# Patient Record
Sex: MALE | Race: WHITE | NOT HISPANIC OR LATINO | Employment: OTHER | URBAN - METROPOLITAN AREA
[De-identification: names, ages, dates, MRNs, and addresses within clinical notes are randomized per-mention and may not be internally consistent; named-entity substitution may affect disease eponyms.]

---

## 2019-11-16 ENCOUNTER — HOSPITAL ENCOUNTER (EMERGENCY)
Facility: HOSPITAL | Age: 59
Discharge: HOME | End: 2019-11-16
Attending: EMERGENCY MEDICINE

## 2019-11-16 VITALS
DIASTOLIC BLOOD PRESSURE: 96 MMHG | TEMPERATURE: 98.2 F | BODY MASS INDEX: 36.29 KG/M2 | HEIGHT: 69 IN | WEIGHT: 245 LBS | OXYGEN SATURATION: 99 % | HEART RATE: 81 BPM | SYSTOLIC BLOOD PRESSURE: 161 MMHG | RESPIRATION RATE: 19 BRPM

## 2019-11-16 DIAGNOSIS — J22 BACTERIAL LOWER RESPIRATORY INFECTION: Primary | ICD-10-CM

## 2019-11-16 DIAGNOSIS — B96.89 BACTERIAL LOWER RESPIRATORY INFECTION: Primary | ICD-10-CM

## 2019-11-16 PROCEDURE — 99283 EMERGENCY DEPT VISIT LOW MDM: CPT

## 2019-11-16 PROCEDURE — 99283 EMERGENCY DEPT VISIT LOW MDM: CPT | Performed by: PHYSICIAN ASSISTANT

## 2019-11-16 RX ORDER — AMOXICILLIN AND CLAVULANATE POTASSIUM 875; 125 MG/1; MG/1
1 TABLET, FILM COATED ORAL EVERY 12 HOURS
Qty: 14 TABLET | Refills: 0 | Status: SHIPPED | OUTPATIENT
Start: 2019-11-16 | End: 2019-11-23

## 2019-11-16 NOTE — ED PROVIDER NOTES
History  Chief Complaint   Patient presents with    Cough     Pt c/o cough for the past 2 wks  Romel Grove is a 60 y o  male w PMH none significant who presents for evaluation of cough  Pt w cough for 2 weeks  Multiple sick family members  No f/c  + productive phlegm, no hemoptysis, cp or tightness  He took a few amoxicillin his dog had been prescribed and felt better but only had a few pills and now feels worse now that he has stopped them  None       History reviewed  No pertinent past medical history  History reviewed  No pertinent surgical history  History reviewed  No pertinent family history  I have reviewed and agree with the history as documented  Social History     Tobacco Use    Smoking status: Never Smoker    Smokeless tobacco: Never Used   Substance Use Topics    Alcohol use: Never     Frequency: Never    Drug use: Never        Review of Systems   Constitutional: Negative for activity change, chills, diaphoresis, fatigue and fever  HENT: Negative for congestion and rhinorrhea  Eyes: Negative for pain  Respiratory: Positive for cough  Negative for chest tightness, shortness of breath and wheezing  Cardiovascular: Negative for chest pain and palpitations  Gastrointestinal: Negative for abdominal distention, constipation, diarrhea, nausea and vomiting  Genitourinary: Negative for difficulty urinating and dysuria  Musculoskeletal: Negative for arthralgias and myalgias  Neurological: Negative for dizziness, weakness, light-headedness and headaches  Psychiatric/Behavioral: The patient is not nervous/anxious  Physical Exam  Physical Exam   Constitutional: He is oriented to person, place, and time  He appears well-developed and well-nourished  No distress  HENT:   Head: Normocephalic and atraumatic  Eyes: Pupils are equal, round, and reactive to light  Neck: Neck supple  No tracheal deviation present     Cardiovascular: Normal rate, regular rhythm and intact distal pulses  Exam reveals no gallop and no friction rub  No murmur heard  Pulmonary/Chest: Effort normal and breath sounds normal  No respiratory distress  He has no wheezes  He has no rales  Abdominal: Soft  Bowel sounds are normal  He exhibits no distension and no mass  There is no tenderness  There is no guarding  Musculoskeletal: He exhibits no edema or deformity  Neurological: He is alert and oriented to person, place, and time  Skin: Skin is warm and dry  He is not diaphoretic  Psychiatric: He has a normal mood and affect  His behavior is normal    Nursing note and vitals reviewed  Vital Signs  ED Triage Vitals   Temperature Pulse Respirations Blood Pressure SpO2   11/16/19 1004 11/16/19 1004 11/16/19 1004 11/16/19 1004 11/16/19 1004   98 2 °F (36 8 °C) 81 19 161/96 99 %      Temp Source Heart Rate Source Patient Position - Orthostatic VS BP Location FiO2 (%)   11/16/19 1004 11/16/19 1004 11/16/19 1004 11/16/19 1004 --   Oral Monitor Sitting Right arm       Pain Score       11/16/19 1002       No Pain           Vitals:    11/16/19 1004   BP: 161/96   Pulse: 81   Patient Position - Orthostatic VS: Sitting         Visual Acuity      ED Medications  Medications - No data to display    Diagnostic Studies  Results Reviewed     None                 No orders to display              Procedures  Procedures       ED Course                               MDM  Number of Diagnoses or Management Options  Bacterial lower respiratory infection:   Diagnosis management comments: DDX includes but not ltd to:   Concern for bronchitis v pna  Considering he did improve on amoxicillin and worse after it I am concerned for a bacterial infection  Offered CXR however patient is requesting an abx and did feel improve on it  He does not want a CXR and I do not feel it would    Patient will bc dc on augmentin, can follow up w his PCP     Return parameters discussed   Pt requires f/u as an outpt  Pt expresses understanding w above treatment plan  All questions answered prior to d/c  Portions of the record may have been created with voice recognition software   Occasional wrong word or "sound a like" substitutions may have occurred due to the inherent limitations of voice recognition software   Read the chart carefully and recognize, using context, where substitutions have occurred  Disposition  Final diagnoses:   Bacterial lower respiratory infection     Time reflects when diagnosis was documented in both MDM as applicable and the Disposition within this note     Time User Action Codes Description Comment    11/16/2019 10:43 AM Marquis Brooks [J98 8] Bacterial lower respiratory infection       ED Disposition     ED Disposition Condition Date/Time Comment    Discharge Stable Sat Nov 16, 2019 10:43 AM Nanda Lunch discharge to home/self care  Follow-up Information     Follow up With Specialties Details Why Contact Info Additional Information    [facility] Emergency Department Emergency Medicine  If symptoms worsen [address] [place] [id number] [phone] [place] ED, [address] [place] [address], [facility]          Patient's Medications   Discharge Prescriptions    AMOXICILLIN-CLAVULANATE (AUGMENTIN) 875-125 MG PER TABLET    Take 1 tablet by mouth every 12 (twelve) hours for 7 days       Start Date: 11/16/2019End Date: 11/23/2019       Order Dose: 1 tablet       Quantity: 14 tablet    Refills: 0     No discharge procedures on file      ED Provider  Electronically Signed by           Stephy Steele PA-C  11/16/19 3441

## 2021-04-26 ENCOUNTER — APPOINTMENT (EMERGENCY)
Dept: RADIOLOGY | Facility: HOSPITAL | Age: 61
DRG: 137 | End: 2021-04-26
Payer: COMMERCIAL

## 2021-04-26 ENCOUNTER — HOSPITAL ENCOUNTER (INPATIENT)
Facility: HOSPITAL | Age: 61
LOS: 4 days | Discharge: HOME | DRG: 137 | End: 2021-05-01
Attending: EMERGENCY MEDICINE | Admitting: INTERNAL MEDICINE
Payer: COMMERCIAL

## 2021-04-26 DIAGNOSIS — E11.29 TYPE 2 DIABETES MELLITUS WITH OTHER DIABETIC KIDNEY COMPLICATION, WITH LONG-TERM CURRENT USE OF INSULIN (HCC): ICD-10-CM

## 2021-04-26 DIAGNOSIS — U07.1 PNEUMONIA DUE TO COVID-19 VIRUS: ICD-10-CM

## 2021-04-26 DIAGNOSIS — R79.89 ELEVATED SERUM CREATININE: ICD-10-CM

## 2021-04-26 DIAGNOSIS — J12.82 PNEUMONIA DUE TO COVID-19 VIRUS: ICD-10-CM

## 2021-04-26 DIAGNOSIS — R07.9 CHEST PAIN: Primary | ICD-10-CM

## 2021-04-26 DIAGNOSIS — I10 ESSENTIAL HYPERTENSION: ICD-10-CM

## 2021-04-26 DIAGNOSIS — Z79.4 TYPE 2 DIABETES MELLITUS WITH OTHER DIABETIC KIDNEY COMPLICATION, WITH LONG-TERM CURRENT USE OF INSULIN (HCC): ICD-10-CM

## 2021-04-26 DIAGNOSIS — U07.1 COVID-19 VIRUS INFECTION: ICD-10-CM

## 2021-04-26 LAB
ANION GAP SERPL CALCULATED.3IONS-SCNC: 11 MMOL/L (ref 4–13)
ATRIAL RATE: 79 BPM
ATRIAL RATE: 83 BPM
BASOPHILS # BLD AUTO: 0.01 THOUSANDS/ΜL (ref 0–0.1)
BASOPHILS NFR BLD AUTO: 0 % (ref 0–1)
BUN SERPL-MCNC: 19 MG/DL (ref 5–25)
CALCIUM SERPL-MCNC: 7.7 MG/DL (ref 8.3–10.1)
CHLORIDE SERPL-SCNC: 108 MMOL/L (ref 100–108)
CO2 SERPL-SCNC: 21 MMOL/L (ref 21–32)
CREAT SERPL-MCNC: 1.77 MG/DL (ref 0.6–1.3)
EOSINOPHIL # BLD AUTO: 0.01 THOUSAND/ΜL (ref 0–0.61)
EOSINOPHIL NFR BLD AUTO: 0 % (ref 0–6)
ERYTHROCYTE [DISTWIDTH] IN BLOOD BY AUTOMATED COUNT: 12.7 % (ref 11.6–15.1)
GFR SERPL CREATININE-BSD FRML MDRD: 41 ML/MIN/1.73SQ M
GLUCOSE SERPL-MCNC: 129 MG/DL (ref 65–140)
GLUCOSE SERPL-MCNC: 137 MG/DL (ref 65–140)
GLUCOSE SERPL-MCNC: 99 MG/DL (ref 65–140)
HCT VFR BLD AUTO: 41.5 % (ref 36.5–49.3)
HGB BLD-MCNC: 14.2 G/DL (ref 12–17)
IMM GRANULOCYTES # BLD AUTO: 0.02 THOUSAND/UL (ref 0–0.2)
IMM GRANULOCYTES NFR BLD AUTO: 0 % (ref 0–2)
LYMPHOCYTES # BLD AUTO: 0.93 THOUSANDS/ΜL (ref 0.6–4.47)
LYMPHOCYTES NFR BLD AUTO: 15 % (ref 14–44)
MCH RBC QN AUTO: 28.6 PG (ref 26.8–34.3)
MCHC RBC AUTO-ENTMCNC: 34.2 G/DL (ref 31.4–37.4)
MCV RBC AUTO: 84 FL (ref 82–98)
MONOCYTES # BLD AUTO: 0.44 THOUSAND/ΜL (ref 0.17–1.22)
MONOCYTES NFR BLD AUTO: 7 % (ref 4–12)
NEUTROPHILS # BLD AUTO: 4.67 THOUSANDS/ΜL (ref 1.85–7.62)
NEUTS SEG NFR BLD AUTO: 78 % (ref 43–75)
NRBC BLD AUTO-RTO: 0 /100 WBCS
NT-PROBNP SERPL-MCNC: 521 PG/ML
P AXIS: 63 DEGREES
P AXIS: 80 DEGREES
PLATELET # BLD AUTO: 162 THOUSANDS/UL (ref 149–390)
PLATELET # BLD AUTO: 172 THOUSANDS/UL (ref 149–390)
PMV BLD AUTO: 10 FL (ref 8.9–12.7)
PMV BLD AUTO: 9.9 FL (ref 8.9–12.7)
POTASSIUM SERPL-SCNC: 3.8 MMOL/L (ref 3.5–5.3)
PR INTERVAL: 152 MS
PR INTERVAL: 154 MS
QRS AXIS: 49 DEGREES
QRS AXIS: 77 DEGREES
QRSD INTERVAL: 90 MS
QRSD INTERVAL: 96 MS
QT INTERVAL: 372 MS
QT INTERVAL: 380 MS
QTC INTERVAL: 435 MS
QTC INTERVAL: 437 MS
RBC # BLD AUTO: 4.96 MILLION/UL (ref 3.88–5.62)
SARS-COV-2 RNA RESP QL NAA+PROBE: POSITIVE
SODIUM SERPL-SCNC: 140 MMOL/L (ref 136–145)
T WAVE AXIS: -19 DEGREES
T WAVE AXIS: -30 DEGREES
TROPONIN I SERPL-MCNC: 0.04 NG/ML
TROPONIN I SERPL-MCNC: 0.05 NG/ML
TROPONIN I SERPL-MCNC: 0.06 NG/ML
TROPONIN I SERPL-MCNC: 0.07 NG/ML
VENTRICULAR RATE: 79 BPM
VENTRICULAR RATE: 83 BPM
WBC # BLD AUTO: 6.08 THOUSAND/UL (ref 4.31–10.16)

## 2021-04-26 PROCEDURE — 36415 COLL VENOUS BLD VENIPUNCTURE: CPT | Performed by: NURSE PRACTITIONER

## 2021-04-26 PROCEDURE — 85025 COMPLETE CBC W/AUTO DIFF WBC: CPT | Performed by: NURSE PRACTITIONER

## 2021-04-26 PROCEDURE — 84484 ASSAY OF TROPONIN QUANT: CPT | Performed by: INTERNAL MEDICINE

## 2021-04-26 PROCEDURE — 80048 BASIC METABOLIC PNL TOTAL CA: CPT | Performed by: NURSE PRACTITIONER

## 2021-04-26 PROCEDURE — 93005 ELECTROCARDIOGRAM TRACING: CPT

## 2021-04-26 PROCEDURE — 85049 AUTOMATED PLATELET COUNT: CPT | Performed by: INTERNAL MEDICINE

## 2021-04-26 PROCEDURE — 82948 REAGENT STRIP/BLOOD GLUCOSE: CPT

## 2021-04-26 PROCEDURE — 99285 EMERGENCY DEPT VISIT HI MDM: CPT | Performed by: NURSE PRACTITIONER

## 2021-04-26 PROCEDURE — U0005 INFEC AGEN DETEC AMPLI PROBE: HCPCS | Performed by: NURSE PRACTITIONER

## 2021-04-26 PROCEDURE — 99220 PR INITIAL OBSERVATION CARE/DAY 70 MINUTES: CPT | Performed by: INTERNAL MEDICINE

## 2021-04-26 PROCEDURE — 84484 ASSAY OF TROPONIN QUANT: CPT | Performed by: NURSE PRACTITIONER

## 2021-04-26 PROCEDURE — 99285 EMERGENCY DEPT VISIT HI MDM: CPT

## 2021-04-26 PROCEDURE — 83880 ASSAY OF NATRIURETIC PEPTIDE: CPT | Performed by: NURSE PRACTITIONER

## 2021-04-26 PROCEDURE — 93010 ELECTROCARDIOGRAM REPORT: CPT | Performed by: INTERNAL MEDICINE

## 2021-04-26 PROCEDURE — U0003 INFECTIOUS AGENT DETECTION BY NUCLEIC ACID (DNA OR RNA); SEVERE ACUTE RESPIRATORY SYNDROME CORONAVIRUS 2 (SARS-COV-2) (CORONAVIRUS DISEASE [COVID-19]), AMPLIFIED PROBE TECHNIQUE, MAKING USE OF HIGH THROUGHPUT TECHNOLOGIES AS DESCRIBED BY CMS-2020-01-R: HCPCS | Performed by: NURSE PRACTITIONER

## 2021-04-26 PROCEDURE — 71045 X-RAY EXAM CHEST 1 VIEW: CPT

## 2021-04-26 RX ORDER — SODIUM CHLORIDE 9 MG/ML
50 INJECTION, SOLUTION INTRAVENOUS CONTINUOUS
Status: DISCONTINUED | OUTPATIENT
Start: 2021-04-26 | End: 2021-04-29

## 2021-04-26 RX ORDER — HYDRALAZINE HYDROCHLORIDE 20 MG/ML
5 INJECTION INTRAMUSCULAR; INTRAVENOUS EVERY 6 HOURS PRN
Status: DISCONTINUED | OUTPATIENT
Start: 2021-04-26 | End: 2021-05-01 | Stop reason: HOSPADM

## 2021-04-26 RX ORDER — SODIUM CHLORIDE 9 MG/ML
3 INJECTION INTRAVENOUS
Status: DISCONTINUED | OUTPATIENT
Start: 2021-04-26 | End: 2021-05-01 | Stop reason: HOSPADM

## 2021-04-26 RX ORDER — AMLODIPINE BESYLATE 5 MG/1
5 TABLET ORAL DAILY
Status: DISCONTINUED | OUTPATIENT
Start: 2021-04-26 | End: 2021-05-01 | Stop reason: HOSPADM

## 2021-04-26 RX ORDER — LISINOPRIL 20 MG/1
20 TABLET ORAL DAILY
COMMUNITY
End: 2021-05-01 | Stop reason: HOSPADM

## 2021-04-26 RX ORDER — ACETAMINOPHEN 325 MG/1
650 TABLET ORAL EVERY 6 HOURS PRN
Status: DISCONTINUED | OUTPATIENT
Start: 2021-04-26 | End: 2021-05-01 | Stop reason: HOSPADM

## 2021-04-26 RX ORDER — HEPARIN SODIUM 5000 [USP'U]/ML
5000 INJECTION, SOLUTION INTRAVENOUS; SUBCUTANEOUS EVERY 8 HOURS SCHEDULED
Status: DISCONTINUED | OUTPATIENT
Start: 2021-04-26 | End: 2021-05-01 | Stop reason: HOSPADM

## 2021-04-26 RX ORDER — ASPIRIN 81 MG/1
324 TABLET, CHEWABLE ORAL ONCE
Status: COMPLETED | OUTPATIENT
Start: 2021-04-26 | End: 2021-04-26

## 2021-04-26 RX ORDER — NITROGLYCERIN 0.4 MG/1
0.4 TABLET SUBLINGUAL
Status: DISCONTINUED | OUTPATIENT
Start: 2021-04-26 | End: 2021-05-01 | Stop reason: HOSPADM

## 2021-04-26 RX ADMIN — AMLODIPINE BESYLATE 5 MG: 5 TABLET ORAL at 16:40

## 2021-04-26 RX ADMIN — ACETAMINOPHEN 650 MG: 325 TABLET, FILM COATED ORAL at 22:39

## 2021-04-26 RX ADMIN — ASPIRIN 324 MG: 81 TABLET, CHEWABLE ORAL at 13:16

## 2021-04-26 RX ADMIN — SODIUM CHLORIDE 50 ML/HR: 0.9 INJECTION, SOLUTION INTRAVENOUS at 16:39

## 2021-04-26 RX ADMIN — HEPARIN SODIUM 5000 UNITS: 5000 INJECTION INTRAVENOUS; SUBCUTANEOUS at 22:39

## 2021-04-26 RX ADMIN — HEPARIN SODIUM 5000 UNITS: 5000 INJECTION INTRAVENOUS; SUBCUTANEOUS at 16:40

## 2021-04-26 NOTE — ED PROVIDER NOTES
History  Chief Complaint   Patient presents with    Chest Pain     pt reports L sided chest pain x 3 days with severe SOB, pt reports SOB is worse with walking      69-year-old male patient presents here with reports of chest pressure and heaviness  He reports for the last few days he has been having increased chest pressure with exertion  Now he is beginning to have this at rest   He has a history of hypertension diabetes and reports with his family  Chest Pain  Pain location:  Substernal area  Pain quality: tightness    Pain radiates to:  R arm  Pain severity:  Mild  Onset quality:  Gradual  Timing:  Intermittent  Progression:  Waxing and waning  Chronicity:  New  Context: movement    Relieved by:  None tried  Worsened by:  Exertion  Ineffective treatments:  None tried  Associated symptoms: diaphoresis, dizziness and fever    Associated symptoms: no abdominal pain, no back pain, no cough, no fatigue, no headache, no palpitations, no shortness of breath and not vomiting        Prior to Admission Medications   Prescriptions Last Dose Informant Patient Reported? Taking? Insulin Degludec (TRESIBA FLEXTOUCH SC)   Yes Yes   Sig: Inject 120 mg under the skin daily   lisinopril (ZESTRIL) 20 mg tablet   Yes No   Sig: Take 20 mg by mouth daily      Facility-Administered Medications: None       Past Medical History:   Diagnosis Date    Diabetes mellitus ([facility] [address] )     Hypertension        History reviewed  No pertinent surgical history  History reviewed  No pertinent family history  I have reviewed and agree with the history as documented  E-Cigarette/Vaping     E-Cigarette/Vaping Substances     Social History     Tobacco Use    Smoking status: Never Smoker    Smokeless tobacco: Never Used   Substance Use Topics    Alcohol use: Yes     Frequency: Monthly or less     Drinks per session: 1 or 2    Drug use: Never       Review of Systems   Constitutional: Positive for diaphoresis and fever   Negative for fatigue  HENT: Negative for congestion, ear pain, nosebleeds and sore throat  Eyes: Negative for photophobia, pain, discharge and visual disturbance  Respiratory: Negative for cough, choking, chest tightness, shortness of breath and wheezing  Cardiovascular: Positive for chest pain  Negative for palpitations  Gastrointestinal: Negative for abdominal distention, abdominal pain, diarrhea and vomiting  Genitourinary: Negative for dysuria, flank pain and frequency  Musculoskeletal: Negative for back pain, gait problem and joint swelling  Skin: Negative for color change and rash  Neurological: Positive for dizziness  Negative for syncope and headaches  Psychiatric/Behavioral: Negative for behavioral problems and confusion  The patient is not nervous/anxious  All other systems reviewed and are negative  Physical Exam  Physical Exam  Vitals signs and nursing note reviewed  Constitutional:       General: He is not in acute distress  Appearance: He is well-developed  HENT:      Head: Normocephalic and atraumatic  Eyes:      General:         Right eye: No discharge  Left eye: No discharge  Conjunctiva/sclera: Conjunctivae normal    Neck:      Musculoskeletal: Normal range of motion and neck supple  Cardiovascular:      Rate and Rhythm: Normal rate  Pulmonary:      Effort: Pulmonary effort is normal  No respiratory distress  Abdominal:      General: There is no distension  Tenderness: There is no guarding  Musculoskeletal:         General: No deformity  Skin:     General: Skin is warm and dry  Neurological:      Mental Status: He is alert and oriented to person, place, and time        Coordination: Coordination normal          Vital Signs  ED Triage Vitals [04/26/21 1127]   Temperature Pulse Respirations Blood Pressure SpO2   98 °F (36 7 °C) 89 (!) 24 165/82 99 %      Temp Source Heart Rate Source Patient Position - Orthostatic VS BP Location FiO2 (%)   Oral Monitor Sitting Left arm --      Pain Score       --           Vitals:    04/26/21 1127 04/26/21 1215 04/26/21 1245   BP: 165/82 (!) 162/109 (!) 179/92   Pulse: 89 76 74   Patient Position - Orthostatic VS: Sitting           Visual Acuity      ED Medications  Medications   sodium chloride (PF) 0 9 % injection 3 mL (has no administration in time range)   sodium chloride 0 9 % infusion (has no administration in time range)   heparin (porcine) subcutaneous injection 5,000 Units (has no administration in time range)   nitroglycerin (NITROSTAT) SL tablet 0 4 mg (has no administration in time range)   acetaminophen (TYLENOL) tablet 650 mg (has no administration in time range)   amLODIPine (NORVASC) tablet 5 mg (has no administration in time range)   hydrALAZINE (APRESOLINE) injection 5 mg (has no administration in time range)   insulin lispro (HumaLOG) 100 units/mL subcutaneous injection 1-6 Units (has no administration in time range)   insulin lispro (HumaLOG) 100 units/mL subcutaneous injection 1-5 Units (has no administration in time range)   aspirin chewable tablet 324 mg (324 mg Oral Given 4/26/21 1316)       Diagnostic Studies  Results Reviewed     Procedure Component Value Units Date/Time    Platelet count [[id number]]     Lab Status: No result Specimen: Blood     Novel Coronavirus (Covid-19),PCR [facility] [[id number]]  (Abnormal) Collected: 04/26/21 1238    Lab Status: Final result Specimen: Nares from Nose Updated: 04/26/21 1347     SARS-CoV-2 Positive    Narrative: The specimen collection materials, transport medium, and/or testing methodology utilized in the production of these test results have been proven to be reliable in a limited validation with an abbreviated program under the Emergency Utilization Authorization provided by the FDA  Testing reported as "Presumptive positive" will be confirmed with secondary testing to ensure result accuracy    Clinical caution and judgement should be used with the interpretation of these results with consideration of the clinical impression and other laboratory testing  Testing reported as "Positive" or "Negative" has been proven to be accurate according to standard laboratory validation requirements  All testing is performed with control materials showing appropriate reactivity at standard intervals        Basic metabolic panel [[id number]]  (Abnormal) Collected: 04/26/21 1238    Lab Status: Final result Specimen: Blood from Arm, Right Updated: 04/26/21 1317     Sodium 140 mmol/L      Potassium 3 8 mmol/L      Chloride 108 mmol/L      CO2 21 mmol/L      ANION GAP 11 mmol/L      BUN 19 mg/dL      Creatinine 1 77 mg/dL      Glucose 137 mg/dL      Calcium 7 7 mg/dL      eGFR 41 ml/min/1 73sq m     Narrative:      [place] guidelines for Chronic Kidney Disease (CKD):     Stage 1 with normal or high GFR (GFR > 90 mL/min/1 73 square meters)    Stage 2 Mild CKD (GFR = 60-89 mL/min/1 73 square meters)    Stage 3A Moderate CKD (GFR = 45-59 mL/min/1 73 square meters)    Stage 3B Moderate CKD (GFR = 30-44 mL/min/1 73 square meters)    Stage 4 Severe CKD (GFR = 15-29 mL/min/1 73 square meters)    Stage 5 End Stage CKD (GFR <15 mL/min/1 73 square meters)  Note: GFR calculation is accurate only with a steady state creatinine    NT-BNP PRO [[id number]]  (Abnormal) Collected: 04/26/21 1238    Lab Status: Final result Specimen: Blood from Arm, Right Updated: 04/26/21 1317     NT-proBNP 521 pg/mL     Troponin I [[id number]]  (Normal) Collected: 04/26/21 1238    Lab Status: Final result Specimen: Blood from Arm, Right Updated: 04/26/21 1310     Troponin I 0 04 ng/mL     CBC and differential [[id number]]  (Abnormal) Collected: 04/26/21 1238    Lab Status: Final result Specimen: Blood from Arm, Right Updated: 04/26/21 1244     WBC 6 08 Thousand/uL      RBC 4 96 Million/uL      Hemoglobin 14 2 g/dL      Hematocrit 41 5 %      MCV 84 fL      MCH 28 6 pg      MCHC 34 2 g/dL      RDW 12 7 %      MPV 9 9 fL      Platelets 530 Thousands/uL      nRBC 0 /100 WBCs      Neutrophils Relative 78 %      Immat GRANS % 0 %      Lymphocytes Relative 15 %      Monocytes Relative 7 %      Eosinophils Relative 0 %      Basophils Relative 0 %      Neutrophils Absolute 4 67 Thousands/µL      Immature Grans Absolute 0 02 Thousand/uL      Lymphocytes Absolute 0 93 Thousands/µL      Monocytes Absolute 0 44 Thousand/µL      Eosinophils Absolute 0 01 Thousand/µL      Basophils Absolute 0 01 Thousands/µL                  X-ray chest 1 view portable   Final Result by Lj Osman MD (04/26 1312)      Patchy peripheral pulmonary infiltrates which may indicate Covid pneumonia                    Workstation performed: [id number]                    Procedures  ECG 12 Lead Documentation Only    Date/Time: 4/26/2021 1:57 PM  Performed by: ALINA Hoffman  Authorized by: ALINA Hoffman     Indications / Diagnosis:  Cp  ECG reviewed by me, the ED Provider: yes    Patient location:  ED  Previous ECG:     Previous ECG:  Unavailable  Interpretation:     Interpretation: normal    Rate:     ECG rate:  87    ECG rate assessment: normal    Rhythm:     Rhythm: sinus rhythm    Ectopy:     Ectopy: none    QRS:     QRS axis:  Normal  Conduction:     Conduction: normal    ST segments:     ST segments:  Normal  T waves:     T waves: inverted    Q waves:     Q waves:  II, III, aVF and V6             ED Course             HEART Risk Score      Most Recent Value   Heart Score Risk Calculator   History  2 Filed at: 04/26/2021 1333   ECG  1 Filed at: 04/26/2021 1333   Age  1 Filed at: 04/26/2021 1333   Risk Factors  1 Filed at: 04/26/2021 1333   Troponin  0 Filed at: 04/26/2021 1333   HEART Score  5 Filed at: 04/26/2021 1333                        EDY Risk Score      Most Recent Value   Age >= 65  0 Filed at: 04/26/2021 1356   Known CAD (stenosis >= 50%)  0 Filed at: 04/26/2021 1356   Recent (<=24 hrs) Service Angina  1 Filed at: 04/26/2021 1356   ST Deviation >= 0 5 mm  1 Filed at: 04/26/2021 1356   3+ CAD Risk Factors (FHx, HTN, HLP, DM, Smoker)  0 Filed at: 04/26/2021 1356   Aspirin Use Past 7 Days  1 Filed at: 04/26/2021 1356   Elevated Cardiac Markers  0 Filed at: 04/26/2021 1356   EDY Risk Score (Calculated)  3 Filed at: 04/26/2021 1356                  MDM  Number of Diagnoses or Management Options  Chest pain: new and requires workup  Pneumonia due to COVID-19 virus: new and requires workup     Amount and/or Complexity of Data Reviewed  Clinical lab tests: reviewed and ordered  Tests in the radiology section of CPT®: reviewed and ordered  Tests in the medicine section of CPT®: reviewed and ordered  Independent visualization of images, tracings, or specimens: yes    Patient Progress  Patient progress: stable      Disposition  Final diagnoses:   Chest pain   Pneumonia due to COVID-19 virus     Time reflects when diagnosis was documented in both MDM as applicable and the Disposition within this note     Time User Action Codes Description Comment    4/26/2021  1:56 PM Isaac Luna Add [R07 9] Chest pain     4/26/2021  1:56 PM Isaac Luna Add [U07 1,  J12 82] Pneumonia due to COVID-19 virus       ED Disposition     ED Disposition Condition Date/Time Comment    Admit Stable Mon Apr 26, 2021  1:56 PM Case was discussed with Frances and the patient's admission status was agreed to be Admission Status: observation status to the service of Dr Vivian Maradiaga          Follow-up Information    None         Patient's Medications   Discharge Prescriptions    No medications on file     No discharge procedures on file      PDMP Review     None          ED Provider  Electronically Signed by           ALINA Dunn  04/26/21 9489

## 2021-04-26 NOTE — ASSESSMENT & PLAN NOTE
Previous creatinine 2 years ago approximately 1 2  Presented creatinine 1 7 unclear if BELKIS versus secondary to uncontrolled diabetes and hypertension  Will provide gentle IVF and reassess tomorrow

## 2021-04-26 NOTE — H&P
[facility]  H&P- Katt Benedict 1960, 63 y o  male MRN: [id number]  Unit/Bed#: ED 09 Encounter: [id number]  Primary Care Provider: No primary care provider on file  Date and time admitted to hospital: 4/26/2021 12:07 PM    * Chest pain  Assessment & Plan  Presented with substernal chest pain  Does have risk factors including hypertension and type 2 diabetes  Initial EKG showed no signs of acute ischemia  Initial troponin negative  Will trend troponins  Check echo  Continuous tele  P r n  Nitro    Elevated serum creatinine  Assessment & Plan  Previous creatinine 2 years ago approximately 1 2  Presented creatinine 1 7 unclear if BELKIS versus secondary to uncontrolled diabetes and hypertension  Will provide gentle IVF and reassess tomorrow    Essential hypertension  Assessment & Plan  Hold lisinopril in setting of BELKIS  Will add amlodipine given elevated blood pressure  Labetalol p r n  Type 2 diabetes mellitus with kidney complication, with long-term current use of insulin (HCC)  Assessment & Plan    Lab Results   Component Value Date    HGBA1C 7 6 (H) 07/13/2019   Sliding scale insulin      VTE Prophylaxis: Heparin  / sequential compression device   Code Status: full code  POLST: There is no POLST form on file for this patient (pre-hospital)  Discussion with family: pt    Anticipated Length of Stay:  Patient will be admitted on an Emergency basis with an anticipated length of stay of  < 2 midnights  Justification for Hospital Stay:  Chest pain    Total Time for Visit, including Counseling / Coordination of Care: 60 minutes  Greater than 50% of this total time spent on direct patient counseling and coordination of care  Chief Complaint:   Chest pain    History of Present Illness:    Katt Benedict is a 63 y o  male with past medical history significant of type 2 diabetes, hypertension initially presented with substernal chest pain  Patient reports worse with exertion    Otherwise no alleviating or aggravating factors  Denies any shortness of breath, fevers, chills, abdominal pain, nausea, vomiting, diarrhea, constipation, dysuria or any other symptoms  Review of Systems:    Review of Systems   Constitutional: Negative for appetite change, chills, diaphoresis, fatigue, fever and unexpected weight change  HENT: Negative for congestion, rhinorrhea and sore throat  Eyes: Negative for photophobia and visual disturbance  Respiratory: Negative for cough, shortness of breath and wheezing  Cardiovascular: Positive for chest pain  Negative for palpitations and leg swelling  Gastrointestinal: Negative for abdominal pain, anal bleeding, blood in stool, constipation, diarrhea, nausea and vomiting  Genitourinary: Negative for decreased urine volume, difficulty urinating, dysuria, flank pain, frequency, hematuria and urgency  Musculoskeletal: Negative for arthralgias, back pain, joint swelling and myalgias  Skin: Negative for color change and rash  Neurological: Negative for dizziness, seizures, facial asymmetry, speech difficulty, numbness and headaches  Psychiatric/Behavioral: Negative for agitation, confusion and decreased concentration  The patient is not nervous/anxious  Past Medical and Surgical History:     Past Medical History:   Diagnosis Date    Diabetes mellitus ([facility] [address] )     Hypertension        History reviewed  No pertinent surgical history  Meds/Allergies:    Prior to Admission medications    Medication Sig Start Date End Date Taking? Authorizing Provider   Insulin Degludec (TRESIBA FLEXTOUCH SC) Inject 120 mg under the skin daily   Yes Historical Provider, MD   lisinopril (ZESTRIL) 20 mg tablet Take 20 mg by mouth daily    Historical Provider, MD     I have reviewed home medications with patient personally      Allergies: No Known Allergies    Social History:     Marital Status:      Patient Pre-hospital Living Situation: home  Patient Pre-hospital Level of Mobility: independent  Patient Pre-hospital Diet Restrictions: none  Substance Use History:   Social History     Substance and Sexual Activity   Alcohol Use Yes    Frequency: Monthly or less    Drinks per session: 1 or 2     Social History     Tobacco Use   Smoking Status Never Smoker   Smokeless Tobacco Never Used     Social History     Substance and Sexual Activity   Drug Use Never       Family History:    History reviewed  No pertinent family history  Physical Exam:     Vitals:   Blood Pressure: (!) 179/92 (04/26/21 1245)  Pulse: 74 (04/26/21 1245)  Temperature: 98 °F (36 7 °C) (04/26/21 1127)  Temp Source: Oral (04/26/21 1127)  Respirations: 21 (04/26/21 1245)  SpO2: 98 % (04/26/21 1245)    Physical Exam  Constitutional:       General: He is not in acute distress  Appearance: He is well-developed  He is not diaphoretic  HENT:      Head: Normocephalic and atraumatic  Nose: Nose normal       Mouth/Throat:      Pharynx: No oropharyngeal exudate  Eyes:      General: No scleral icterus  Conjunctiva/sclera: Conjunctivae normal    Neck:      Musculoskeletal: Normal range of motion and neck supple  Cardiovascular:      Rate and Rhythm: Normal rate and regular rhythm  Heart sounds: Normal heart sounds  No murmur  No friction rub  No gallop  Pulmonary:      Effort: Pulmonary effort is normal  No respiratory distress  Breath sounds: Normal breath sounds  No wheezing or rales  Chest:      Chest wall: No tenderness  Abdominal:      General: Bowel sounds are normal  There is no distension  Palpations: Abdomen is soft  Tenderness: There is no abdominal tenderness  There is no guarding  Musculoskeletal: Normal range of motion  General: No tenderness or deformity  Skin:     General: Skin is warm and dry  Findings: No erythema  Neurological:      Mental Status: He is alert  Mental status is at baseline  Additional Data:     Lab Results:  I have personally reviewed pertinent reports  Results from last 7 days   Lab Units 04/26/21  1238   WBC Thousand/uL 6 08   HEMOGLOBIN g/dL 14 2   HEMATOCRIT % 41 5   PLATELETS Thousands/uL 172   NEUTROS PCT % 78*   LYMPHS PCT % 15   MONOS PCT % 7   EOS PCT % 0     Results from last 7 days   Lab Units 04/26/21  1238   SODIUM mmol/L 140   POTASSIUM mmol/L 3 8   CHLORIDE mmol/L 108   CO2 mmol/L 21   BUN mg/dL 19   CREATININE mg/dL 1 77*   ANION GAP mmol/L 11   CALCIUM mg/dL 7 7*   GLUCOSE RANDOM mg/dL 137                       Imaging: I have personally reviewed pertinent reports  X-ray chest 1 view portable   Final Result by Iraj Burnett MD (04/26 1312)      Patchy peripheral pulmonary infiltrates which may indicate Covid pneumonia  Workstation performed: [id number]             EKG, Pathology, and Other Studies Reviewed on Admission:   · EKG: reviewed    Allscripts / Epic Records Reviewed: Yes     ** Please Note: This note has been constructed using a voice recognition system   **

## 2021-04-26 NOTE — ASSESSMENT & PLAN NOTE
Hold lisinopril in setting of BEKLIS  Will add amlodipine given elevated blood pressure  Labetalol p r n

## 2021-04-26 NOTE — ASSESSMENT & PLAN NOTE
Presented with substernal chest pain  Does have risk factors including hypertension and type 2 diabetes  Initial EKG showed T-wave inversions in inferior leads  Initial troponin negative  Will trend troponins  Check echo  Continuous tele  P r n   Nitro  Would likely benefit from stress test troponins remain negative

## 2021-04-27 PROBLEM — E66.09 CLASS 2 OBESITY DUE TO EXCESS CALORIES WITHOUT SERIOUS COMORBIDITY WITH BODY MASS INDEX (BMI) OF 36.0 TO 36.9 IN ADULT: Status: ACTIVE | Noted: 2021-04-27

## 2021-04-27 PROBLEM — U07.1 COVID-19 VIRUS INFECTION: Status: ACTIVE | Noted: 2021-04-27

## 2021-04-27 LAB
ANION GAP SERPL CALCULATED.3IONS-SCNC: 12 MMOL/L (ref 4–13)
BUN SERPL-MCNC: 21 MG/DL (ref 5–25)
CALCIUM SERPL-MCNC: 7.8 MG/DL (ref 8.3–10.1)
CHLORIDE SERPL-SCNC: 109 MMOL/L (ref 100–108)
CO2 SERPL-SCNC: 18 MMOL/L (ref 21–32)
CREAT SERPL-MCNC: 1.7 MG/DL (ref 0.6–1.3)
GFR SERPL CREATININE-BSD FRML MDRD: 43 ML/MIN/1.73SQ M
GLUCOSE SERPL-MCNC: 108 MG/DL (ref 65–140)
GLUCOSE SERPL-MCNC: 184 MG/DL (ref 65–140)
GLUCOSE SERPL-MCNC: 212 MG/DL (ref 65–140)
GLUCOSE SERPL-MCNC: 283 MG/DL (ref 65–140)
GLUCOSE SERPL-MCNC: 61 MG/DL (ref 65–140)
POTASSIUM SERPL-SCNC: 3.5 MMOL/L (ref 3.5–5.3)
SODIUM SERPL-SCNC: 139 MMOL/L (ref 136–145)
TROPONIN I SERPL-MCNC: 0.08 NG/ML
TROPONIN I SERPL-MCNC: 0.08 NG/ML
TROPONIN I SERPL-MCNC: 0.09 NG/ML

## 2021-04-27 PROCEDURE — 82948 REAGENT STRIP/BLOOD GLUCOSE: CPT

## 2021-04-27 PROCEDURE — XW033E5 INTRODUCTION OF REMDESIVIR ANTI-INFECTIVE INTO PERIPHERAL VEIN, PERCUTANEOUS APPROACH, NEW TECHNOLOGY GROUP 5: ICD-10-PCS | Performed by: INTERNAL MEDICINE

## 2021-04-27 PROCEDURE — 80048 BASIC METABOLIC PNL TOTAL CA: CPT | Performed by: INTERNAL MEDICINE

## 2021-04-27 PROCEDURE — 84484 ASSAY OF TROPONIN QUANT: CPT | Performed by: INTERNAL MEDICINE

## 2021-04-27 PROCEDURE — 99226 PR SBSQ OBSERVATION CARE/DAY 35 MINUTES: CPT | Performed by: PHYSICIAN ASSISTANT

## 2021-04-27 RX ORDER — ZINC SULFATE 50(220)MG
220 CAPSULE ORAL DAILY
Status: DISCONTINUED | OUTPATIENT
Start: 2021-04-27 | End: 2021-05-01 | Stop reason: HOSPADM

## 2021-04-27 RX ORDER — ASCORBIC ACID 500 MG
1000 TABLET ORAL EVERY 12 HOURS SCHEDULED
Status: DISCONTINUED | OUTPATIENT
Start: 2021-04-27 | End: 2021-05-01 | Stop reason: HOSPADM

## 2021-04-27 RX ORDER — MULTIVITAMIN/IRON/FOLIC ACID 18MG-0.4MG
1 TABLET ORAL DAILY
Status: DISCONTINUED | OUTPATIENT
Start: 2021-05-04 | End: 2021-05-01 | Stop reason: HOSPADM

## 2021-04-27 RX ORDER — MELATONIN
2000 DAILY
Status: DISCONTINUED | OUTPATIENT
Start: 2021-04-27 | End: 2021-05-01 | Stop reason: HOSPADM

## 2021-04-27 RX ADMIN — INSULIN LISPRO 4 UNITS: 100 INJECTION, SOLUTION INTRAVENOUS; SUBCUTANEOUS at 11:07

## 2021-04-27 RX ADMIN — ZINC SULFATE 220 MG (50 MG) CAPSULE 220 MG: CAPSULE at 09:50

## 2021-04-27 RX ADMIN — Medication 2000 UNITS: at 09:49

## 2021-04-27 RX ADMIN — OXYCODONE HYDROCHLORIDE AND ACETAMINOPHEN 1000 MG: 500 TABLET ORAL at 09:49

## 2021-04-27 RX ADMIN — HEPARIN SODIUM 5000 UNITS: 5000 INJECTION INTRAVENOUS; SUBCUTANEOUS at 05:23

## 2021-04-27 RX ADMIN — ACETAMINOPHEN 650 MG: 325 TABLET, FILM COATED ORAL at 21:10

## 2021-04-27 RX ADMIN — OXYCODONE HYDROCHLORIDE AND ACETAMINOPHEN 1000 MG: 500 TABLET ORAL at 21:11

## 2021-04-27 RX ADMIN — HEPARIN SODIUM 5000 UNITS: 5000 INJECTION INTRAVENOUS; SUBCUTANEOUS at 21:11

## 2021-04-27 RX ADMIN — INSULIN LISPRO 2 UNITS: 100 INJECTION, SOLUTION INTRAVENOUS; SUBCUTANEOUS at 21:11

## 2021-04-27 RX ADMIN — AMLODIPINE BESYLATE 5 MG: 5 TABLET ORAL at 09:50

## 2021-04-27 RX ADMIN — HEPARIN SODIUM 5000 UNITS: 5000 INJECTION INTRAVENOUS; SUBCUTANEOUS at 14:56

## 2021-04-27 RX ADMIN — REMDESIVIR 200 MG: 100 INJECTION, POWDER, LYOPHILIZED, FOR SOLUTION INTRAVENOUS at 10:59

## 2021-04-27 RX ADMIN — INSULIN LISPRO 1 UNITS: 100 INJECTION, SOLUTION INTRAVENOUS; SUBCUTANEOUS at 17:45

## 2021-04-27 RX ADMIN — SODIUM CHLORIDE 50 ML/HR: 0.9 INJECTION, SOLUTION INTRAVENOUS at 21:16

## 2021-04-27 NOTE — ASSESSMENT & PLAN NOTE
· Presented with substernal chest pain, does have risk factors including hypertension and type 2 diabetes  · Initial EKG showed T-wave inversions in inferior leads  · Troponin flatly elevated, suspect non-MI elevated secondary to COVID PNA   · Check limited echo  · Continuous tele without acute changes, repeat EKG today   · P r n   Nitro  · Would likely benefit from stress test outpatient once cleared from COVID infection  · Consider chest pain related to COVID infection

## 2021-04-27 NOTE — ASSESSMENT & PLAN NOTE
· Incidental finding on admission, CXR showing ground-glass opacities  · Mild COVID-19 treatment pathway  · COVID-19 vitamins  · Heparin t i d   · Check inflammatory markers, trend  · Hold on Remdesivir as it is unclear when patient contracted virus  · Patient is not hypoxic, no indication for steroids or O2 supplement  · Patient afebrile, hold on antibiotics and check procalcitonin  · Monitor oxygen saturation, supplement needed to maintain > 90%  · Encourage incentive spirometer, ambulation within the room, self prone  Results from last 7 days   Lab Units 04/26/21  1238   WBC Thousand/uL 6 08

## 2021-04-27 NOTE — PLAN OF CARE
Problem: PAIN - ADULT  Goal: Verbalizes/displays adequate comfort level or baseline comfort level  Description: Interventions:  - Encourage patient to monitor pain and request assistance  - Assess pain using appropriate pain scale  - Administer analgesics based on type and severity of pain and evaluate response  - Implement non-pharmacological measures as appropriate and evaluate response  - Consider cultural and social influences on pain and pain management  - Notify physician/advanced practitioner if interventions unsuccessful or patient reports new pain  Outcome: Progressing     Problem: INFECTION - ADULT  Goal: Absence or prevention of progression during hospitalization  Description: INTERVENTIONS:  - Assess and monitor for signs and symptoms of infection  - Monitor lab/diagnostic results  - Monitor all insertion sites, i e  indwelling lines, tubes, and drains  - Monitor endotracheal if appropriate and nasal secretions for changes in amount and color  - [place] appropriate cooling/warming therapies per order  - Administer medications as ordered  - Instruct and encourage patient and family to use good hand hygiene technique  - Identify and instruct in appropriate isolation precautions for identified infection/condition  Outcome: Progressing  Goal: Absence of fever/infection during neutropenic period  Description: INTERVENTIONS:  - Monitor WBC    Outcome: Progressing     Problem: SAFETY ADULT  Goal: Patient will remain free of falls  Description: INTERVENTIONS:  - Assess patient frequently for physical needs  -  Identify cognitive and physical deficits and behaviors that affect risk of falls    -  [place] fall precautions as indicated by assessment   - Educate patient/family on patient safety including physical limitations  - Instruct patient to call for assistance with activity based on assessment  - Modify environment to reduce risk of injury  - Consider OT/PT consult to assist with strengthening/mobility  Outcome: Progressing  Goal: Maintain or return to baseline ADL function  Description: INTERVENTIONS:  -  Assess patient's ability to carry out ADLs; assess patient's baseline for ADL function and identify physical deficits which impact ability to perform ADLs (bathing, care of mouth/teeth, toileting, grooming, dressing, etc )  - Assess/evaluate cause of self-care deficits   - Assess range of motion  - Assess patient's mobility; develop plan if impaired  - Assess patient's need for assistive devices and provide as appropriate  - Encourage maximum independence but intervene and supervise when necessary  - Involve family in performance of ADLs  - Assess for home care needs following discharge   - Consider OT consult to assist with ADL evaluation and planning for discharge  - Provide patient education as appropriate  Outcome: Progressing  Goal: Maintain or return mobility status to optimal level  Description: INTERVENTIONS:  - Assess patient's baseline mobility status (ambulation, transfers, stairs, etc )    - Identify cognitive and physical deficits and behaviors that affect mobility  - Identify mobility aids required to assist with transfers and/or ambulation (gait belt, sit-to-stand, lift, walker, cane, etc )  - [place] fall precautions as indicated by assessment  - Record patient progress and toleration of activity level on Mobility SBAR; progress patient to next Phase/Stage  - Instruct patient to call for assistance with activity based on assessment  - Consider rehabilitation consult to assist with strengthening/weightbearing, etc   Outcome: Progressing     Problem: DISCHARGE PLANNING  Goal: Discharge to home or other facility with appropriate resources  Description: INTERVENTIONS:  - Identify barriers to discharge w/patient and caregiver  - Arrange for needed discharge resources and transportation as appropriate  - Identify discharge learning needs (meds, wound care, etc )  - Arrange for interpretive services to assist at discharge as needed  - Refer to Case Management Department for coordinating discharge planning if the patient needs post-hospital services based on physician/advanced practitioner order or complex needs related to functional status, cognitive ability, or social support system  Outcome: Progressing     Problem: Knowledge Deficit  Goal: Patient/family/caregiver demonstrates understanding of disease process, treatment plan, medications, and discharge instructions  Description: Complete learning assessment and assess knowledge base  Interventions:  - Provide teaching at level of understanding  - Provide teaching via preferred learning methods  Outcome: Progressing     Problem: Potential for Falls  Goal: Patient will remain free of falls  Description: INTERVENTIONS:  - Assess patient frequently for physical needs  -  Identify cognitive and physical deficits and behaviors that affect risk of falls    -  [place] fall precautions as indicated by assessment   - Educate patient/family on patient safety including physical limitations  - Instruct patient to call for assistance with activity based on assessment  - Modify environment to reduce risk of injury  - Consider OT/PT consult to assist with strengthening/mobility  Outcome: Progressing     Problem: CARDIOVASCULAR - ADULT  Goal: Maintains optimal cardiac output and hemodynamic stability  Description: INTERVENTIONS:  - Monitor I/O, vital signs and rhythm  - Monitor for S/S and trends of decreased cardiac output  - Administer and titrate ordered vasoactive medications to optimize hemodynamic stability  - Assess quality of pulses, skin color and temperature  - Assess for signs of decreased coronary artery perfusion  - Instruct patient to report change in severity of symptoms  Outcome: Progressing  Goal: Absence of cardiac dysrhythmias or at baseline rhythm  Description: INTERVENTIONS:  - Continuous cardiac monitoring, vital signs, obtain 12 lead EKG if ordered  - Administer antiarrhythmic and heart rate control medications as ordered  - Monitor electrolytes and administer replacement therapy as ordered  Outcome: Progressing     Problem: RESPIRATORY - ADULT  Goal: Achieves optimal ventilation and oxygenation  Description: INTERVENTIONS:  - Assess for changes in respiratory status  - Assess for changes in mentation and behavior  - Position to facilitate oxygenation and minimize respiratory effort  - Oxygen administered by appropriate delivery if ordered  - Initiate smoking cessation education as indicated  - Encourage broncho-pulmonary hygiene including cough, deep breathe, Incentive Spirometry  - Assess the need for suctioning and aspirate as needed  - Assess and instruct to report SOB or any respiratory difficulty  - Respiratory Therapy support as indicated  Outcome: Progressing     Problem: METABOLIC, FLUID AND ELECTROLYTES - ADULT  Goal: Electrolytes maintained within normal limits  Description: INTERVENTIONS:  - Monitor labs and assess patient for signs and symptoms of electrolyte imbalances  - Administer electrolyte replacement as ordered  - Monitor response to electrolyte replacements, including repeat lab results as appropriate  - Instruct patient on fluid and nutrition as appropriate  Outcome: Progressing  Goal: Fluid balance maintained  Description: INTERVENTIONS:  - Monitor labs   - Monitor I/O and WT  - Instruct patient on fluid and nutrition as appropriate  - Assess for signs & symptoms of volume excess or deficit  Outcome: Progressing  Goal: Glucose maintained within target range  Description: INTERVENTIONS:  - Monitor Blood Glucose as ordered  - Assess for signs and symptoms of hyperglycemia and hypoglycemia  - Administer ordered medications to maintain glucose within target range  - Assess nutritional intake and initiate nutrition service referral as needed  Outcome: Progressing     Problem: SKIN/TISSUE INTEGRITY - ADULT  Goal: Skin integrity remains intact  Description: INTERVENTIONS  - Identify patients at risk for skin breakdown  - Assess and monitor skin integrity  - Assess and monitor nutrition and hydration status  - Monitor labs (i e  albumin)  - Assess for incontinence   - Turn and reposition patient  - Assist with mobility/ambulation  - Relieve pressure over bony prominences  - Avoid friction and shearing  - Provide appropriate hygiene as needed including keeping skin clean and dry  - Evaluate need for skin moisturizer/barrier cream  - Collaborate with interdisciplinary team (i e  Nutrition, Rehabilitation, etc )   - Patient/family teaching  Outcome: Progressing  Goal: Incision(s), wounds(s) or drain site(s) healing without S/S of infection  Description: INTERVENTIONS  - Assess and document risk factors for skin impairment   - Assess and document dressing, incision, wound bed, drain sites and surrounding tissue  - Consider nutrition services referral as needed  - Oral mucous membranes remain intact  - Provide patient/ family education  Outcome: Progressing  Goal: Oral mucous membranes remain intact  Description: INTERVENTIONS  - Assess oral mucosa and hygiene practices  - Implement preventative oral hygiene regimen  - Implement oral medicated treatments as ordered  - Initiate Nutrition services referral as needed  Outcome: Progressing     Problem: HEMATOLOGIC - ADULT  Goal: Maintains hematologic stability  Description: INTERVENTIONS  - Assess for signs and symptoms of bleeding or hemorrhage  - Monitor labs  - Administer supportive blood products/factors as ordered and appropriate  Outcome: Progressing     Problem: MUSCULOSKELETAL - ADULT  Goal: Maintain or return mobility to safest level of function  Description: INTERVENTIONS:  - Assess patient's ability to carry out ADLs; assess patient's baseline for ADL function and identify physical deficits which impact ability to perform ADLs (bathing, care of mouth/teeth, toileting, grooming, dressing, etc )  - Assess/evaluate cause of self-care deficits   - Assess range of motion  - Assess patient's mobility  - Assess patient's need for assistive devices and provide as appropriate  - Encourage maximum independence but intervene and supervise when necessary  - Involve family in performance of ADLs  - Assess for home care needs following discharge   - Consider OT consult to assist with ADL evaluation and planning for discharge  - Provide patient education as appropriate  Outcome: Progressing  Goal: Maintain proper alignment of affected body part  Description: INTERVENTIONS:  - Support, maintain and protect limb and body alignment  - Provide patient/ family with appropriate education  Outcome: Progressing

## 2021-04-27 NOTE — ASSESSMENT & PLAN NOTE
· Hold lisinopril in setting of BELKIS  · Will continue amlodipine as initiated on admission with holding parameters  · Labetalol p r n   /57   Pulse 60   Temp 98 5 °F (36 9 °C)   Resp 18   Ht 5' 9" (1 753 m)   Wt 112 kg (245 lb 13 oz)   SpO2 94%   BMI 36 30 kg/m²

## 2021-04-27 NOTE — PROGRESS NOTES
[address] [facility]  SLIM Progress Note Eloise Cervantes 1960, 63 y o  male MRN: [id number]  Unit/Bed#: MS Kesha-Kaden Encounter: [id number]  Primary Care Provider: No primary care provider on file  Date and time admitted to hospital: 4/26/2021 12:07 PM    * Chest pain  Assessment & Plan  · Presented with substernal chest pain, does have risk factors including hypertension and type 2 diabetes  · Initial EKG showed T-wave inversions in inferior leads  · Troponin flatly elevated, suspect non-MI elevated secondary to COVID PNA   · Check limited echo  · Continuous tele without acute changes, repeat EKG today   · P r n   Nitro  · Would likely benefit from stress test outpatient once cleared from COVID infection  · Consider chest pain related to COVID infection     COVID-19 virus infection  Assessment & Plan  · Incidental finding on admission, CXR showing ground-glass opacities  · Mild COVID-19 treatment pathway  · COVID-19 vitamins  · Heparin t i d   · Check inflammatory markers, trend  · Initiate Remdesivir, today is day 5 of symptoms   · Patient is not hypoxic, no indication for steroids or O2 supplement  · Patient afebrile, hold on antibiotics and check procalcitonin  · Monitor oxygen saturation, supplement needed to maintain > 90%  · Encourage incentive spirometer, ambulation within the room, self prone  Results from last 7 days   Lab Units 04/26/21  1238   WBC Thousand/uL 6 08       Essential hypertension  Assessment & Plan  · Hold lisinopril in setting of BELKIS  · Will continue amlodipine as initiated on admission with holding parameters  · Labetalol p r n   /57   Pulse 60   Temp 98 5 °F (36 9 °C)   Resp 18   Ht 5' 9" (1 753 m)   Wt 112 kg (245 lb 13 oz)   SpO2 94%   BMI 36 30 kg/m²       Elevated serum creatinine  Assessment & Plan  · Previous creatinine 2 years ago around 1 2  · Presented creatinine 1 7 unclear if BELKIS versus secondary to uncontrolled diabetes and hypertension  · Discontinue IV fluid in the COVID-19 pneumonia  · Monitor BMP while hospitalized, daily   Results from last 7 days   Lab Units 04/27/21  0524 04/26/21  1238   BUN mg/dL 21 19   CREATININE mg/dL 1 70* 1 77*   EGFR ml/min/1 73sq m 43 41       Type 2 diabetes mellitus with kidney complication, with long-term current use of insulin ([facility])  Assessment & Plan    Lab Results   Component Value Date    HGBA1C 7 6 (H) 07/13/2019     Results from last 7 days   Lab Units 04/26/21  2240 04/26/21  1638   POC GLUCOSE mg/dl 99 129     · On Tresiba at home, hold  · Continue sliding scale, CCO diet  · Hemoglobin A1c to be updated  · ACHS BG monitoring  · Adjust as needed    Class 2 obesity due to excess calories without serious comorbidity with body mass index (BMI) of 36 0 to 36 9 in adult  Assessment & Plan  · Dietary and lifestyle modifications advised  · Independent risk factor for [id number] complications        VTE Pharmacologic Prophylaxis:   Pharmacologic: Heparin  Mechanical VTE Prophylaxis in Place: No    Patient Centered Rounds: I have evaluated patient without nursing staff present due to COVID-19 precautions    Discussions with Specialists or Other Care Team Provider:     Education and Discussions with Family / Patient:  Patient    Time Spent for Care: 30 minutes  More than 50% of total time spent on counseling and coordination of care as described above  Current Length of Stay: 0 day(s)    Current Patient Status: Observation   Certification Statement: The patient, admitted on an observation basis, will now require > 2 midnight hospital stay due to Limited echocardiogram for today, discharge planning pending results    Discharge Plan:  Unclear, home when medically stable    Code Status: Level 1 - Full Code      Subjective:   Patient seen this morning, reports he feels great after having had breakfast   He wants to know why he has not received really expensive treatment that cured Trump    He also wants to know why was told upo presentation to the hospital that even though his mother is fully vaccinated she still is at risk for bharat COVID  No chest pain today, no shortness of breath, no subjective fever today  I reviewed his current clinical course, recommended initiation of remdesivir given his underlying risk factors and presentation with chest pain and shortness of breath  Patient agreeable  We also discussed indication for a limited echo to look for any regional wall motion abnormalities and to assess EF  Patient advised that starting remdesivir, he should remain in the hospital for the full 5 day treatment course  Alternatively, patient was advised he is still within the 1st 7 days of symptom onset, and could receive monoclonal antibody treatment outpatient at the infusion center  Objective:     Vitals:   Temp (24hrs), Av 4 °F (37 4 °C), Min:98 °F (36 7 °C), Max:101 6 °F (38 7 °C)    Temp:  [98 °F (36 7 °C)-101 6 °F (38 7 °C)] 99 5 °F (37 5 °C)  HR:  [59-89] 65  Resp:  [18-24] 18  BP: (105-193)/() 140/85  SpO2:  [92 %-99 %] 95 %  Body mass index is 36 3 kg/m²  Input and Output Summary (last 24 hours):     No intake or output data in the 24 hours ending 21    Physical Exam:     Physical Exam  Vitals signs and nursing note reviewed  Constitutional:       General: He is not in acute distress  Appearance: He is obese  He is not ill-appearing or toxic-appearing  Cardiovascular:      Rate and Rhythm: Normal rate and regular rhythm  Heart sounds: No murmur  Pulmonary:      Effort: Pulmonary effort is normal  No respiratory distress  Breath sounds: Normal breath sounds  No wheezing  Comments: Minimally decreased bases  Speaking full sentences without distress  Musculoskeletal:      Right lower leg: No edema  Left lower leg: No edema  Neurological:      Mental Status: He is alert and oriented to person, place, and time     Psychiatric: Mood and Affect: Mood normal          Additional Data:     Labs:    Results from last 7 days   Lab Units 04/26/21  1951 04/26/21  1238   WBC Thousand/uL  --  6 08   HEMOGLOBIN g/dL  --  14 2   HEMATOCRIT %  --  41 5   PLATELETS Thousands/uL 162 172   NEUTROS PCT %  --  78*   LYMPHS PCT %  --  15   MONOS PCT %  --  7   EOS PCT %  --  0     Results from last 7 days   Lab Units 04/27/21  0524   SODIUM mmol/L 139   POTASSIUM mmol/L 3 5   CHLORIDE mmol/L 109*   CO2 mmol/L 18*   BUN mg/dL 21   CREATININE mg/dL 1 70*   ANION GAP mmol/L 12   CALCIUM mg/dL 7 8*   GLUCOSE RANDOM mg/dL 108         Results from last 7 days   Lab Units 04/26/21  2240 04/26/21  1638   POC GLUCOSE mg/dl 99 129                   * I Have Reviewed All Lab Data Listed Above  * Additional Pertinent Lab Tests Reviewed:  [facility] Admission Reviewed    Imaging:    Imaging Reports Reviewed Today Include:  CXR  Imaging Personally Reviewed by Myself Includes:  CXR - infiltrates noted, EKG - T-wave inversions, telemetry NSR     Recent Cultures (last 7 days):           Last 24 Hours Medication List:   Current Facility-Administered Medications   Medication Dose Route Frequency Provider Last Rate    acetaminophen  650 mg Oral Q6H PRN Javad Jaimes MD      amLODIPine  5 mg Oral Daily Javad Jaimes MD      ascorbic acid  1,000 mg Oral Q12H [address] Uzma Matthews PA-C      cholecalciferol  2,000 Units Oral Daily Uzma Matthews PA-C      heparin (porcine)  5,000 Units Subcutaneous Q8H [address] Javad Jaimes MD      hydrALAZINE  5 mg Intravenous Q6H PRN Javad Jaimes MD      insulin lispro  1-5 Units Subcutaneous HS Javad Jaimes MD      insulin lispro  1-6 Units Subcutaneous TID AC Javad Jaimes MD      zinc sulfate  220 mg Oral Daily Uzma Mathtews PA-C      Followed by   Alexandre Kaye ON 5/4/2021] multivitamin-minerals  1 tablet Oral Daily Uzma Matthews PA-C      nitroglycerin  0 4 mg Sublingual Q5 Min PRN Diallo Mahajan MD      sodium chloride (PF)  3 mL Intravenous Q1H PRN ALINA Jose      sodium chloride  50 mL/hr Intravenous Continuous Javad Jaimes MD 50 mL/hr (04/26/21 1696)        Today, Patient Was Seen By: Ciarra Mcclure PA-C    ** Please Note: Dictation voice to text software may have been used in the creation of this document   **

## 2021-04-27 NOTE — PLAN OF CARE
Problem: PAIN - ADULT  Goal: Verbalizes/displays adequate comfort level or baseline comfort level  Description: Interventions:  - Encourage patient to monitor pain and request assistance  - Assess pain using appropriate pain scale  - Administer analgesics based on type and severity of pain and evaluate response  - Implement non-pharmacological measures as appropriate and evaluate response  - Consider cultural and social influences on pain and pain management  - Notify physician/advanced practitioner if interventions unsuccessful or patient reports new pain  Outcome: Progressing     Problem: INFECTION - ADULT  Goal: Absence or prevention of progression during hospitalization  Description: INTERVENTIONS:  - Assess and monitor for signs and symptoms of infection  - Monitor lab/diagnostic results  - Monitor all insertion sites, i e  indwelling lines, tubes, and drains  - Monitor endotracheal if appropriate and nasal secretions for changes in amount and color  - [place] appropriate cooling/warming therapies per order  - Administer medications as ordered  - Instruct and encourage patient and family to use good hand hygiene technique  - Identify and instruct in appropriate isolation precautions for identified infection/condition  Outcome: Progressing  Goal: Absence of fever/infection during neutropenic period  Description: INTERVENTIONS:  - Monitor WBC    Outcome: Progressing     Problem: SAFETY ADULT  Goal: Patient will remain free of falls  Description: INTERVENTIONS:  - Assess patient frequently for physical needs  -  Identify cognitive and physical deficits and behaviors that affect risk of falls    -  [place] fall precautions as indicated by assessment   - Educate patient/family on patient safety including physical limitations  - Instruct patient to call for assistance with activity based on assessment  - Modify environment to reduce risk of injury  - Consider OT/PT consult to assist with strengthening/mobility  Outcome: Progressing  Goal: Maintain or return to baseline ADL function  Description: INTERVENTIONS:  -  Assess patient's ability to carry out ADLs; assess patient's baseline for ADL function and identify physical deficits which impact ability to perform ADLs (bathing, care of mouth/teeth, toileting, grooming, dressing, etc )  - Assess/evaluate cause of self-care deficits   - Assess range of motion  - Assess patient's mobility; develop plan if impaired  - Assess patient's need for assistive devices and provide as appropriate  - Encourage maximum independence but intervene and supervise when necessary  - Involve family in performance of ADLs  - Assess for home care needs following discharge   - Consider OT consult to assist with ADL evaluation and planning for discharge  - Provide patient education as appropriate  Outcome: Progressing  Goal: Maintain or return mobility status to optimal level  Description: INTERVENTIONS:  - Assess patient's baseline mobility status (ambulation, transfers, stairs, etc )    - Identify cognitive and physical deficits and behaviors that affect mobility  - Identify mobility aids required to assist with transfers and/or ambulation (gait belt, sit-to-stand, lift, walker, cane, etc )  - [place] fall precautions as indicated by assessment  - Record patient progress and toleration of activity level on Mobility SBAR; progress patient to next Phase/Stage  - Instruct patient to call for assistance with activity based on assessment  - Consider rehabilitation consult to assist with strengthening/weightbearing, etc   Outcome: Progressing     Problem: DISCHARGE PLANNING  Goal: Discharge to home or other facility with appropriate resources  Description: INTERVENTIONS:  - Identify barriers to discharge w/patient and caregiver  - Arrange for needed discharge resources and transportation as appropriate  - Identify discharge learning needs (meds, wound care, etc )  - Arrange for interpretive services to assist at discharge as needed  - Refer to Case Management Department for coordinating discharge planning if the patient needs post-hospital services based on physician/advanced practitioner order or complex needs related to functional status, cognitive ability, or social support system  Outcome: Progressing     Problem: Knowledge Deficit  Goal: Patient/family/caregiver demonstrates understanding of disease process, treatment plan, medications, and discharge instructions  Description: Complete learning assessment and assess knowledge base  Interventions:  - Provide teaching at level of understanding  - Provide teaching via preferred learning methods  Outcome: Progressing     Problem: Potential for Falls  Goal: Patient will remain free of falls  Description: INTERVENTIONS:  - Assess patient frequently for physical needs  -  Identify cognitive and physical deficits and behaviors that affect risk of falls    -  [place] fall precautions as indicated by assessment   - Educate patient/family on patient safety including physical limitations  - Instruct patient to call for assistance with activity based on assessment  - Modify environment to reduce risk of injury  - Consider OT/PT consult to assist with strengthening/mobility  Outcome: Progressing     Problem: CARDIOVASCULAR - ADULT  Goal: Maintains optimal cardiac output and hemodynamic stability  Description: INTERVENTIONS:  - Monitor I/O, vital signs and rhythm  - Monitor for S/S and trends of decreased cardiac output  - Administer and titrate ordered vasoactive medications to optimize hemodynamic stability  - Assess quality of pulses, skin color and temperature  - Assess for signs of decreased coronary artery perfusion  - Instruct patient to report change in severity of symptoms  Outcome: Progressing  Goal: Absence of cardiac dysrhythmias or at baseline rhythm  Description: INTERVENTIONS:  - Continuous cardiac monitoring, vital signs, obtain 12 lead EKG if ordered  - Administer antiarrhythmic and heart rate control medications as ordered  - Monitor electrolytes and administer replacement therapy as ordered  Outcome: Progressing     Problem: RESPIRATORY - ADULT  Goal: Achieves optimal ventilation and oxygenation  Description: INTERVENTIONS:  - Assess for changes in respiratory status  - Assess for changes in mentation and behavior  - Position to facilitate oxygenation and minimize respiratory effort  - Oxygen administered by appropriate delivery if ordered  - Initiate smoking cessation education as indicated  - Encourage broncho-pulmonary hygiene including cough, deep breathe, Incentive Spirometry  - Assess the need for suctioning and aspirate as needed  - Assess and instruct to report SOB or any respiratory difficulty  - Respiratory Therapy support as indicated  Outcome: Progressing     Problem: METABOLIC, FLUID AND ELECTROLYTES - ADULT  Goal: Electrolytes maintained within normal limits  Description: INTERVENTIONS:  - Monitor labs and assess patient for signs and symptoms of electrolyte imbalances  - Administer electrolyte replacement as ordered  - Monitor response to electrolyte replacements, including repeat lab results as appropriate  - Instruct patient on fluid and nutrition as appropriate  Outcome: Progressing  Goal: Fluid balance maintained  Description: INTERVENTIONS:  - Monitor labs   - Monitor I/O and WT  - Instruct patient on fluid and nutrition as appropriate  - Assess for signs & symptoms of volume excess or deficit  Outcome: Progressing  Goal: Glucose maintained within target range  Description: INTERVENTIONS:  - Monitor Blood Glucose as ordered  - Assess for signs and symptoms of hyperglycemia and hypoglycemia  - Administer ordered medications to maintain glucose within target range  - Assess nutritional intake and initiate nutrition service referral as needed  Outcome: Progressing     Problem: SKIN/TISSUE INTEGRITY - ADULT  Goal: Skin integrity remains intact  Description: INTERVENTIONS  - Identify patients at risk for skin breakdown  - Assess and monitor skin integrity  - Assess and monitor nutrition and hydration status  - Monitor labs (i e  albumin)  - Assess for incontinence   - Turn and reposition patient  - Assist with mobility/ambulation  - Relieve pressure over bony prominences  - Avoid friction and shearing  - Provide appropriate hygiene as needed including keeping skin clean and dry  - Evaluate need for skin moisturizer/barrier cream  - Collaborate with interdisciplinary team (i e  Nutrition, Rehabilitation, etc )   - Patient/family teaching  Outcome: Progressing  Goal: Incision(s), wounds(s) or drain site(s) healing without S/S of infection  Description: INTERVENTIONS  - Assess and document risk factors for skin impairment   - Assess and document dressing, incision, wound bed, drain sites and surrounding tissue  - Consider nutrition services referral as needed  - Oral mucous membranes remain intact  - Provide patient/ family education  Outcome: Progressing  Goal: Oral mucous membranes remain intact  Description: INTERVENTIONS  - Assess oral mucosa and hygiene practices  - Implement preventative oral hygiene regimen  - Implement oral medicated treatments as ordered  - Initiate Nutrition services referral as needed  Outcome: Progressing     Problem: HEMATOLOGIC - ADULT  Goal: Maintains hematologic stability  Description: INTERVENTIONS  - Assess for signs and symptoms of bleeding or hemorrhage  - Monitor labs  - Administer supportive blood products/factors as ordered and appropriate  Outcome: Progressing     Problem: MUSCULOSKELETAL - ADULT  Goal: Maintain or return mobility to safest level of function  Description: INTERVENTIONS:  - Assess patient's ability to carry out ADLs; assess patient's baseline for ADL function and identify physical deficits which impact ability to perform ADLs (bathing, care of mouth/teeth, toileting, grooming, dressing, etc )  - Assess/evaluate cause of self-care deficits   - Assess range of motion  - Assess patient's mobility  - Assess patient's need for assistive devices and provide as appropriate  - Encourage maximum independence but intervene and supervise when necessary  - Involve family in performance of ADLs  - Assess for home care needs following discharge   - Consider OT consult to assist with ADL evaluation and planning for discharge  - Provide patient education as appropriate  Outcome: Progressing  Goal: Maintain proper alignment of affected body part  Description: INTERVENTIONS:  - Support, maintain and protect limb and body alignment  - Provide patient/ family with appropriate education  Outcome: Progressing

## 2021-04-27 NOTE — ASSESSMENT & PLAN NOTE
Lab Results   Component Value Date    HGBA1C 7 6 (H) 07/13/2019     Results from last 7 days   Lab Units 04/26/21  2240 04/26/21  1638   POC GLUCOSE mg/dl 99 129     · On Tresiba at home, hold  · Continue sliding scale, CCO diet  · Hemoglobin A1c to be updated  · ACHS BG monitoring  · Adjust as needed

## 2021-04-27 NOTE — UTILIZATION REVIEW
Initial Clinical Review    WAS OBSERVATION 04/26/2021 @ 7762, CONVERTED TO INPATIENT ADMISSION 04/27/2021 @ 1506, DUE TO CONTINUED STAY REQUIRED TO CARE FOR PATIENT WITH    COVID 19,  Started COVID treatment,  Monitor respiratory status  Admission: Date/Time/Statement:   Inpatient Admission Once     Question Answer Comment   Level of Care Med Surg    Estimated length of stay More than 2 Midnights    Certification I certify that inpatient services are medically necessary for this patient for a duration of greater than two midnights  See H&P and MD Progress Notes for additional information about the patient's course of treatment  04/27/21 1506         ED Arrival Information     Expected Arrival Acuity Means of Arrival Escorted By Service Admission Type    - 4/26/2021 11:20 Emergent Walk-In Family Member General Medicine Emergency    Arrival Complaint    SOb/Chest pressure        Chief Complaint   Patient presents with    Chest Pain     pt reports L sided chest pain x 3 days with severe SOB, pt reports SOB is worse with walking        Initial Presentation: 63year old male, presented to the ED @ [facility] from home via walk in  Admitted as Observation due to Chest Pain  past medical history significant of type 2 diabetes, hypertension  Date: 04/26/2021  PTA  Reports chest pain with exertional SOB  Initial ECG - no signs of acute ischemia  Monitor on telemetry  Initial trop wnl, trend trops  Obtain ECHO  NTG PRN  Cr elevated - provide gentle IV hydration  Re-evaluate tomorrow      VTE Prophylaxis: Heparin  / sequential compression device   Day 2: 04/27/2021  Started COVID treatment    ED Triage Vitals   Temperature Pulse Respirations Blood Pressure SpO2   04/26/21 1127 04/26/21 1127 04/26/21 1127 04/26/21 1127 04/26/21 1127   98 °F (36 7 °C) 89 (!) 24 165/82 99 %      Temp Source Heart Rate Source Patient Position - Orthostatic VS BP Location FiO2 (%)   04/26/21 1127 04/26/21 1127 04/26/21 1127 21 1127 --   Oral Monitor Sitting Left arm       Pain Score       21 2100       No Pain          Wt Readings from Last 1 Encounters:   21 112 kg (245 lb 13 oz)     Additional Vital Signs:   Date/Time  Temp  Pulse  Resp  BP  MAP (mmHg)  SpO2  O2 Device  Patient Position - Orthostatic VS   21 08:13:48  99 5 °F (37 5 °C)  65  --  140/85  103  95 %  --  --   21 02:06:18  98 5 °F (36 9 °C)  60  --  105/57  73  94 %  --  --   21 0100  --  59  --  --  --  92 %  None (Room air)  --   21 2300  --  70  --  --  --  94 %  None (Room air)  --   21 21:25:46  101 6 °F (38 7 °C)Abnormal   83  18  176/99Abnormal   125  97 %  None (Room air)  Lying   21 2100  --  --  --  --  --  --  None (Room air)  --   21  --  87  20  155/78  109  98 %  None (Room air)  Lying   21 1300  --  71  20  193/89Abnormal   128  92 %  --  --   21 1245  --  74  21  179/92Abnormal   127  98 %  --  --   21 1215  --  76  20  162/109Abnormal   131  98 %  None (Room air)  --     2021 @ 1311  Chest X:  Patchy peripheral pulmonary infiltrates which may indicate Covid pneumonia       2021 @ 1708  EC, NSR, Nonspecific ST and T wave abnormality    Pertinent Labs/Diagnostic Test Results:   Results from last 7 days   Lab Units 21  1238   SARS-COV-2  Positive*     Results from last 7 days   Lab Units 21  1238   WBC Thousand/uL  --  6 08   HEMOGLOBIN g/dL  --  14 2   HEMATOCRIT %  --  41 5   PLATELETS Thousands/uL 162 172   NEUTROS ABS Thousands/µL  --  4 67         Results from last 7 days   Lab Units 21  0524 21  1238   SODIUM mmol/L 139 140   POTASSIUM mmol/L 3 5 3 8   CHLORIDE mmol/L 109* 108   CO2 mmol/L 18* 21   ANION GAP mmol/L 12 11   BUN mg/dL 21 19   CREATININE mg/dL 1 70* 1 77*   EGFR ml/min/1 73sq m 43 41   CALCIUM mg/dL 7 8* 7 7*         Results from last 7 days   Lab Units 21  0810 21  2240 21  7900 POC GLUCOSE mg/dl 61* 99 129     Results from last 7 days   Lab Units 04/27/21  0524 04/26/21  1238   GLUCOSE RANDOM mg/dL 108 137     Results from last 7 days   Lab Units 04/27/21  0524 04/27/21  0219 04/26/21  2309 04/26/21  1951 04/26/21  1708 04/26/21  1238   TROPONIN I ng/mL 0 09* 0 08* 0 07* 0 06* 0 05* 0 04     Results from last 7 days   Lab Units 04/26/21  1238   NT-PRO BNP pg/mL 521*     ED Treatment:   Medication Administration from 04/26/2021 1120 to 04/26/2021 2112       Date/Time Order Dose Route Action     04/26/2021 1316 aspirin chewable tablet 324 mg 324 mg Oral Given     04/26/2021 1639 sodium chloride 0 9 % infusion 50 mL/hr Intravenous New Bag     04/26/2021 1640 heparin (porcine) subcutaneous injection 5,000 Units 5,000 Units Subcutaneous Given     04/26/2021 1640 amLODIPine (NORVASC) tablet 5 mg 5 mg Oral Given        Past Medical History:   Diagnosis Date    Diabetes mellitus ([facility] [address] )     Hypertension      Present on Admission:   Chest pain   Essential hypertension   Elevated serum creatinine   COVID-19 virus infection      Admitting Diagnosis: Chest pain [R07 9]  SOB (shortness of breath) [R06 02]  Pneumonia due to COVID-19 virus [U07 1, J12 82]  Age/Sex: 63 y o  male  Admission Orders:  Scheduled Medications:  amLODIPine, 5 mg, Oral, Daily  ascorbic acid, 1,000 mg, Oral, Q12H [address]  cholecalciferol, 2,000 Units, Oral, Daily  heparin (porcine), 5,000 Units, Subcutaneous, Q8H ARNOLDO  insulin lispro, 1-5 Units, Subcutaneous, HS  insulin lispro, 1-6 Units, Subcutaneous, TID AC  zinc sulfate, 220 mg, Oral, Daily    Followed by  Joseline Mai ON 5/4/2021] multivitamin-minerals, 1 tablet, Oral, Daily  remdesivir (Veklury) 200 mg in sodium chloride 0 9 % 250 mL IVPB   Dose: 200 mg  Freq: Every 24 hours Route: IV  Indications of Use: INFECTION CAUSED BY 2019 NOVEL CORONAVIRUS  Start: 04/27/21 0930 End: 04/27/21 1129    Admin Instructions:   After the infusion is complete, flush the IV line with at least 30 mL of 0 9% saline at the same infusion rate to ensure that all the remdesivir solution has been administered  Order specific questions:   O2 requirement? None  Symptomatic? Yes  High-risk condition? Type II DM  High-risk condition? Obesity  High-risk condition? Kidney Disease      Followed by  remdesivir [facility]) 100 mg in sodium chloride 0 9 % 250 mL IVPB   Dose: 100 mg  Freq: Every 24 hours Route: IV  Indications of Use: INFECTION CAUSED BY 2019 NOVEL CORONAVIRUS  Start: 04/28/21 0930 End: 05/02/21 8915    Admin Instructions:   After the infusion is complete, flush the IV line with at least 30 mL of 0 9% saline at the same infusion rate to ensure that all the remdesivir solution has been administered  Order specific questions:   O2 requirement? None  Symptomatic? Yes  High-risk condition? Type II DM  High-risk condition? Obesity  High-risk condition? Kidney Disease            Continuous IV Infusions:  sodium chloride, 50 mL/hr, Intravenous, Continuous      PRN Meds:  acetaminophen, 650 mg, Oral, Q6H PRN  hydrALAZINE, 5 mg, Intravenous, Q6H PRN  nitroglycerin, 0 4 mg, Sublingual, Q5 Min PRN  sodium chloride (PF), 3 mL, Intravenous, Q1H PRN      Stress Diet  Telemetry  [facility]      Network Utilization Review Department  ATTENTION: Please call with any questions or concerns to [phone] and carefully listen to the prompts so that you are directed to the right person  All voicemails are confidential   Prabha Perez all requests for admission clinical reviews, approved or denied determinations and any other requests to dedicated fax number below belonging to the campus where the patient is receiving treatment   List of dedicated fax numbers for the Facilities:  [address] [facility] [address] DENIALS (Administrative/Medical Necessity) [phone]   [address] [facility] [address] (Maternity/NICU/Pediatrics) [phone]   [address] [address] [facility] [address] [id number] [place] [address] [address] [place] [address] [phone]

## 2021-04-28 LAB
ALBUMIN SERPL BCP-MCNC: 2.3 G/DL (ref 3.5–5)
ALP SERPL-CCNC: 65 U/L (ref 46–116)
ALT SERPL W P-5'-P-CCNC: 23 U/L (ref 12–78)
ANION GAP SERPL CALCULATED.3IONS-SCNC: 13 MMOL/L (ref 4–13)
AST SERPL W P-5'-P-CCNC: 29 U/L (ref 5–45)
BASOPHILS # BLD AUTO: 0.01 THOUSANDS/ΜL (ref 0–0.1)
BASOPHILS NFR BLD AUTO: 0 % (ref 0–1)
BILIRUB SERPL-MCNC: 0.63 MG/DL (ref 0.2–1)
BUN SERPL-MCNC: 20 MG/DL (ref 5–25)
CALCIUM ALBUM COR SERPL-MCNC: 8.8 MG/DL (ref 8.3–10.1)
CALCIUM SERPL-MCNC: 7.4 MG/DL (ref 8.3–10.1)
CHLORIDE SERPL-SCNC: 103 MMOL/L (ref 100–108)
CO2 SERPL-SCNC: 21 MMOL/L (ref 21–32)
CREAT SERPL-MCNC: 1.64 MG/DL (ref 0.6–1.3)
CRP SERPL QL: 88.2 MG/L
D DIMER PPP FEU-MCNC: 0.9 UG/ML FEU
EOSINOPHIL # BLD AUTO: 0.01 THOUSAND/ΜL (ref 0–0.61)
EOSINOPHIL NFR BLD AUTO: 0 % (ref 0–6)
ERYTHROCYTE [DISTWIDTH] IN BLOOD BY AUTOMATED COUNT: 12.4 % (ref 11.6–15.1)
EST. AVERAGE GLUCOSE BLD GHB EST-MCNC: 255 MG/DL
FERRITIN SERPL-MCNC: 550 NG/ML (ref 8–388)
GFR SERPL CREATININE-BSD FRML MDRD: 44 ML/MIN/1.73SQ M
GLUCOSE SERPL-MCNC: 161 MG/DL (ref 65–140)
GLUCOSE SERPL-MCNC: 214 MG/DL (ref 65–140)
GLUCOSE SERPL-MCNC: 240 MG/DL (ref 65–140)
GLUCOSE SERPL-MCNC: 318 MG/DL (ref 65–140)
GLUCOSE SERPL-MCNC: 323 MG/DL (ref 65–140)
HBA1C MFR BLD: 10.5 %
HCT VFR BLD AUTO: 40.7 % (ref 36.5–49.3)
HGB BLD-MCNC: 14.1 G/DL (ref 12–17)
IMM GRANULOCYTES # BLD AUTO: 0.02 THOUSAND/UL (ref 0–0.2)
IMM GRANULOCYTES NFR BLD AUTO: 0 % (ref 0–2)
LYMPHOCYTES # BLD AUTO: 0.94 THOUSANDS/ΜL (ref 0.6–4.47)
LYMPHOCYTES NFR BLD AUTO: 18 % (ref 14–44)
MCH RBC QN AUTO: 28.6 PG (ref 26.8–34.3)
MCHC RBC AUTO-ENTMCNC: 34.6 G/DL (ref 31.4–37.4)
MCV RBC AUTO: 83 FL (ref 82–98)
MONOCYTES # BLD AUTO: 0.38 THOUSAND/ΜL (ref 0.17–1.22)
MONOCYTES NFR BLD AUTO: 7 % (ref 4–12)
NEUTROPHILS # BLD AUTO: 3.75 THOUSANDS/ΜL (ref 1.85–7.62)
NEUTS SEG NFR BLD AUTO: 75 % (ref 43–75)
NRBC BLD AUTO-RTO: 0 /100 WBCS
PLATELET # BLD AUTO: 174 THOUSANDS/UL (ref 149–390)
PMV BLD AUTO: 10.3 FL (ref 8.9–12.7)
POTASSIUM SERPL-SCNC: 4.1 MMOL/L (ref 3.5–5.3)
PROT SERPL-MCNC: 6.3 G/DL (ref 6.4–8.2)
RBC # BLD AUTO: 4.93 MILLION/UL (ref 3.88–5.62)
SODIUM SERPL-SCNC: 137 MMOL/L (ref 136–145)
WBC # BLD AUTO: 5.11 THOUSAND/UL (ref 4.31–10.16)

## 2021-04-28 PROCEDURE — 82728 ASSAY OF FERRITIN: CPT | Performed by: PHYSICIAN ASSISTANT

## 2021-04-28 PROCEDURE — 85379 FIBRIN DEGRADATION QUANT: CPT | Performed by: PHYSICIAN ASSISTANT

## 2021-04-28 PROCEDURE — 83036 HEMOGLOBIN GLYCOSYLATED A1C: CPT | Performed by: PHYSICIAN ASSISTANT

## 2021-04-28 PROCEDURE — 99232 SBSQ HOSP IP/OBS MODERATE 35: CPT | Performed by: PHYSICIAN ASSISTANT

## 2021-04-28 PROCEDURE — 80053 COMPREHEN METABOLIC PANEL: CPT | Performed by: PHYSICIAN ASSISTANT

## 2021-04-28 PROCEDURE — 85025 COMPLETE CBC W/AUTO DIFF WBC: CPT | Performed by: PHYSICIAN ASSISTANT

## 2021-04-28 PROCEDURE — 82948 REAGENT STRIP/BLOOD GLUCOSE: CPT

## 2021-04-28 PROCEDURE — 86140 C-REACTIVE PROTEIN: CPT | Performed by: PHYSICIAN ASSISTANT

## 2021-04-28 RX ORDER — LANOLIN ALCOHOL/MO/W.PET/CERES
3 CREAM (GRAM) TOPICAL
Status: DISCONTINUED | OUTPATIENT
Start: 2021-04-28 | End: 2021-05-01 | Stop reason: HOSPADM

## 2021-04-28 RX ADMIN — ZINC SULFATE 220 MG (50 MG) CAPSULE 220 MG: CAPSULE at 09:05

## 2021-04-28 RX ADMIN — OXYCODONE HYDROCHLORIDE AND ACETAMINOPHEN 1000 MG: 500 TABLET ORAL at 22:09

## 2021-04-28 RX ADMIN — MELATONIN TAB 3 MG 3 MG: 3 TAB at 22:09

## 2021-04-28 RX ADMIN — INSULIN LISPRO 2 UNITS: 100 INJECTION, SOLUTION INTRAVENOUS; SUBCUTANEOUS at 22:09

## 2021-04-28 RX ADMIN — Medication 2000 UNITS: at 09:05

## 2021-04-28 RX ADMIN — HEPARIN SODIUM 5000 UNITS: 5000 INJECTION INTRAVENOUS; SUBCUTANEOUS at 22:09

## 2021-04-28 RX ADMIN — INSULIN LISPRO 1 UNITS: 100 INJECTION, SOLUTION INTRAVENOUS; SUBCUTANEOUS at 09:06

## 2021-04-28 RX ADMIN — SODIUM CHLORIDE 50 ML/HR: 0.9 INJECTION, SOLUTION INTRAVENOUS at 22:35

## 2021-04-28 RX ADMIN — INSULIN LISPRO 2 UNITS: 100 INJECTION, SOLUTION INTRAVENOUS; SUBCUTANEOUS at 16:55

## 2021-04-28 RX ADMIN — REMDESIVIR 100 MG: 5 INJECTION INTRAVENOUS at 10:58

## 2021-04-28 RX ADMIN — AMLODIPINE BESYLATE 5 MG: 5 TABLET ORAL at 09:05

## 2021-04-28 RX ADMIN — HEPARIN SODIUM 5000 UNITS: 5000 INJECTION INTRAVENOUS; SUBCUTANEOUS at 05:21

## 2021-04-28 RX ADMIN — INSULIN LISPRO 5 UNITS: 100 INJECTION, SOLUTION INTRAVENOUS; SUBCUTANEOUS at 12:54

## 2021-04-28 RX ADMIN — OXYCODONE HYDROCHLORIDE AND ACETAMINOPHEN 1000 MG: 500 TABLET ORAL at 09:05

## 2021-04-28 RX ADMIN — HEPARIN SODIUM 5000 UNITS: 5000 INJECTION INTRAVENOUS; SUBCUTANEOUS at 14:54

## 2021-04-28 NOTE — PROGRESS NOTES
[facility]  SL Progress Note Susan Stephens 1960, 63 y o  male MRN: [id number]  Unit/Bed#: MS [facility] Encounter: [id number]  Primary Care Provider: No primary care provider on file  Date and time admitted to hospital: 4/26/2021 12:07 PM    * Chest pain  Assessment & Plan  · Presented with substernal chest pain, does have risk factors including hypertension and type 2 diabetes  · Initial EKG showed T-wave inversions in inferior leads  · Troponin flatly elevated, suspect non-MI elevated secondary to COVID PNA   · Check limited echo  · Telemetry unremarkable, discontinue  · P r n   Nitro  · Would likely benefit from stress test outpatient once cleared from COVID infection  · Suspect chest pain related to COVID infection     COVID-19 virus infection  Assessment & Plan  · Incidental finding on admission, CXR showing ground-glass opacities  · Mild COVID-19 treatment pathway  · COVID-19 vitamins  · Heparin t i d   · Check inflammatory markers, trend  · Discussed from does severe with patient on 04/27 and initiated, today is day 2/5  · Patient is not hypoxic, no indication for steroids or O2 supplement  · Patient afebrile, hold on antibiotics and check procalcitonin  · Monitor oxygen saturation, supplement needed to maintain > 90%  · Encourage incentive spirometer, ambulation within the room, self prone  Results from last 7 days   Lab Units 04/26/21  1238   WBC Thousand/uL 6 08       Essential hypertension  Assessment & Plan  · Hold lisinopril in setting of BELKIS  · Will continue amlodipine as initiated on admission with holding parameters  · Labetalol p r n   /93   Pulse 69   Temp 98 6 °F (37 °C)   Resp 16   Ht 5' 9" (1 753 m)   Wt 112 kg (245 lb 13 oz)   SpO2 95%   BMI 36 30 kg/m²       Elevated serum creatinine  Assessment & Plan  · Previous creatinine 2 years ago around 1 2  · Presented creatinine 1 7 unclear if BELKIS versus secondary to uncontrolled diabetes and hypertension  · Discontinue IV fluid in the COVID-19 pneumonia  · Monitor BMP while hospitalized, daily   Results from last 7 days   Lab Units 04/27/21  0524 04/26/21  1238   BUN mg/dL 21 19   CREATININE mg/dL 1 70* 1 77*   EGFR ml/min/1 73sq m 43 41       Type 2 diabetes mellitus with kidney complication, with long-term current use of insulin [facility])  Assessment & Plan    Lab Results   Component Value Date    HGBA1C 7 6 (H) 07/13/2019     Results from last 7 days   Lab Units 04/28/21  0814 04/27/21  2104 04/27/21  1527 04/27/21  1106 04/27/21  0810 04/26/21  2240 04/26/21  1638   POC GLUCOSE mg/dl 161* 212* 184* 283* 61* 99 129     · On Tresiba at home, hold  · Continue sliding scale, CCO diet  · Hemoglobin A1c to be updated  · ACHS BG monitoring  · Adjust as needed    Class 2 obesity due to excess calories without serious comorbidity with body mass index (BMI) of 36 0 to 36 9 in adult  Assessment & Plan  · Dietary and lifestyle modifications advised  · Independent risk factor for OACER-85 complications        VTE Pharmacologic Prophylaxis:   Pharmacologic: Heparin  Mechanical VTE Prophylaxis in Place: No    Patient Centered Rounds: I have evaluated patient without nursing staff present due to COVID precautions    Discussions with Specialists or Other Care Team Provider:  Case management    Education and Discussions with Family / Patient:  Patient    Time Spent for Care: 20 minutes  More than 50% of total time spent on counseling and coordination of care as described above  Current Length of Stay: 1 day(s)    Current Patient Status: Inpatient   Certification Statement: The patient will continue to require additional inpatient hospital stay due to IV remdesivir, await echocardiogram and updated labs today    Discharge Plan:  Pending completion of remdesivir course, likely Saturday    Code Status: Level 1 - Full Code      Subjective:   Patient seen this morning, sleeping but easily woken    Denies any worsening symptoms  Still has a dry, irritating cough which is worse with deep inhalation  Denies any chest pain overnight  Reports chills have resolved  No nausea or vomiting  We again reviewed remdesivir indications and monitoring, expected course of illness, etc   Also advised that any close contacts quarantine  Objective:     Vitals:   Temp (24hrs), Av 2 °F (37 3 °C), Min:98 3 °F (36 8 °C), Max:100 6 °F (38 1 °C)    Temp:  [98 3 °F (36 8 °C)-100 6 °F (38 1 °C)] 98 6 °F (37 °C)  HR:  [69-87] 69  Resp:  [16-19] 16  BP: (118-165)/(59-98) 124/93  SpO2:  [91 %-96 %] 95 %  Body mass index is 36 3 kg/m²  Input and Output Summary (last 24 hours): Intake/Output Summary (Last 24 hours) at 2021 0834  Last data filed at 2021 2357  Gross per 24 hour   Intake 1000 ml   Output 1780 ml   Net -780 ml       Physical Exam:     Physical Exam  Vitals signs and nursing note reviewed  Constitutional:       General: He is not in acute distress  Appearance: He is obese  He is not ill-appearing or toxic-appearing  Cardiovascular:      Rate and Rhythm: Normal rate and regular rhythm  Heart sounds: No murmur  Pulmonary:      Effort: Pulmonary effort is normal       Breath sounds: Examination of the right-lower field reveals decreased breath sounds  Examination of the left-lower field reveals decreased breath sounds  Decreased breath sounds present  Abdominal:      General: Bowel sounds are normal  There is no distension  Palpations: Abdomen is soft  Tenderness: There is no abdominal tenderness  Musculoskeletal:         General: No tenderness (calf b/l)  Right lower leg: No edema  Left lower leg: No edema  Skin:     General: Skin is warm and dry  Neurological:      Mental Status: He is alert and oriented to person, place, and time     Psychiatric:         Mood and Affect: Mood normal          Additional Data:     Labs:    Results from last 7 days   Lab Units 21 04/26/21  1238   WBC Thousand/uL  --  6 08   HEMOGLOBIN g/dL  --  14 2   HEMATOCRIT %  --  41 5   PLATELETS Thousands/uL 162 172   NEUTROS PCT %  --  78*   LYMPHS PCT %  --  15   MONOS PCT %  --  7   EOS PCT %  --  0     Results from last 7 days   Lab Units 04/27/21  0524   SODIUM mmol/L 139   POTASSIUM mmol/L 3 5   CHLORIDE mmol/L 109*   CO2 mmol/L 18*   BUN mg/dL 21   CREATININE mg/dL 1 70*   ANION GAP mmol/L 12   CALCIUM mg/dL 7 8*   GLUCOSE RANDOM mg/dL 108         Results from last 7 days   Lab Units 04/28/21  0814 04/27/21  2104 04/27/21  1527 04/27/21  1106 04/27/21  0810 04/26/21  2240 04/26/21  1638   POC GLUCOSE mg/dl 161* 212* 184* 283* 61* 99 129                   * I Have Reviewed All Lab Data Listed Above    * Additional Pertinent Lab Tests Reviewed: Labs Pending At The Time Of This Note    Imaging:    Imaging Reports Reviewed Today Include: none new   Imaging Personally Reviewed by Myself Includes:  Tele - NSR     Recent Cultures (last 7 days):           Last 24 Hours Medication List:   Current Facility-Administered Medications   Medication Dose Route Frequency Provider Last Rate    acetaminophen  650 mg Oral Q6H PRN Javad Jaimes MD      amLODIPine  5 mg Oral Daily Javad Jaimes MD      ascorbic acid  1,000 mg Oral Q12H [address] Uzma Matthews PA-C      cholecalciferol  2,000 Units Oral Daily Uzma Matthews PA-C      heparin (porcine)  5,000 Units Subcutaneous Q8H [address] Javad Jaimes MD      hydrALAZINE  5 mg Intravenous Q6H PRN Javad Jaimes MD      insulin lispro  1-5 Units Subcutaneous HS Javad Jaimes MD      insulin lispro  1-6 Units Subcutaneous TID AC Javad Jaimes MD      zinc sulfate  220 mg Oral Daily Uzma Matthews PA-C      Followed by   Maricela Harada ON 5/4/2021] multivitamin-minerals  1 tablet Oral Daily Uzma Matthews PA-C      nitroglycerin  0 4 mg Sublingual Q5 Min PRN Laura Limon MD      remdesivir  100 mg Intravenous Q24H Uzma E Byron, PA-C      sodium chloride (PF)  3 mL Intravenous Q1H PRN ALINA Streeter      sodium chloride  50 mL/hr Intravenous Continuous Rosie Rayo MD 50 mL/hr (04/27/21 2116)        Today, Patient Was Seen By: Diamond Salas PA-C    ** Please Note: Dictation voice to text software may have been used in the creation of this document   **

## 2021-04-28 NOTE — ASSESSMENT & PLAN NOTE
Lab Results   Component Value Date    HGBA1C 7 6 (H) 07/13/2019     Results from last 7 days   Lab Units 04/28/21  0814 04/27/21  2104 04/27/21  1527 04/27/21  1106 04/27/21  0810 04/26/21  2240 04/26/21  1638   POC GLUCOSE mg/dl 161* 212* 184* 283* 61* 99 129     · On Tresiba at home, hold  · Continue sliding scale, CCO diet  · Hemoglobin A1c to be updated  · ACHS BG monitoring  · Adjust as needed

## 2021-04-28 NOTE — ASSESSMENT & PLAN NOTE
· Incidental finding on admission, CXR showing ground-glass opacities  · Mild COVID-19 treatment pathway  · COVID-19 vitamins  · Heparin t i d   · Check inflammatory markers, trend  · Discussed from does severe with patient on 04/27 and initiated, today is day 2/5  · Patient is not hypoxic, no indication for steroids or O2 supplement  · Patient afebrile, hold on antibiotics and check procalcitonin  · Monitor oxygen saturation, supplement needed to maintain > 90%  · Encourage incentive spirometer, ambulation within the room, self prone  Results from last 7 days   Lab Units 04/26/21  1238   WBC Thousand/uL 6 08

## 2021-04-28 NOTE — ASSESSMENT & PLAN NOTE
· Previous creatinine 2 years ago around 1 2  · Presented creatinine 1 7 unclear if BELKIS versus secondary to uncontrolled diabetes and hypertension  · Discontinue IV fluid in the COVID-19 pneumonia  · Monitor BMP while hospitalized, daily   Results from last 7 days   Lab Units 04/27/21  0524 04/26/21  1238   BUN mg/dL 21 19   CREATININE mg/dL 1 70* 1 77*   EGFR ml/min/1 73sq m 43 41

## 2021-04-28 NOTE — PLAN OF CARE
Problem: PAIN - ADULT  Goal: Verbalizes/displays adequate comfort level or baseline comfort level  Description: Interventions:  - Encourage patient to monitor pain and request assistance  - Assess pain using appropriate pain scale  - Administer analgesics based on type and severity of pain and evaluate response  - Implement non-pharmacological measures as appropriate and evaluate response  - Consider cultural and social influences on pain and pain management  - Notify physician/advanced practitioner if interventions unsuccessful or patient reports new pain  Outcome: Progressing     Problem: INFECTION - ADULT  Goal: Absence or prevention of progression during hospitalization  Description: INTERVENTIONS:  - Assess and monitor for signs and symptoms of infection  - Monitor lab/diagnostic results  - Monitor all insertion sites, i e  indwelling lines, tubes, and drains  - Monitor endotracheal if appropriate and nasal secretions for changes in amount and color  - [place] appropriate cooling/warming therapies per order  - Administer medications as ordered  - Instruct and encourage patient and family to use good hand hygiene technique  - Identify and instruct in appropriate isolation precautions for identified infection/condition  Outcome: Progressing  Goal: Absence of fever/infection during neutropenic period  Description: INTERVENTIONS:  - Monitor WBC    Outcome: Progressing     Problem: SAFETY ADULT  Goal: Patient will remain free of falls  Description: INTERVENTIONS:  - Assess patient frequently for physical needs  -  Identify cognitive and physical deficits and behaviors that affect risk of falls    -  [place] fall precautions as indicated by assessment   - Educate patient/family on patient safety including physical limitations  - Instruct patient to call for assistance with activity based on assessment  - Modify environment to reduce risk of injury  - Consider OT/PT consult to assist with strengthening/mobility  Outcome: Progressing  Goal: Maintain or return to baseline ADL function  Description: INTERVENTIONS:  -  Assess patient's ability to carry out ADLs; assess patient's baseline for ADL function and identify physical deficits which impact ability to perform ADLs (bathing, care of mouth/teeth, toileting, grooming, dressing, etc )  - Assess/evaluate cause of self-care deficits   - Assess range of motion  - Assess patient's mobility; develop plan if impaired  - Assess patient's need for assistive devices and provide as appropriate  - Encourage maximum independence but intervene and supervise when necessary  - Involve family in performance of ADLs  - Assess for home care needs following discharge   - Consider OT consult to assist with ADL evaluation and planning for discharge  - Provide patient education as appropriate  Outcome: Progressing  Goal: Maintain or return mobility status to optimal level  Description: INTERVENTIONS:  - Assess patient's baseline mobility status (ambulation, transfers, stairs, etc )    - Identify cognitive and physical deficits and behaviors that affect mobility  - Identify mobility aids required to assist with transfers and/or ambulation (gait belt, sit-to-stand, lift, walker, cane, etc )  - [place] fall precautions as indicated by assessment  - Record patient progress and toleration of activity level on Mobility SBAR; progress patient to next Phase/Stage  - Instruct patient to call for assistance with activity based on assessment  - Consider rehabilitation consult to assist with strengthening/weightbearing, etc   Outcome: Progressing     Problem: DISCHARGE PLANNING  Goal: Discharge to home or other facility with appropriate resources  Description: INTERVENTIONS:  - Identify barriers to discharge w/patient and caregiver  - Arrange for needed discharge resources and transportation as appropriate  - Identify discharge learning needs (meds, wound care, etc )  - Arrange for interpretive services to assist at discharge as needed  - Refer to Case Management Department for coordinating discharge planning if the patient needs post-hospital services based on physician/advanced practitioner order or complex needs related to functional status, cognitive ability, or social support system  Outcome: Progressing     Problem: Knowledge Deficit  Goal: Patient/family/caregiver demonstrates understanding of disease process, treatment plan, medications, and discharge instructions  Description: Complete learning assessment and assess knowledge base  Interventions:  - Provide teaching at level of understanding  - Provide teaching via preferred learning methods  Outcome: Progressing     Problem: Potential for Falls  Goal: Patient will remain free of falls  Description: INTERVENTIONS:  - Assess patient frequently for physical needs  -  Identify cognitive and physical deficits and behaviors that affect risk of falls    -  [place] fall precautions as indicated by assessment   - Educate patient/family on patient safety including physical limitations  - Instruct patient to call for assistance with activity based on assessment  - Modify environment to reduce risk of injury  - Consider OT/PT consult to assist with strengthening/mobility  Outcome: Progressing     Problem: CARDIOVASCULAR - ADULT  Goal: Maintains optimal cardiac output and hemodynamic stability  Description: INTERVENTIONS:  - Monitor I/O, vital signs and rhythm  - Monitor for S/S and trends of decreased cardiac output  - Administer and titrate ordered vasoactive medications to optimize hemodynamic stability  - Assess quality of pulses, skin color and temperature  - Assess for signs of decreased coronary artery perfusion  - Instruct patient to report change in severity of symptoms  Outcome: Progressing  Goal: Absence of cardiac dysrhythmias or at baseline rhythm  Description: INTERVENTIONS:  - Continuous cardiac monitoring, vital signs, obtain 12 lead EKG if ordered  - Administer antiarrhythmic and heart rate control medications as ordered  - Monitor electrolytes and administer replacement therapy as ordered  Outcome: Progressing     Problem: RESPIRATORY - ADULT  Goal: Achieves optimal ventilation and oxygenation  Description: INTERVENTIONS:  - Assess for changes in respiratory status  - Assess for changes in mentation and behavior  - Position to facilitate oxygenation and minimize respiratory effort  - Oxygen administered by appropriate delivery if ordered  - Initiate smoking cessation education as indicated  - Encourage broncho-pulmonary hygiene including cough, deep breathe, Incentive Spirometry  - Assess the need for suctioning and aspirate as needed  - Assess and instruct to report SOB or any respiratory difficulty  - Respiratory Therapy support as indicated  Outcome: Progressing     Problem: METABOLIC, FLUID AND ELECTROLYTES - ADULT  Goal: Electrolytes maintained within normal limits  Description: INTERVENTIONS:  - Monitor labs and assess patient for signs and symptoms of electrolyte imbalances  - Administer electrolyte replacement as ordered  - Monitor response to electrolyte replacements, including repeat lab results as appropriate  - Instruct patient on fluid and nutrition as appropriate  Outcome: Progressing  Goal: Fluid balance maintained  Description: INTERVENTIONS:  - Monitor labs   - Monitor I/O and WT  - Instruct patient on fluid and nutrition as appropriate  - Assess for signs & symptoms of volume excess or deficit  Outcome: Progressing  Goal: Glucose maintained within target range  Description: INTERVENTIONS:  - Monitor Blood Glucose as ordered  - Assess for signs and symptoms of hyperglycemia and hypoglycemia  - Administer ordered medications to maintain glucose within target range  - Assess nutritional intake and initiate nutrition service referral as needed  Outcome: Progressing     Problem: SKIN/TISSUE INTEGRITY - ADULT  Goal: Skin integrity remains intact  Description: INTERVENTIONS  - Identify patients at risk for skin breakdown  - Assess and monitor skin integrity  - Assess and monitor nutrition and hydration status  - Monitor labs (i e  albumin)  - Assess for incontinence   - Turn and reposition patient  - Assist with mobility/ambulation  - Relieve pressure over bony prominences  - Avoid friction and shearing  - Provide appropriate hygiene as needed including keeping skin clean and dry  - Evaluate need for skin moisturizer/barrier cream  - Collaborate with interdisciplinary team (i e  Nutrition, Rehabilitation, etc )   - Patient/family teaching  Outcome: Progressing  Goal: Incision(s), wounds(s) or drain site(s) healing without S/S of infection  Description: INTERVENTIONS  - Assess and document risk factors for skin impairment   - Assess and document dressing, incision, wound bed, drain sites and surrounding tissue  - Consider nutrition services referral as needed  - Oral mucous membranes remain intact  - Provide patient/ family education  Outcome: Progressing  Goal: Oral mucous membranes remain intact  Description: INTERVENTIONS  - Assess oral mucosa and hygiene practices  - Implement preventative oral hygiene regimen  - Implement oral medicated treatments as ordered  - Initiate Nutrition services referral as needed  Outcome: Progressing     Problem: HEMATOLOGIC - ADULT  Goal: Maintains hematologic stability  Description: INTERVENTIONS  - Assess for signs and symptoms of bleeding or hemorrhage  - Monitor labs  - Administer supportive blood products/factors as ordered and appropriate  Outcome: Progressing     Problem: MUSCULOSKELETAL - ADULT  Goal: Maintain or return mobility to safest level of function  Description: INTERVENTIONS:  - Assess patient's ability to carry out ADLs; assess patient's baseline for ADL function and identify physical deficits which impact ability to perform ADLs (bathing, care of mouth/teeth, toileting, grooming, dressing, etc )  - Assess/evaluate cause of self-care deficits   - Assess range of motion  - Assess patient's mobility  - Assess patient's need for assistive devices and provide as appropriate  - Encourage maximum independence but intervene and supervise when necessary  - Involve family in performance of ADLs  - Assess for home care needs following discharge   - Consider OT consult to assist with ADL evaluation and planning for discharge  - Provide patient education as appropriate  Outcome: Progressing  Goal: Maintain proper alignment of affected body part  Description: INTERVENTIONS:  - Support, maintain and protect limb and body alignment  - Provide patient/ family with appropriate education  Outcome: Progressing

## 2021-04-28 NOTE — ASSESSMENT & PLAN NOTE
· Hold lisinopril in setting of BELKIS  · Will continue amlodipine as initiated on admission with holding parameters  · Labetalol p r n   /93   Pulse 69   Temp 98 6 °F (37 °C)   Resp 16   Ht 5' 9" (1 753 m)   Wt 112 kg (245 lb 13 oz)   SpO2 95%   BMI 36 30 kg/m²

## 2021-04-28 NOTE — PLAN OF CARE
Problem: PAIN - ADULT  Goal: Verbalizes/displays adequate comfort level or baseline comfort level  Description: Interventions:  - Encourage patient to monitor pain and request assistance  - Assess pain using appropriate pain scale  - Administer analgesics based on type and severity of pain and evaluate response  - Implement non-pharmacological measures as appropriate and evaluate response  - Consider cultural and social influences on pain and pain management  - Notify physician/advanced practitioner if interventions unsuccessful or patient reports new pain  Outcome: Progressing     Problem: INFECTION - ADULT  Goal: Absence or prevention of progression during hospitalization  Description: INTERVENTIONS:  - Assess and monitor for signs and symptoms of infection  - Monitor lab/diagnostic results  - Monitor all insertion sites, i e  indwelling lines, tubes, and drains  - Monitor endotracheal if appropriate and nasal secretions for changes in amount and color  - [place] appropriate cooling/warming therapies per order  - Administer medications as ordered  - Instruct and encourage patient and family to use good hand hygiene technique  - Identify and instruct in appropriate isolation precautions for identified infection/condition  Outcome: Progressing  Goal: Absence of fever/infection during neutropenic period  Description: INTERVENTIONS:  - Monitor WBC    Outcome: Progressing     Problem: SAFETY ADULT  Goal: Patient will remain free of falls  Description: INTERVENTIONS:  - Assess patient frequently for physical needs  -  Identify cognitive and physical deficits and behaviors that affect risk of falls    -  [place] fall precautions as indicated by assessment   - Educate patient/family on patient safety including physical limitations  - Instruct patient to call for assistance with activity based on assessment  - Modify environment to reduce risk of injury  - Consider OT/PT consult to assist with strengthening/mobility  Outcome: Progressing  Goal: Maintain or return to baseline ADL function  Description: INTERVENTIONS:  -  Assess patient's ability to carry out ADLs; assess patient's baseline for ADL function and identify physical deficits which impact ability to perform ADLs (bathing, care of mouth/teeth, toileting, grooming, dressing, etc )  - Assess/evaluate cause of self-care deficits   - Assess range of motion  - Assess patient's mobility; develop plan if impaired  - Assess patient's need for assistive devices and provide as appropriate  - Encourage maximum independence but intervene and supervise when necessary  - Involve family in performance of ADLs  - Assess for home care needs following discharge   - Consider OT consult to assist with ADL evaluation and planning for discharge  - Provide patient education as appropriate  Outcome: Progressing  Goal: Maintain or return mobility status to optimal level  Description: INTERVENTIONS:  - Assess patient's baseline mobility status (ambulation, transfers, stairs, etc )    - Identify cognitive and physical deficits and behaviors that affect mobility  - Identify mobility aids required to assist with transfers and/or ambulation (gait belt, sit-to-stand, lift, walker, cane, etc )  - [place] fall precautions as indicated by assessment  - Record patient progress and toleration of activity level on Mobility SBAR; progress patient to next Phase/Stage  - Instruct patient to call for assistance with activity based on assessment  - Consider rehabilitation consult to assist with strengthening/weightbearing, etc   Outcome: Progressing     Problem: DISCHARGE PLANNING  Goal: Discharge to home or other facility with appropriate resources  Description: INTERVENTIONS:  - Identify barriers to discharge w/patient and caregiver  - Arrange for needed discharge resources and transportation as appropriate  - Identify discharge learning needs (meds, wound care, etc )  - Arrange for interpretive services to assist at discharge as needed  - Refer to Case Management Department for coordinating discharge planning if the patient needs post-hospital services based on physician/advanced practitioner order or complex needs related to functional status, cognitive ability, or social support system  Outcome: Progressing     Problem: Knowledge Deficit  Goal: Patient/family/caregiver demonstrates understanding of disease process, treatment plan, medications, and discharge instructions  Description: Complete learning assessment and assess knowledge base  Interventions:  - Provide teaching at level of understanding  - Provide teaching via preferred learning methods  Outcome: Progressing     Problem: Potential for Falls  Goal: Patient will remain free of falls  Description: INTERVENTIONS:  - Assess patient frequently for physical needs  -  Identify cognitive and physical deficits and behaviors that affect risk of falls    -  [place] fall precautions as indicated by assessment   - Educate patient/family on patient safety including physical limitations  - Instruct patient to call for assistance with activity based on assessment  - Modify environment to reduce risk of injury  - Consider OT/PT consult to assist with strengthening/mobility  Outcome: Progressing     Problem: CARDIOVASCULAR - ADULT  Goal: Maintains optimal cardiac output and hemodynamic stability  Description: INTERVENTIONS:  - Monitor I/O, vital signs and rhythm  - Monitor for S/S and trends of decreased cardiac output  - Administer and titrate ordered vasoactive medications to optimize hemodynamic stability  - Assess quality of pulses, skin color and temperature  - Assess for signs of decreased coronary artery perfusion  - Instruct patient to report change in severity of symptoms  Outcome: Progressing  Goal: Absence of cardiac dysrhythmias or at baseline rhythm  Description: INTERVENTIONS:  - Continuous cardiac monitoring, vital signs, obtain 12 lead EKG if ordered  - Administer antiarrhythmic and heart rate control medications as ordered  - Monitor electrolytes and administer replacement therapy as ordered  Outcome: Progressing     Problem: RESPIRATORY - ADULT  Goal: Achieves optimal ventilation and oxygenation  Description: INTERVENTIONS:  - Assess for changes in respiratory status  - Assess for changes in mentation and behavior  - Position to facilitate oxygenation and minimize respiratory effort  - Oxygen administered by appropriate delivery if ordered  - Initiate smoking cessation education as indicated  - Encourage broncho-pulmonary hygiene including cough, deep breathe, Incentive Spirometry  - Assess the need for suctioning and aspirate as needed  - Assess and instruct to report SOB or any respiratory difficulty  - Respiratory Therapy support as indicated  Outcome: Progressing     Problem: METABOLIC, FLUID AND ELECTROLYTES - ADULT  Goal: Electrolytes maintained within normal limits  Description: INTERVENTIONS:  - Monitor labs and assess patient for signs and symptoms of electrolyte imbalances  - Administer electrolyte replacement as ordered  - Monitor response to electrolyte replacements, including repeat lab results as appropriate  - Instruct patient on fluid and nutrition as appropriate  Outcome: Progressing  Goal: Fluid balance maintained  Description: INTERVENTIONS:  - Monitor labs   - Monitor I/O and WT  - Instruct patient on fluid and nutrition as appropriate  - Assess for signs & symptoms of volume excess or deficit  Outcome: Progressing  Goal: Glucose maintained within target range  Description: INTERVENTIONS:  - Monitor Blood Glucose as ordered  - Assess for signs and symptoms of hyperglycemia and hypoglycemia  - Administer ordered medications to maintain glucose within target range  - Assess nutritional intake and initiate nutrition service referral as needed  Outcome: Progressing     Problem: SKIN/TISSUE INTEGRITY - ADULT  Goal: Skin integrity remains intact  Description: INTERVENTIONS  - Identify patients at risk for skin breakdown  - Assess and monitor skin integrity  - Assess and monitor nutrition and hydration status  - Monitor labs (i e  albumin)  - Assess for incontinence   - Turn and reposition patient  - Assist with mobility/ambulation  - Relieve pressure over bony prominences  - Avoid friction and shearing  - Provide appropriate hygiene as needed including keeping skin clean and dry  - Evaluate need for skin moisturizer/barrier cream  - Collaborate with interdisciplinary team (i e  Nutrition, Rehabilitation, etc )   - Patient/family teaching  Outcome: Progressing  Goal: Incision(s), wounds(s) or drain site(s) healing without S/S of infection  Description: INTERVENTIONS  - Assess and document risk factors for skin impairment   - Assess and document dressing, incision, wound bed, drain sites and surrounding tissue  - Consider nutrition services referral as needed  - Oral mucous membranes remain intact  - Provide patient/ family education  Outcome: Progressing  Goal: Oral mucous membranes remain intact  Description: INTERVENTIONS  - Assess oral mucosa and hygiene practices  - Implement preventative oral hygiene regimen  - Implement oral medicated treatments as ordered  - Initiate Nutrition services referral as needed  Outcome: Progressing     Problem: HEMATOLOGIC - ADULT  Goal: Maintains hematologic stability  Description: INTERVENTIONS  - Assess for signs and symptoms of bleeding or hemorrhage  - Monitor labs  - Administer supportive blood products/factors as ordered and appropriate  Outcome: Progressing     Problem: MUSCULOSKELETAL - ADULT  Goal: Maintain or return mobility to safest level of function  Description: INTERVENTIONS:  - Assess patient's ability to carry out ADLs; assess patient's baseline for ADL function and identify physical deficits which impact ability to perform ADLs (bathing, care of mouth/teeth, toileting, grooming, dressing, etc )  - Assess/evaluate cause of self-care deficits   - Assess range of motion  - Assess patient's mobility  - Assess patient's need for assistive devices and provide as appropriate  - Encourage maximum independence but intervene and supervise when necessary  - Involve family in performance of ADLs  - Assess for home care needs following discharge   - Consider OT consult to assist with ADL evaluation and planning for discharge  - Provide patient education as appropriate  Outcome: Progressing  Goal: Maintain proper alignment of affected body part  Description: INTERVENTIONS:  - Support, maintain and protect limb and body alignment  - Provide patient/ family with appropriate education  Outcome: Progressing

## 2021-04-28 NOTE — ASSESSMENT & PLAN NOTE
· Presented with substernal chest pain, does have risk factors including hypertension and type 2 diabetes  · Initial EKG showed T-wave inversions in inferior leads  · Troponin flatly elevated, suspect non-MI elevated secondary to COVID PNA   · Check limited echo  · Telemetry unremarkable, discontinue  · P r n   Nitro  · Would likely benefit from stress test outpatient once cleared from COVID infection  · Suspect chest pain related to COVID infection

## 2021-04-29 ENCOUNTER — APPOINTMENT (INPATIENT)
Dept: NON INVASIVE DIAGNOSTICS | Facility: HOSPITAL | Age: 61
DRG: 137 | End: 2021-04-29
Payer: COMMERCIAL

## 2021-04-29 LAB
GLUCOSE SERPL-MCNC: 136 MG/DL (ref 65–140)
GLUCOSE SERPL-MCNC: 171 MG/DL (ref 65–140)
GLUCOSE SERPL-MCNC: 303 MG/DL (ref 65–140)
GLUCOSE SERPL-MCNC: 389 MG/DL (ref 65–140)
GLUCOSE SERPL-MCNC: 396 MG/DL (ref 65–140)

## 2021-04-29 PROCEDURE — 93308 TTE F-UP OR LMTD: CPT | Performed by: INTERNAL MEDICINE

## 2021-04-29 PROCEDURE — 82948 REAGENT STRIP/BLOOD GLUCOSE: CPT

## 2021-04-29 PROCEDURE — 93308 TTE F-UP OR LMTD: CPT

## 2021-04-29 PROCEDURE — 93321 DOPPLER ECHO F-UP/LMTD STD: CPT | Performed by: INTERNAL MEDICINE

## 2021-04-29 PROCEDURE — 93325 DOPPLER ECHO COLOR FLOW MAPG: CPT | Performed by: INTERNAL MEDICINE

## 2021-04-29 PROCEDURE — 99232 SBSQ HOSP IP/OBS MODERATE 35: CPT | Performed by: NURSE PRACTITIONER

## 2021-04-29 RX ORDER — BENZONATATE 100 MG/1
100 CAPSULE ORAL 3 TIMES DAILY PRN
Status: DISCONTINUED | OUTPATIENT
Start: 2021-04-29 | End: 2021-05-01 | Stop reason: HOSPADM

## 2021-04-29 RX ORDER — OXYCODONE HYDROCHLORIDE AND ACETAMINOPHEN 5; 325 MG/1; MG/1
1 TABLET ORAL EVERY 6 HOURS PRN
Status: DISCONTINUED | OUTPATIENT
Start: 2021-04-29 | End: 2021-05-01 | Stop reason: HOSPADM

## 2021-04-29 RX ORDER — TRAMADOL HYDROCHLORIDE 50 MG/1
50 TABLET ORAL ONCE
Status: COMPLETED | OUTPATIENT
Start: 2021-04-29 | End: 2021-04-29

## 2021-04-29 RX ADMIN — ZINC SULFATE 220 MG (50 MG) CAPSULE 220 MG: CAPSULE at 09:27

## 2021-04-29 RX ADMIN — INSULIN LISPRO 6 UNITS: 100 INJECTION, SOLUTION INTRAVENOUS; SUBCUTANEOUS at 16:25

## 2021-04-29 RX ADMIN — TRAMADOL HYDROCHLORIDE 50 MG: 50 TABLET, FILM COATED ORAL at 03:47

## 2021-04-29 RX ADMIN — REMDESIVIR 100 MG: 5 INJECTION INTRAVENOUS at 11:37

## 2021-04-29 RX ADMIN — HEPARIN SODIUM 5000 UNITS: 5000 INJECTION INTRAVENOUS; SUBCUTANEOUS at 16:25

## 2021-04-29 RX ADMIN — BENZONATATE 100 MG: 100 CAPSULE ORAL at 02:17

## 2021-04-29 RX ADMIN — MELATONIN TAB 3 MG 3 MG: 3 TAB at 21:53

## 2021-04-29 RX ADMIN — Medication 2000 UNITS: at 09:27

## 2021-04-29 RX ADMIN — BENZONATATE 100 MG: 100 CAPSULE ORAL at 21:53

## 2021-04-29 RX ADMIN — HEPARIN SODIUM 5000 UNITS: 5000 INJECTION INTRAVENOUS; SUBCUTANEOUS at 06:22

## 2021-04-29 RX ADMIN — AMLODIPINE BESYLATE 5 MG: 5 TABLET ORAL at 09:27

## 2021-04-29 RX ADMIN — INSULIN LISPRO 1 UNITS: 100 INJECTION, SOLUTION INTRAVENOUS; SUBCUTANEOUS at 09:28

## 2021-04-29 RX ADMIN — HEPARIN SODIUM 5000 UNITS: 5000 INJECTION INTRAVENOUS; SUBCUTANEOUS at 21:53

## 2021-04-29 RX ADMIN — ACETAMINOPHEN 650 MG: 325 TABLET, FILM COATED ORAL at 03:13

## 2021-04-29 RX ADMIN — INSULIN LISPRO 6 UNITS: 100 INJECTION, SOLUTION INTRAVENOUS; SUBCUTANEOUS at 11:38

## 2021-04-29 RX ADMIN — OXYCODONE HYDROCHLORIDE AND ACETAMINOPHEN 1000 MG: 500 TABLET ORAL at 09:27

## 2021-04-29 RX ADMIN — OXYCODONE HYDROCHLORIDE AND ACETAMINOPHEN 1000 MG: 500 TABLET ORAL at 21:53

## 2021-04-29 RX ADMIN — INSULIN LISPRO 3 UNITS: 100 INJECTION, SOLUTION INTRAVENOUS; SUBCUTANEOUS at 21:53

## 2021-04-29 RX ADMIN — OXYCODONE HYDROCHLORIDE AND ACETAMINOPHEN 1 TABLET: 5; 325 TABLET ORAL at 09:46

## 2021-04-29 RX ADMIN — OXYCODONE HYDROCHLORIDE AND ACETAMINOPHEN 1 TABLET: 5; 325 TABLET ORAL at 21:53

## 2021-04-29 NOTE — PLAN OF CARE
Problem: PAIN - ADULT  Goal: Verbalizes/displays adequate comfort level or baseline comfort level  Description: Interventions:  - Encourage patient to monitor pain and request assistance  - Assess pain using appropriate pain scale  - Administer analgesics based on type and severity of pain and evaluate response  - Implement non-pharmacological measures as appropriate and evaluate response  - Consider cultural and social influences on pain and pain management  - Notify physician/advanced practitioner if interventions unsuccessful or patient reports new pain  Outcome: Progressing     Problem: INFECTION - ADULT  Goal: Absence or prevention of progression during hospitalization  Description: INTERVENTIONS:  - Assess and monitor for signs and symptoms of infection  - Monitor lab/diagnostic results  - Monitor all insertion sites, i e  indwelling lines, tubes, and drains  - Monitor endotracheal if appropriate and nasal secretions for changes in amount and color  - [place] appropriate cooling/warming therapies per order  - Administer medications as ordered  - Instruct and encourage patient and family to use good hand hygiene technique  - Identify and instruct in appropriate isolation precautions for identified infection/condition  Outcome: Progressing  Goal: Absence of fever/infection during neutropenic period  Description: INTERVENTIONS:  - Monitor WBC    Outcome: Progressing     Problem: SAFETY ADULT  Goal: Patient will remain free of falls  Description: INTERVENTIONS:  - Assess patient frequently for physical needs  -  Identify cognitive and physical deficits and behaviors that affect risk of falls    -  [place] fall precautions as indicated by assessment   - Educate patient/family on patient safety including physical limitations  - Instruct patient to call for assistance with activity based on assessment  - Modify environment to reduce risk of injury  - Consider OT/PT consult to assist with strengthening/mobility  Outcome: Progressing  Goal: Maintain or return to baseline ADL function  Description: INTERVENTIONS:  -  Assess patient's ability to carry out ADLs; assess patient's baseline for ADL function and identify physical deficits which impact ability to perform ADLs (bathing, care of mouth/teeth, toileting, grooming, dressing, etc )  - Assess/evaluate cause of self-care deficits   - Assess range of motion  - Assess patient's mobility; develop plan if impaired  - Assess patient's need for assistive devices and provide as appropriate  - Encourage maximum independence but intervene and supervise when necessary  - Involve family in performance of ADLs  - Assess for home care needs following discharge   - Consider OT consult to assist with ADL evaluation and planning for discharge  - Provide patient education as appropriate  Outcome: Progressing  Goal: Maintain or return mobility status to optimal level  Description: INTERVENTIONS:  - Assess patient's baseline mobility status (ambulation, transfers, stairs, etc )    - Identify cognitive and physical deficits and behaviors that affect mobility  - Identify mobility aids required to assist with transfers and/or ambulation (gait belt, sit-to-stand, lift, walker, cane, etc )  - [place] fall precautions as indicated by assessment  - Record patient progress and toleration of activity level on Mobility SBAR; progress patient to next Phase/Stage  - Instruct patient to call for assistance with activity based on assessment  - Consider rehabilitation consult to assist with strengthening/weightbearing, etc   Outcome: Progressing     Problem: DISCHARGE PLANNING  Goal: Discharge to home or other facility with appropriate resources  Description: INTERVENTIONS:  - Identify barriers to discharge w/patient and caregiver  - Arrange for needed discharge resources and transportation as appropriate  - Identify discharge learning needs (meds, wound care, etc )  - Arrange for interpretive services to assist at discharge as needed  - Refer to Case Management Department for coordinating discharge planning if the patient needs post-hospital services based on physician/advanced practitioner order or complex needs related to functional status, cognitive ability, or social support system  Outcome: Progressing     Problem: Knowledge Deficit  Goal: Patient/family/caregiver demonstrates understanding of disease process, treatment plan, medications, and discharge instructions  Description: Complete learning assessment and assess knowledge base  Interventions:  - Provide teaching at level of understanding  - Provide teaching via preferred learning methods  Outcome: Progressing     Problem: Potential for Falls  Goal: Patient will remain free of falls  Description: INTERVENTIONS:  - Assess patient frequently for physical needs  -  Identify cognitive and physical deficits and behaviors that affect risk of falls    -  [place] fall precautions as indicated by assessment   - Educate patient/family on patient safety including physical limitations  - Instruct patient to call for assistance with activity based on assessment  - Modify environment to reduce risk of injury  - Consider OT/PT consult to assist with strengthening/mobility  Outcome: Progressing     Problem: CARDIOVASCULAR - ADULT  Goal: Maintains optimal cardiac output and hemodynamic stability  Description: INTERVENTIONS:  - Monitor I/O, vital signs and rhythm  - Monitor for S/S and trends of decreased cardiac output  - Administer and titrate ordered vasoactive medications to optimize hemodynamic stability  - Assess quality of pulses, skin color and temperature  - Assess for signs of decreased coronary artery perfusion  - Instruct patient to report change in severity of symptoms  Outcome: Progressing  Goal: Absence of cardiac dysrhythmias or at baseline rhythm  Description: INTERVENTIONS:  - Continuous cardiac monitoring, vital signs, obtain 12 lead EKG if ordered  - Administer antiarrhythmic and heart rate control medications as ordered  - Monitor electrolytes and administer replacement therapy as ordered  Outcome: Progressing     Problem: RESPIRATORY - ADULT  Goal: Achieves optimal ventilation and oxygenation  Description: INTERVENTIONS:  - Assess for changes in respiratory status  - Assess for changes in mentation and behavior  - Position to facilitate oxygenation and minimize respiratory effort  - Oxygen administered by appropriate delivery if ordered  - Initiate smoking cessation education as indicated  - Encourage broncho-pulmonary hygiene including cough, deep breathe, Incentive Spirometry  - Assess the need for suctioning and aspirate as needed  - Assess and instruct to report SOB or any respiratory difficulty  - Respiratory Therapy support as indicated  Outcome: Progressing     Problem: METABOLIC, FLUID AND ELECTROLYTES - ADULT  Goal: Electrolytes maintained within normal limits  Description: INTERVENTIONS:  - Monitor labs and assess patient for signs and symptoms of electrolyte imbalances  - Administer electrolyte replacement as ordered  - Monitor response to electrolyte replacements, including repeat lab results as appropriate  - Instruct patient on fluid and nutrition as appropriate  Outcome: Progressing  Goal: Fluid balance maintained  Description: INTERVENTIONS:  - Monitor labs   - Monitor I/O and WT  - Instruct patient on fluid and nutrition as appropriate  - Assess for signs & symptoms of volume excess or deficit  Outcome: Progressing  Goal: Glucose maintained within target range  Description: INTERVENTIONS:  - Monitor Blood Glucose as ordered  - Assess for signs and symptoms of hyperglycemia and hypoglycemia  - Administer ordered medications to maintain glucose within target range  - Assess nutritional intake and initiate nutrition service referral as needed  Outcome: Progressing     Problem: SKIN/TISSUE INTEGRITY - ADULT  Goal: Skin integrity remains intact  Description: INTERVENTIONS  - Identify patients at risk for skin breakdown  - Assess and monitor skin integrity  - Assess and monitor nutrition and hydration status  - Monitor labs (i e  albumin)  - Assess for incontinence   - Turn and reposition patient  - Assist with mobility/ambulation  - Relieve pressure over bony prominences  - Avoid friction and shearing  - Provide appropriate hygiene as needed including keeping skin clean and dry  - Evaluate need for skin moisturizer/barrier cream  - Collaborate with interdisciplinary team (i e  Nutrition, Rehabilitation, etc )   - Patient/family teaching  Outcome: Progressing  Goal: Incision(s), wounds(s) or drain site(s) healing without S/S of infection  Description: INTERVENTIONS  - Assess and document risk factors for skin impairment   - Assess and document dressing, incision, wound bed, drain sites and surrounding tissue  - Consider nutrition services referral as needed  - Oral mucous membranes remain intact  - Provide patient/ family education  Outcome: Progressing  Goal: Oral mucous membranes remain intact  Description: INTERVENTIONS  - Assess oral mucosa and hygiene practices  - Implement preventative oral hygiene regimen  - Implement oral medicated treatments as ordered  - Initiate Nutrition services referral as needed  Outcome: Progressing     Problem: HEMATOLOGIC - ADULT  Goal: Maintains hematologic stability  Description: INTERVENTIONS  - Assess for signs and symptoms of bleeding or hemorrhage  - Monitor labs  - Administer supportive blood products/factors as ordered and appropriate  Outcome: Progressing     Problem: MUSCULOSKELETAL - ADULT  Goal: Maintain or return mobility to safest level of function  Description: INTERVENTIONS:  - Assess patient's ability to carry out ADLs; assess patient's baseline for ADL function and identify physical deficits which impact ability to perform ADLs (bathing, care of mouth/teeth, toileting, grooming, dressing, etc )  - Assess/evaluate cause of self-care deficits   - Assess range of motion  - Assess patient's mobility  - Assess patient's need for assistive devices and provide as appropriate  - Encourage maximum independence but intervene and supervise when necessary  - Involve family in performance of ADLs  - Assess for home care needs following discharge   - Consider OT consult to assist with ADL evaluation and planning for discharge  - Provide patient education as appropriate  Outcome: Progressing  Goal: Maintain proper alignment of affected body part  Description: INTERVENTIONS:  - Support, maintain and protect limb and body alignment  - Provide patient/ family with appropriate education  Outcome: Progressing

## 2021-04-29 NOTE — ASSESSMENT & PLAN NOTE
· Presented with substernal chest pain, does have risk factors including hypertension and type 2 diabetes  · Initial EKG showed T-wave inversions in inferior leads  · Troponin flatly elevated, suspect non-MI elevated secondary to COVID PNA   · Check limited echo, pending for today  · Telemetry unremarkable, discontinue  · P r n   Nitro  · Would likely benefit from stress test outpatient once cleared from COVID infection  · Suspect chest pain related to COVID infection

## 2021-04-29 NOTE — PROGRESS NOTES
[address] [facility]     Progress Note Marcos Case 1960, 63 y o  male MRN: [id number]  Unit/Bed#: [facility] Encounter: [id number]  Primary Care Provider: No primary care provider on file  Date and time admitted to hospital: 4/26/2021 12:07 PM    * Chest pain  Assessment & Plan  · Presented with substernal chest pain, does have risk factors including hypertension and type 2 diabetes  · Initial EKG showed T-wave inversions in inferior leads  · Troponin flatly elevated, suspect non-MI elevated secondary to COVID PNA   · Check limited echo, pending for today  · Telemetry unremarkable, discontinue  · P r n   Nitro  · Would likely benefit from stress test outpatient once cleared from COVID infection  · Suspect chest pain related to COVID infection     Class 2 obesity due to excess calories without serious comorbidity with body mass index (BMI) of 36 0 to 36 9 in adult  Assessment & Plan  · Dietary and lifestyle modifications advised  · Independent risk factor for [id number] complications    [id number] virus infection  Assessment & Plan  · Incidental finding on admission, CXR showing ground-glass opacities  · Mild COVID-19 treatment pathway  · COVID-19 vitamins  · Heparin t i d   · Check inflammatory markers, trend  · Discussed Remdesivir with patient on 04/27 and initiated, today is day 3/5  · Patient is not hypoxic, no indication for steroids or O2 supplement  · Patient afebrile, hold on antibiotics and check procalcitonin  · Monitor oxygen saturation, supplement needed to maintain > 90%  · Encourage incentive spirometer, ambulation within the room, self prone    Results from last 7 days   Lab Units 04/28/21  0935 04/26/21  1238   WBC Thousand/uL 5 11 6 08   FERRITIN ng/mL 550*  --    D-DIMER QUANTITATIVE ug/ml FEU 0 90*  --    CRP mg/L 88 2*  --        Elevated serum creatinine  Assessment & Plan  · Previous creatinine 2 years ago around 1 2  · Presented creatinine 1 7 unclear if BELKIS versus secondary to uncontrolled diabetes and hypertension  · Discontinue IV fluid in the COVID-19 pneumonia  · Monitor BMP while hospitalized, daily     Results from last 7 days   Lab Units 04/28/21  0935 04/27/21  0524 04/26/21  1238   BUN mg/dL 20 21 19   CREATININE mg/dL 1 64* 1 70* 1 77*   EGFR ml/min/1 73sq m 44 43 41       Essential hypertension  Assessment & Plan  · Hold lisinopril in setting of BELKIS  · Will continue amlodipine as initiated on admission with holding parameters  · Labetalol p r n     /70   Pulse 69   Temp 97 8 °F (36 6 °C)   Resp 20   Ht 5' 9" (1 753 m)   Wt 112 kg (245 lb 13 oz)   SpO2 96%   BMI 36 30 kg/m²       Type 2 diabetes mellitus with kidney complication, with long-term current use of insulin [facility])  Assessment & Plan    Lab Results   Component Value Date    HGBA1C 10 5 (H) 04/28/2021     Results from last 7 days   Lab Units 04/29/21  0712 04/28/21  2126 04/28/21  1608 04/28/21  1116 04/28/21  0814 04/27/21  2104 04/27/21  1527   POC GLUCOSE mg/dl 171* 240* 214* 318* 161* 212* 184*     · On Tresiba at home, hold  · Continue sliding scale, CCO diet  · Hemoglobin A1c noted to be 10 5 which is up from 7 6 in 2019 which it was last checked  · ACHS BG monitoring  · Adjust as needed    VTE Pharmacologic Prophylaxis:   Pharmacologic: Heparin  Mechanical VTE Prophylaxis in Place: Yes    Patient Centered Rounds: I have performed bedside rounds with nursing staff today  Discussions with Specialists or Other Care Team Provider: Case Management    Education and Discussions with Family / Patient: Patient    Time Spent for Care: 20 minutes  More than 50% of total time spent on counseling and coordination of care as described above      Current Length of Stay: 2 day(s)    Current Patient Status: Inpatient   Certification Statement: The patient will continue to require additional inpatient hospital stay due to ongoing treatment in setting of covid-19 infection, need for echo today     Discharge Plan: Not medically clear; plan for Saturday after last dose of Remdesivir  Code Status: Level 1 - Full Code      Subjective:   Patient resting in bed at this time  Complains of a dry hacking cough and some discomfort with coughing  States he had a rough night but overall is feeling better this morning  Denies complaints of fever, chills  Denies nausea/vomiting  Discussed that today is day 3 of Remdesivir for patient, patient agreeable to more doses at this time  Objective:     Vitals:   Temp (24hrs), Av 2 °F (36 8 °C), Min:97 8 °F (36 6 °C), Max:98 5 °F (36 9 °C)    Temp:  [97 8 °F (36 6 °C)-98 5 °F (36 9 °C)] 97 8 °F (36 6 °C)  HR:  [59-75] 69  Resp:  [18-20] 20  BP: (135-142)/(70-88) 135/70  SpO2:  [90 %-96 %] 96 %  Body mass index is 36 3 kg/m²  Input and Output Summary (last 24 hours): Intake/Output Summary (Last 24 hours) at 2021 0820  Last data filed at 2021 0700  Gross per 24 hour   Intake 1360 ml   Output 300 ml   Net 1060 ml       Physical Exam:     Physical Exam  Vitals signs and nursing note reviewed  Constitutional:       Appearance: He is obese  He is not ill-appearing  Cardiovascular:      Rate and Rhythm: Normal rate  Pulses: Normal pulses  Heart sounds: Normal heart sounds  Pulmonary:      Effort: Pulmonary effort is normal       Breath sounds: Decreased breath sounds present  Comments: Dry, hacking cough  Abdominal:      General: Bowel sounds are normal       Palpations: Abdomen is soft  Comments: Round, obese   Musculoskeletal: Normal range of motion  Skin:     General: Skin is warm and dry  Capillary Refill: Capillary refill takes less than 2 seconds  Neurological:      Mental Status: He is alert and oriented to person, place, and time     Psychiatric:         Mood and Affect: Mood normal        Additional Data:     Labs:    Results from last 7 days   Lab Units 21  0935   WBC Thousand/uL 5 11 HEMOGLOBIN g/dL 14 1   HEMATOCRIT % 40 7   PLATELETS Thousands/uL 174   NEUTROS PCT % 75   LYMPHS PCT % 18   MONOS PCT % 7   EOS PCT % 0     Results from last 7 days   Lab Units 04/28/21  0935   SODIUM mmol/L 137   POTASSIUM mmol/L 4 1   CHLORIDE mmol/L 103   CO2 mmol/L 21   BUN mg/dL 20   CREATININE mg/dL 1 64*   ANION GAP mmol/L 13   CALCIUM mg/dL 7 4*   ALBUMIN g/dL 2 3*   TOTAL BILIRUBIN mg/dL 0 63   ALK PHOS U/L 65   ALT U/L 23   AST U/L 29   GLUCOSE RANDOM mg/dL 323*         Results from last 7 days   Lab Units 04/29/21  0712 04/28/21  2126 04/28/21  1608 04/28/21  1116 04/28/21  0814 04/27/21  2104 04/27/21  1527 04/27/21  1106 04/27/21  0810 04/26/21  2240 04/26/21  1638   POC GLUCOSE mg/dl 171* 240* 214* 318* 161* 212* 184* 283* 61* 99 129     Results from last 7 days   Lab Units 04/28/21  0935   HEMOGLOBIN A1C % 10 5*             * I Have Reviewed All Lab Data Listed Above    * Additional Pertinent Lab Tests Reviewed: No New Labs Available For Today    Imaging:    Imaging Reports Reviewed Today Include: No new imaging to review  Imaging Personally Reviewed by Myself Includes:  None    Recent Cultures (last 7 days):           Last 24 Hours Medication List:   Current Facility-Administered Medications   Medication Dose Route Frequency Provider Last Rate    acetaminophen  650 mg Oral Q6H PRN Javda Jaimes MD      amLODIPine  5 mg Oral Daily Javad Jaimes MD      ascorbic acid  1,000 mg Oral Q12H [facility] Henrique Xie PA-C      benzonatate  100 mg Oral TID PRN ALINA Melo      cholecalciferol  2,000 Units Oral Daily Uzma Matthews PA-C      heparin (porcine)  5,000 Units Subcutaneous Q8H [facility] Javad Jaimes MD      hydrALAZINE  5 mg Intravenous Q6H PRN Javad Jaimes MD      insulin lispro  1-5 Units Subcutaneous HS Javad Jaimes MD      insulin lispro  1-6 Units Subcutaneous TID AC Javad Jaimes MD      melatonin  3 mg Oral HS Jannette Robbins PA-C      zinc sulfate  220 mg Oral Daily Weston KOEHLER CHA Matthews      Followed by   Maricela Harada ON 5/4/2021] multivitamin-minerals  1 tablet Oral Daily Uzma Matthews PA-C      nitroglycerin  0 4 mg Sublingual Q5 Min PRN Laura Limon MD      oxyCODONE-acetaminophen  1 tablet Oral Q6H PRN ALINA Bragg      remdesivir  100 mg Intravenous Q24H Uzma Matthews PA-C      sodium chloride (PF)  3 mL Intravenous Q1H PRN ALINA Bridges          Today, Patient Was Seen By: ALINA Bragg    ** Please Note: Dictation voice to text software may have been used in the creation of this document   **

## 2021-04-29 NOTE — CASE MANAGEMENT
LOS: 2    CM s/w pt via phone to complete assessment  Pt lives in [place] with his mother and sister  Pt lives in house that has 2-3 huma with railings  Pt denies dme  Pt denies hx of rehab, vna, mh, and sa  Pt uses [facility] and does not have pcp  Pt's mother, Lesa Cue is preferred hcr  Pt is unemployed d/t covid  Pt drives  Pt's mother will   CM reviewed discharge planning process including the following: identifying help at home, patient preference for discharge planning needs, pharmacy preference, and availability of treatment team to discuss questions or concerns patient and/or family may have regarding understanding medications and recognizing signs and symptoms once discharged  CM also encouraged patient to follow up with all recommended appointments after discharge  Patient advised of importance for patient and family to participate in managing patients medical well being  CM discussed insurance and pt confirmed he does not have insurance  Pt states he completed MA Application and fully intends on submitting requested documents when he gets home and can gather them  Pt requested to be dc'ed Saturday at 1500 after last dose Remdesivir  CHEN reported she would notify [facility]  CM updated SLIM  CM Dept to follow pt through dc

## 2021-04-29 NOTE — ASSESSMENT & PLAN NOTE
Lab Results   Component Value Date    HGBA1C 10 5 (H) 04/28/2021     Results from last 7 days   Lab Units 04/29/21  0712 04/28/21  2126 04/28/21  1608 04/28/21  1116 04/28/21  0814 04/27/21  2104 04/27/21  1527   POC GLUCOSE mg/dl 171* 240* 214* 318* 161* 212* 184*     · On Tresiba at home, hold  · Continue sliding scale, CCO diet  · Hemoglobin A1c noted to be 10 5 which is up from 7 6 in 2019 which it was last checked    · ACHS BG monitoring  · Adjust as needed

## 2021-04-29 NOTE — ASSESSMENT & PLAN NOTE
· Incidental finding on admission, CXR showing ground-glass opacities  · Mild COVID-19 treatment pathway  · COVID-19 vitamins  · Heparin t i d   · Check inflammatory markers, trend  · Discussed Remdesivir with patient on 04/27 and initiated, today is day 3/5  · Patient is not hypoxic, no indication for steroids or O2 supplement  · Patient afebrile, hold on antibiotics and check procalcitonin  · Monitor oxygen saturation, supplement needed to maintain > 90%  · Encourage incentive spirometer, ambulation within the room, self prone    Results from last 7 days   Lab Units 04/28/21  0935 04/26/21  1238   WBC Thousand/uL 5 11 6 08   FERRITIN ng/mL 550*  --    D-DIMER QUANTITATIVE ug/ml FEU 0 90*  --    CRP mg/L 88 2*  --

## 2021-04-29 NOTE — ASSESSMENT & PLAN NOTE
· Hold lisinopril in setting of BELKIS  · Will continue amlodipine as initiated on admission with holding parameters  · Labetalol p r n     /70   Pulse 69   Temp 97 8 °F (36 6 °C)   Resp 20   Ht 5' 9" (1 753 m)   Wt 112 kg (245 lb 13 oz)   SpO2 96%   BMI 36 30 kg/m²

## 2021-04-29 NOTE — PLAN OF CARE
Problem: PAIN - ADULT  Goal: Verbalizes/displays adequate comfort level or baseline comfort level  Description: Interventions:  - Encourage patient to monitor pain and request assistance  - Assess pain using appropriate pain scale  - Administer analgesics based on type and severity of pain and evaluate response  - Implement non-pharmacological measures as appropriate and evaluate response  - Consider cultural and social influences on pain and pain management  - Notify physician/advanced practitioner if interventions unsuccessful or patient reports new pain  Outcome: Progressing     Problem: INFECTION - ADULT  Goal: Absence or prevention of progression during hospitalization  Description: INTERVENTIONS:  - Assess and monitor for signs and symptoms of infection  - Monitor lab/diagnostic results  - Monitor all insertion sites, i e  indwelling lines, tubes, and drains  - Monitor endotracheal if appropriate and nasal secretions for changes in amount and color  - Anna appropriate cooling/warming therapies per order  - Administer medications as ordered  - Instruct and encourage patient and family to use good hand hygiene technique  - Identify and instruct in appropriate isolation precautions for identified infection/condition  Outcome: Progressing  Goal: Absence of fever/infection during neutropenic period  Description: INTERVENTIONS:  - Monitor WBC    Outcome: Progressing     Problem: SAFETY ADULT  Goal: Patient will remain free of falls  Description: INTERVENTIONS:  - Assess patient frequently for physical needs  -  Identify cognitive and physical deficits and behaviors that affect risk of falls    -  Anna fall precautions as indicated by assessment   - Educate patient/family on patient safety including physical limitations  - Instruct patient to call for assistance with activity based on assessment  - Modify environment to reduce risk of injury  - Consider OT/PT consult to assist with strengthening/mobility  Outcome: Progressing  Goal: Maintain or return to baseline ADL function  Description: INTERVENTIONS:  -  Assess patient's ability to carry out ADLs; assess patient's baseline for ADL function and identify physical deficits which impact ability to perform ADLs (bathing, care of mouth/teeth, toileting, grooming, dressing, etc )  - Assess/evaluate cause of self-care deficits   - Assess range of motion  - Assess patient's mobility; develop plan if impaired  - Assess patient's need for assistive devices and provide as appropriate  - Encourage maximum independence but intervene and supervise when necessary  - Involve family in performance of ADLs  - Assess for home care needs following discharge   - Consider OT consult to assist with ADL evaluation and planning for discharge  - Provide patient education as appropriate  Outcome: Progressing  Goal: Maintain or return mobility status to optimal level  Description: INTERVENTIONS:  - Assess patient's baseline mobility status (ambulation, transfers, stairs, etc )    - Identify cognitive and physical deficits and behaviors that affect mobility  - Identify mobility aids required to assist with transfers and/or ambulation (gait belt, sit-to-stand, lift, walker, cane, etc )  - [place] fall precautions as indicated by assessment  - Record patient progress and toleration of activity level on Mobility SBAR; progress patient to next Phase/Stage  - Instruct patient to call for assistance with activity based on assessment  - Consider rehabilitation consult to assist with strengthening/weightbearing, etc   Outcome: Progressing     Problem: DISCHARGE PLANNING  Goal: Discharge to home or other facility with appropriate resources  Description: INTERVENTIONS:  - Identify barriers to discharge w/patient and caregiver  - Arrange for needed discharge resources and transportation as appropriate  - Identify discharge learning needs (meds, wound care, etc )  - Arrange for interpretive services to assist at discharge as needed  - Refer to Case Management Department for coordinating discharge planning if the patient needs post-hospital services based on physician/advanced practitioner order or complex needs related to functional status, cognitive ability, or social support system  Outcome: Progressing     Problem: Knowledge Deficit  Goal: Patient/family/caregiver demonstrates understanding of disease process, treatment plan, medications, and discharge instructions  Description: Complete learning assessment and assess knowledge base  Interventions:  - Provide teaching at level of understanding  - Provide teaching via preferred learning methods  Outcome: Progressing     Problem: Potential for Falls  Goal: Patient will remain free of falls  Description: INTERVENTIONS:  - Assess patient frequently for physical needs  -  Identify cognitive and physical deficits and behaviors that affect risk of falls    -  Odd fall precautions as indicated by assessment   - Educate patient/family on patient safety including physical limitations  - Instruct patient to call for assistance with activity based on assessment  - Modify environment to reduce risk of injury  - Consider OT/PT consult to assist with strengthening/mobility  Outcome: Progressing     Problem: CARDIOVASCULAR - ADULT  Goal: Maintains optimal cardiac output and hemodynamic stability  Description: INTERVENTIONS:  - Monitor I/O, vital signs and rhythm  - Monitor for S/S and trends of decreased cardiac output  - Administer and titrate ordered vasoactive medications to optimize hemodynamic stability  - Assess quality of pulses, skin color and temperature  - Assess for signs of decreased coronary artery perfusion  - Instruct patient to report change in severity of symptoms  Outcome: Progressing  Goal: Absence of cardiac dysrhythmias or at baseline rhythm  Description: INTERVENTIONS:  - Continuous cardiac monitoring, vital signs, obtain 12 lead EKG if ordered  - Administer antiarrhythmic and heart rate control medications as ordered  - Monitor electrolytes and administer replacement therapy as ordered  Outcome: Progressing     Problem: RESPIRATORY - ADULT  Goal: Achieves optimal ventilation and oxygenation  Description: INTERVENTIONS:  - Assess for changes in respiratory status  - Assess for changes in mentation and behavior  - Position to facilitate oxygenation and minimize respiratory effort  - Oxygen administered by appropriate delivery if ordered  - Initiate smoking cessation education as indicated  - Encourage broncho-pulmonary hygiene including cough, deep breathe, Incentive Spirometry  - Assess the need for suctioning and aspirate as needed  - Assess and instruct to report SOB or any respiratory difficulty  - Respiratory Therapy support as indicated  Outcome: Progressing     Problem: METABOLIC, FLUID AND ELECTROLYTES - ADULT  Goal: Electrolytes maintained within normal limits  Description: INTERVENTIONS:  - Monitor labs and assess patient for signs and symptoms of electrolyte imbalances  - Administer electrolyte replacement as ordered  - Monitor response to electrolyte replacements, including repeat lab results as appropriate  - Instruct patient on fluid and nutrition as appropriate  Outcome: Progressing  Goal: Fluid balance maintained  Description: INTERVENTIONS:  - Monitor labs   - Monitor I/O and WT  - Instruct patient on fluid and nutrition as appropriate  - Assess for signs & symptoms of volume excess or deficit  Outcome: Progressing  Goal: Glucose maintained within target range  Description: INTERVENTIONS:  - Monitor Blood Glucose as ordered  - Assess for signs and symptoms of hyperglycemia and hypoglycemia  - Administer ordered medications to maintain glucose within target range  - Assess nutritional intake and initiate nutrition service referral as needed  Outcome: Progressing     Problem: SKIN/TISSUE INTEGRITY - ADULT  Goal: Skin integrity remains intact  Description: INTERVENTIONS  - Identify patients at risk for skin breakdown  - Assess and monitor skin integrity  - Assess and monitor nutrition and hydration status  - Monitor labs (i e  albumin)  - Assess for incontinence   - Turn and reposition patient  - Assist with mobility/ambulation  - Relieve pressure over bony prominences  - Avoid friction and shearing  - Provide appropriate hygiene as needed including keeping skin clean and dry  - Evaluate need for skin moisturizer/barrier cream  - Collaborate with interdisciplinary team (i e  Nutrition, Rehabilitation, etc )   - Patient/family teaching  Outcome: Progressing  Goal: Incision(s), wounds(s) or drain site(s) healing without S/S of infection  Description: INTERVENTIONS  - Assess and document risk factors for skin impairment   - Assess and document dressing, incision, wound bed, drain sites and surrounding tissue  - Consider nutrition services referral as needed  - Oral mucous membranes remain intact  - Provide patient/ family education  Outcome: Progressing  Goal: Oral mucous membranes remain intact  Description: INTERVENTIONS  - Assess oral mucosa and hygiene practices  - Implement preventative oral hygiene regimen  - Implement oral medicated treatments as ordered  - Initiate Nutrition services referral as needed  Outcome: Progressing     Problem: HEMATOLOGIC - ADULT  Goal: Maintains hematologic stability  Description: INTERVENTIONS  - Assess for signs and symptoms of bleeding or hemorrhage  - Monitor labs  - Administer supportive blood products/factors as ordered and appropriate  Outcome: Progressing     Problem: MUSCULOSKELETAL - ADULT  Goal: Maintain or return mobility to safest level of function  Description: INTERVENTIONS:  - Assess patient's ability to carry out ADLs; assess patient's baseline for ADL function and identify physical deficits which impact ability to perform ADLs (bathing, care of mouth/teeth, toileting, grooming, dressing, etc )  - Assess/evaluate cause of self-care deficits   - Assess range of motion  - Assess patient's mobility  - Assess patient's need for assistive devices and provide as appropriate  - Encourage maximum independence but intervene and supervise when necessary  - Involve family in performance of ADLs  - Assess for home care needs following discharge   - Consider OT consult to assist with ADL evaluation and planning for discharge  - Provide patient education as appropriate  Outcome: Progressing  Goal: Maintain proper alignment of affected body part  Description: INTERVENTIONS:  - Support, maintain and protect limb and body alignment  - Provide patient/ family with appropriate education  Outcome: Progressing

## 2021-04-30 LAB
ALBUMIN SERPL BCP-MCNC: 2.3 G/DL (ref 3.5–5)
ALP SERPL-CCNC: 72 U/L (ref 46–116)
ALT SERPL W P-5'-P-CCNC: 28 U/L (ref 12–78)
ANION GAP SERPL CALCULATED.3IONS-SCNC: 12 MMOL/L (ref 4–13)
AST SERPL W P-5'-P-CCNC: 33 U/L (ref 5–45)
BILIRUB SERPL-MCNC: 0.65 MG/DL (ref 0.2–1)
BUN SERPL-MCNC: 20 MG/DL (ref 5–25)
CALCIUM ALBUM COR SERPL-MCNC: 9.4 MG/DL (ref 8.3–10.1)
CALCIUM SERPL-MCNC: 8 MG/DL (ref 8.3–10.1)
CHLORIDE SERPL-SCNC: 103 MMOL/L (ref 100–108)
CO2 SERPL-SCNC: 19 MMOL/L (ref 21–32)
CREAT SERPL-MCNC: 1.52 MG/DL (ref 0.6–1.3)
CRP SERPL QL: 130.2 MG/L
D DIMER PPP FEU-MCNC: 0.73 UG/ML FEU
ERYTHROCYTE [DISTWIDTH] IN BLOOD BY AUTOMATED COUNT: 12.3 % (ref 11.6–15.1)
FERRITIN SERPL-MCNC: 493 NG/ML (ref 8–388)
GFR SERPL CREATININE-BSD FRML MDRD: 49 ML/MIN/1.73SQ M
GLUCOSE SERPL-MCNC: 234 MG/DL (ref 65–140)
GLUCOSE SERPL-MCNC: 251 MG/DL (ref 65–140)
GLUCOSE SERPL-MCNC: 259 MG/DL (ref 65–140)
GLUCOSE SERPL-MCNC: 365 MG/DL (ref 65–140)
GLUCOSE SERPL-MCNC: 386 MG/DL (ref 65–140)
HCT VFR BLD AUTO: 40.6 % (ref 36.5–49.3)
HGB BLD-MCNC: 13.9 G/DL (ref 12–17)
MCH RBC QN AUTO: 28.4 PG (ref 26.8–34.3)
MCHC RBC AUTO-ENTMCNC: 34.2 G/DL (ref 31.4–37.4)
MCV RBC AUTO: 83 FL (ref 82–98)
PLATELET # BLD AUTO: 231 THOUSANDS/UL (ref 149–390)
PMV BLD AUTO: 10.6 FL (ref 8.9–12.7)
POTASSIUM SERPL-SCNC: 4.3 MMOL/L (ref 3.5–5.3)
PROT SERPL-MCNC: 6.5 G/DL (ref 6.4–8.2)
RBC # BLD AUTO: 4.89 MILLION/UL (ref 3.88–5.62)
SODIUM SERPL-SCNC: 134 MMOL/L (ref 136–145)
WBC # BLD AUTO: 8.28 THOUSAND/UL (ref 4.31–10.16)

## 2021-04-30 PROCEDURE — 99232 SBSQ HOSP IP/OBS MODERATE 35: CPT | Performed by: NURSE PRACTITIONER

## 2021-04-30 PROCEDURE — 85027 COMPLETE CBC AUTOMATED: CPT | Performed by: NURSE PRACTITIONER

## 2021-04-30 PROCEDURE — 86140 C-REACTIVE PROTEIN: CPT | Performed by: NURSE PRACTITIONER

## 2021-04-30 PROCEDURE — 80053 COMPREHEN METABOLIC PANEL: CPT | Performed by: NURSE PRACTITIONER

## 2021-04-30 PROCEDURE — 82728 ASSAY OF FERRITIN: CPT | Performed by: NURSE PRACTITIONER

## 2021-04-30 PROCEDURE — 85379 FIBRIN DEGRADATION QUANT: CPT | Performed by: NURSE PRACTITIONER

## 2021-04-30 PROCEDURE — 82948 REAGENT STRIP/BLOOD GLUCOSE: CPT

## 2021-04-30 RX ORDER — SODIUM CHLORIDE 9 MG/ML
50 INJECTION, SOLUTION INTRAVENOUS CONTINUOUS
Status: DISCONTINUED | OUTPATIENT
Start: 2021-04-30 | End: 2021-04-30

## 2021-04-30 RX ORDER — INSULIN GLARGINE 100 [IU]/ML
44 INJECTION, SOLUTION SUBCUTANEOUS
Status: DISCONTINUED | OUTPATIENT
Start: 2021-04-30 | End: 2021-05-01 | Stop reason: HOSPADM

## 2021-04-30 RX ORDER — DEXAMETHASONE SODIUM PHOSPHATE 4 MG/ML
6 INJECTION, SOLUTION INTRA-ARTICULAR; INTRALESIONAL; INTRAMUSCULAR; INTRAVENOUS; SOFT TISSUE ONCE
Status: COMPLETED | OUTPATIENT
Start: 2021-04-30 | End: 2021-04-30

## 2021-04-30 RX ADMIN — INSULIN LISPRO 6 UNITS: 100 INJECTION, SOLUTION INTRAVENOUS; SUBCUTANEOUS at 08:30

## 2021-04-30 RX ADMIN — INSULIN LISPRO 10 UNITS: 100 INJECTION, SOLUTION INTRAVENOUS; SUBCUTANEOUS at 17:30

## 2021-04-30 RX ADMIN — HEPARIN SODIUM 5000 UNITS: 5000 INJECTION INTRAVENOUS; SUBCUTANEOUS at 05:57

## 2021-04-30 RX ADMIN — HEPARIN SODIUM 5000 UNITS: 5000 INJECTION INTRAVENOUS; SUBCUTANEOUS at 14:30

## 2021-04-30 RX ADMIN — INSULIN LISPRO 4 UNITS: 100 INJECTION, SOLUTION INTRAVENOUS; SUBCUTANEOUS at 21:41

## 2021-04-30 RX ADMIN — DEXAMETHASONE SODIUM PHOSPHATE 6 MG: 4 INJECTION INTRA-ARTICULAR; INTRALESIONAL; INTRAMUSCULAR; INTRAVENOUS; SOFT TISSUE at 11:00

## 2021-04-30 RX ADMIN — INSULIN LISPRO 7 UNITS: 100 INJECTION, SOLUTION INTRAVENOUS; SUBCUTANEOUS at 17:30

## 2021-04-30 RX ADMIN — Medication 2000 UNITS: at 08:28

## 2021-04-30 RX ADMIN — ZINC SULFATE 220 MG (50 MG) CAPSULE 220 MG: CAPSULE at 08:28

## 2021-04-30 RX ADMIN — OXYCODONE HYDROCHLORIDE AND ACETAMINOPHEN 1000 MG: 500 TABLET ORAL at 08:28

## 2021-04-30 RX ADMIN — SODIUM CHLORIDE 50 ML/HR: 0.9 INJECTION, SOLUTION INTRAVENOUS at 08:29

## 2021-04-30 RX ADMIN — INSULIN LISPRO 7 UNITS: 100 INJECTION, SOLUTION INTRAVENOUS; SUBCUTANEOUS at 12:20

## 2021-04-30 RX ADMIN — MELATONIN TAB 3 MG 3 MG: 3 TAB at 21:38

## 2021-04-30 RX ADMIN — HEPARIN SODIUM 5000 UNITS: 5000 INJECTION INTRAVENOUS; SUBCUTANEOUS at 21:38

## 2021-04-30 RX ADMIN — INSULIN LISPRO 7 UNITS: 100 INJECTION, SOLUTION INTRAVENOUS; SUBCUTANEOUS at 08:30

## 2021-04-30 RX ADMIN — INSULIN LISPRO 4 UNITS: 100 INJECTION, SOLUTION INTRAVENOUS; SUBCUTANEOUS at 12:20

## 2021-04-30 RX ADMIN — OXYCODONE HYDROCHLORIDE AND ACETAMINOPHEN 1000 MG: 500 TABLET ORAL at 21:38

## 2021-04-30 RX ADMIN — AMLODIPINE BESYLATE 5 MG: 5 TABLET ORAL at 08:28

## 2021-04-30 RX ADMIN — INSULIN GLARGINE 44 UNITS: 100 INJECTION, SOLUTION SUBCUTANEOUS at 21:38

## 2021-04-30 RX ADMIN — REMDESIVIR 100 MG: 5 INJECTION INTRAVENOUS at 09:56

## 2021-04-30 NOTE — PLAN OF CARE
Problem: PAIN - ADULT  Goal: Verbalizes/displays adequate comfort level or baseline comfort level  Description: Interventions:  - Encourage patient to monitor pain and request assistance  - Assess pain using appropriate pain scale  - Administer analgesics based on type and severity of pain and evaluate response  - Implement non-pharmacological measures as appropriate and evaluate response  - Consider cultural and social influences on pain and pain management  - Notify physician/advanced practitioner if interventions unsuccessful or patient reports new pain  Outcome: Progressing     Problem: INFECTION - ADULT  Goal: Absence or prevention of progression during hospitalization  Description: INTERVENTIONS:  - Assess and monitor for signs and symptoms of infection  - Monitor lab/diagnostic results  - Monitor all insertion sites, i e  indwelling lines, tubes, and drains  - Monitor endotracheal if appropriate and nasal secretions for changes in amount and color  - [place] appropriate cooling/warming therapies per order  - Administer medications as ordered  - Instruct and encourage patient and family to use good hand hygiene technique  - Identify and instruct in appropriate isolation precautions for identified infection/condition  Outcome: Progressing  Goal: Absence of fever/infection during neutropenic period  Description: INTERVENTIONS:  - Monitor WBC    Outcome: Progressing     Problem: SAFETY ADULT  Goal: Patient will remain free of falls  Description: INTERVENTIONS:  - Assess patient frequently for physical needs  -  Identify cognitive and physical deficits and behaviors that affect risk of falls    -  [place] fall precautions as indicated by assessment   - Educate patient/family on patient safety including physical limitations  - Instruct patient to call for assistance with activity based on assessment  - Modify environment to reduce risk of injury  - Consider OT/PT consult to assist with strengthening/mobility  Outcome: Progressing  Goal: Maintain or return to baseline ADL function  Description: INTERVENTIONS:  -  Assess patient's ability to carry out ADLs; assess patient's baseline for ADL function and identify physical deficits which impact ability to perform ADLs (bathing, care of mouth/teeth, toileting, grooming, dressing, etc )  - Assess/evaluate cause of self-care deficits   - Assess range of motion  - Assess patient's mobility; develop plan if impaired  - Assess patient's need for assistive devices and provide as appropriate  - Encourage maximum independence but intervene and supervise when necessary  - Involve family in performance of ADLs  - Assess for home care needs following discharge   - Consider OT consult to assist with ADL evaluation and planning for discharge  - Provide patient education as appropriate  Outcome: Progressing  Goal: Maintain or return mobility status to optimal level  Description: INTERVENTIONS:  - Assess patient's baseline mobility status (ambulation, transfers, stairs, etc )    - Identify cognitive and physical deficits and behaviors that affect mobility  - Identify mobility aids required to assist with transfers and/or ambulation (gait belt, sit-to-stand, lift, walker, cane, etc )  - [place] fall precautions as indicated by assessment  - Record patient progress and toleration of activity level on Mobility SBAR; progress patient to next Phase/Stage  - Instruct patient to call for assistance with activity based on assessment  - Consider rehabilitation consult to assist with strengthening/weightbearing, etc   Outcome: Progressing     Problem: DISCHARGE PLANNING  Goal: Discharge to home or other facility with appropriate resources  Description: INTERVENTIONS:  - Identify barriers to discharge w/patient and caregiver  - Arrange for needed discharge resources and transportation as appropriate  - Identify discharge learning needs (meds, wound care, etc )  - Arrange for interpretive services to assist at discharge as needed  - Refer to Case Management Department for coordinating discharge planning if the patient needs post-hospital services based on physician/advanced practitioner order or complex needs related to functional status, cognitive ability, or social support system  Outcome: Progressing     Problem: Knowledge Deficit  Goal: Patient/family/caregiver demonstrates understanding of disease process, treatment plan, medications, and discharge instructions  Description: Complete learning assessment and assess knowledge base  Interventions:  - Provide teaching at level of understanding  - Provide teaching via preferred learning methods  Outcome: Progressing     Problem: Potential for Falls  Goal: Patient will remain free of falls  Description: INTERVENTIONS:  - Assess patient frequently for physical needs  -  Identify cognitive and physical deficits and behaviors that affect risk of falls    -  [place] fall precautions as indicated by assessment   - Educate patient/family on patient safety including physical limitations  - Instruct patient to call for assistance with activity based on assessment  - Modify environment to reduce risk of injury  - Consider OT/PT consult to assist with strengthening/mobility  Outcome: Progressing     Problem: CARDIOVASCULAR - ADULT  Goal: Maintains optimal cardiac output and hemodynamic stability  Description: INTERVENTIONS:  - Monitor I/O, vital signs and rhythm  - Monitor for S/S and trends of decreased cardiac output  - Administer and titrate ordered vasoactive medications to optimize hemodynamic stability  - Assess quality of pulses, skin color and temperature  - Assess for signs of decreased coronary artery perfusion  - Instruct patient to report change in severity of symptoms  Outcome: Progressing  Goal: Absence of cardiac dysrhythmias or at baseline rhythm  Description: INTERVENTIONS:  - Continuous cardiac monitoring, vital signs, obtain 12 lead EKG if ordered  - Administer antiarrhythmic and heart rate control medications as ordered  - Monitor electrolytes and administer replacement therapy as ordered  Outcome: Progressing     Problem: RESPIRATORY - ADULT  Goal: Achieves optimal ventilation and oxygenation  Description: INTERVENTIONS:  - Assess for changes in respiratory status  - Assess for changes in mentation and behavior  - Position to facilitate oxygenation and minimize respiratory effort  - Oxygen administered by appropriate delivery if ordered  - Initiate smoking cessation education as indicated  - Encourage broncho-pulmonary hygiene including cough, deep breathe, Incentive Spirometry  - Assess the need for suctioning and aspirate as needed  - Assess and instruct to report SOB or any respiratory difficulty  - Respiratory Therapy support as indicated  Outcome: Progressing     Problem: METABOLIC, FLUID AND ELECTROLYTES - ADULT  Goal: Electrolytes maintained within normal limits  Description: INTERVENTIONS:  - Monitor labs and assess patient for signs and symptoms of electrolyte imbalances  - Administer electrolyte replacement as ordered  - Monitor response to electrolyte replacements, including repeat lab results as appropriate  - Instruct patient on fluid and nutrition as appropriate  Outcome: Progressing  Goal: Fluid balance maintained  Description: INTERVENTIONS:  - Monitor labs   - Monitor I/O and WT  - Instruct patient on fluid and nutrition as appropriate  - Assess for signs & symptoms of volume excess or deficit  Outcome: Progressing  Goal: Glucose maintained within target range  Description: INTERVENTIONS:  - Monitor Blood Glucose as ordered  - Assess for signs and symptoms of hyperglycemia and hypoglycemia  - Administer ordered medications to maintain glucose within target range  - Assess nutritional intake and initiate nutrition service referral as needed  Outcome: Progressing     Problem: SKIN/TISSUE INTEGRITY - ADULT  Goal: Skin integrity remains intact  Description: INTERVENTIONS  - Identify patients at risk for skin breakdown  - Assess and monitor skin integrity  - Assess and monitor nutrition and hydration status  - Monitor labs (i e  albumin)  - Assess for incontinence   - Turn and reposition patient  - Assist with mobility/ambulation  - Relieve pressure over bony prominences  - Avoid friction and shearing  - Provide appropriate hygiene as needed including keeping skin clean and dry  - Evaluate need for skin moisturizer/barrier cream  - Collaborate with interdisciplinary team (i e  Nutrition, Rehabilitation, etc )   - Patient/family teaching  Outcome: Progressing  Goal: Incision(s), wounds(s) or drain site(s) healing without S/S of infection  Description: INTERVENTIONS  - Assess and document risk factors for skin impairment   - Assess and document dressing, incision, wound bed, drain sites and surrounding tissue  - Consider nutrition services referral as needed  - Oral mucous membranes remain intact  - Provide patient/ family education  Outcome: Progressing  Goal: Oral mucous membranes remain intact  Description: INTERVENTIONS  - Assess oral mucosa and hygiene practices  - Implement preventative oral hygiene regimen  - Implement oral medicated treatments as ordered  - Initiate Nutrition services referral as needed  Outcome: Progressing     Problem: HEMATOLOGIC - ADULT  Goal: Maintains hematologic stability  Description: INTERVENTIONS  - Assess for signs and symptoms of bleeding or hemorrhage  - Monitor labs  - Administer supportive blood products/factors as ordered and appropriate  Outcome: Progressing     Problem: MUSCULOSKELETAL - ADULT  Goal: Maintain or return mobility to safest level of function  Description: INTERVENTIONS:  - Assess patient's ability to carry out ADLs; assess patient's baseline for ADL function and identify physical deficits which impact ability to perform ADLs (bathing, care of mouth/teeth, toileting, grooming, dressing, etc )  - Assess/evaluate cause of self-care deficits   - Assess range of motion  - Assess patient's mobility  - Assess patient's need for assistive devices and provide as appropriate  - Encourage maximum independence but intervene and supervise when necessary  - Involve family in performance of ADLs  - Assess for home care needs following discharge   - Consider OT consult to assist with ADL evaluation and planning for discharge  - Provide patient education as appropriate  Outcome: Progressing  Goal: Maintain proper alignment of affected body part  Description: INTERVENTIONS:  - Support, maintain and protect limb and body alignment  - Provide patient/ family with appropriate education  Outcome: Progressing     Problem: Nutrition/Hydration-ADULT  Goal: Nutrient/Hydration intake appropriate for improving, restoring or maintaining nutritional needs  Description: Monitor and assess patient's nutrition/hydration status for malnutrition  Collaborate with interdisciplinary team and initiate plan and interventions as ordered  Monitor patient's weight and dietary intake as ordered or per policy  Utilize nutrition screening tool and intervene as necessary  Determine patient's food preferences and provide high-protein, high-caloric foods as appropriate       INTERVENTIONS:  - Monitor oral intake, urinary output, labs, and treatment plans  - Assess nutrition and hydration status and recommend course of action  - Evaluate amount of meals eaten  - Assist patient with eating if necessary   - Allow adequate time for meals  - Recommend/ encourage appropriate diets, oral nutritional supplements, and vitamin/mineral supplements  - Order, calculate, and assess calorie counts as needed  - Recommend, monitor, and adjust tube feedings based on assessed needs  - Assess need for intravenous fluids  - Provide nutrition/hydration education as appropriate  - Include patient/family/caregiver in decisions related to nutrition  Outcome: Progressing

## 2021-04-30 NOTE — ASSESSMENT & PLAN NOTE
Lab Results   Component Value Date    HGBA1C 10 5 (H) 04/28/2021     Results from last 7 days   Lab Units 04/29/21  2056 04/29/21  1622 04/29/21  1124 04/29/21  0712 04/28/21  2126 04/28/21  1608 04/28/21  1116   POC GLUCOSE mg/dl 303* 396* 389* 171* 240* 214* 318*     · On Tresiba at home, 120 units a day, hold and start Lantus  · Continue sliding scale, CCO diet  · Hemoglobin A1c noted to be 10 5 which is up from 7 6 in 2019 which it was last checked  · ACHS BG monitoring  · Adjust as needed  · Of note; patient has had no recent follow up appts with family doctor

## 2021-04-30 NOTE — ASSESSMENT & PLAN NOTE
· Chronic  · Continue to hold lisinopril in setting of BELKIS, blood pressures are controlled, 120-130/70s    · Continue amlodipine as initiated on admission with holding parameters  · Labetalol p r n     /78   Pulse 68   Temp 98 4 °F (36 9 °C)   Resp 18   Ht 5' 9" (1 753 m)   Wt 112 kg (245 lb 13 oz)   SpO2 95%   BMI 36 30 kg/m²

## 2021-04-30 NOTE — ASSESSMENT & PLAN NOTE
· Incidental finding on admission, CXR showing ground-glass opacities  · Mild COVID-19 treatment pathway  · COVID-19 vitamins  · Heparin t i d   · Check inflammatory markers, trend  · Discussed Remdesivir with patient on 04/27 and initiated, today is day 4/5  · Patient is not hypoxic, no indication for steroids or O2 supplement  · Monitor oxygen saturation, supplement needed to maintain > 90%  · Encourage incentive spirometer, ambulation within the room, self prone    Results from last 7 days   Lab Units 04/30/21  0453 04/28/21  0935 04/26/21  1238   WBC Thousand/uL 8 28 5 11 6 08   FERRITIN ng/mL  --  550*  --    D-DIMER QUANTITATIVE ug/ml FEU 0 73* 0 90*  --    CRP mg/L 130 2* 88 2*  --

## 2021-04-30 NOTE — ASSESSMENT & PLAN NOTE
· Presented with substernal chest pain, does have risk factors including hypertension and type 2 diabetes  · Initial EKG showed T-wave inversions in inferior leads  · Troponin flatly elevated, suspect non-MI elevated secondary to COVID PNA   · Echo (7/14/02): Systolic function was normal  Ejection fraction was estimated to be 55 %  There were no regional wall motion abnormalities  Concentric hypertrophy was present  · Telemetry was unremarkable, discontinued  · P r n  Nitro  · Would likely benefit from stress test outpatient once cleared from COVID infection  · Suspect chest pain related to COVID infection   · Patient with no further chest pain

## 2021-04-30 NOTE — ASSESSMENT & PLAN NOTE
· Previous creatinine 2 years ago around 1 2  · Presented creatinine 1 7, unclear if BELKIS versus secondary to uncontrolled diabetes and hypertension  · Monitor BMP while hospitalized, daily   · Creatinine trended down to 1 52  Stable      Results from last 7 days   Lab Units 04/30/21  0453 04/28/21  0935 04/27/21  0524 04/26/21  1238   BUN mg/dL 20 20 21 19   CREATININE mg/dL 1 52* 1 64* 1 70* 1 77*   EGFR ml/min/1 73sq m 49 44 43 41

## 2021-04-30 NOTE — PROGRESS NOTES
[address] [facility]     Progress Note Kesha West 1960, 63 y o  male MRN: [id number]  Unit/Bed#: MS [facility] Encounter: [id number]  Primary Care Provider: No primary care provider on file  Date and time admitted to hospital: 4/26/2021 12:07 PM    * Chest pain  Assessment & Plan  · Presented with substernal chest pain, does have risk factors including hypertension and type 2 diabetes  · Initial EKG showed T-wave inversions in inferior leads  · Troponin flatly elevated, suspect non-MI elevated secondary to COVID PNA   · Echo (1/25/37): Systolic function was normal  Ejection fraction was estimated to be 55 %  There were no regional wall motion abnormalities  Concentric hypertrophy was present  · Telemetry was unremarkable, discontinued  · P r n  Nitro  · Would likely benefit from stress test outpatient once cleared from COVID infection  · Suspect chest pain related to COVID infection   · Patient with no further chest pain      Class 2 obesity due to excess calories without serious comorbidity with body mass index (BMI) of 36 0 to 36 9 in adult  Assessment & Plan  · Dietary and lifestyle modifications advised  · Independent risk factor for NZIOB-65 complications    NLTLG-83 virus infection  Assessment & Plan  · Incidental finding on admission, CXR showing ground-glass opacities  · Mild COVID-19 treatment pathway  · COVID-19 vitamins  · Heparin t i d   · Check inflammatory markers, trend  · Discussed Remdesivir with patient on 04/27 and initiated, today is day 4/5  · Patient is not hypoxic, no indication for steroids or O2 supplement  · Monitor oxygen saturation, supplement needed to maintain > 90%  · Encourage incentive spirometer, ambulation within the room, self prone    Results from last 7 days   Lab Units 04/30/21  0453 04/28/21  0935 04/26/21  1238   WBC Thousand/uL 8 28 5 11 6 08   FERRITIN ng/mL  --  550*  --    D-DIMER QUANTITATIVE ug/ml FEU 0 73* 0 90*  --    CRP mg/L 130 2* 88 2*  -- Elevated serum creatinine  Assessment & Plan  · Previous creatinine 2 years ago around 1 2  · Presented creatinine 1 7, unclear if BELKIS versus secondary to uncontrolled diabetes and hypertension  · Monitor BMP while hospitalized, daily   · Creatinine trended down to 1 52  Stable  Results from last 7 days   Lab Units 04/30/21  0453 04/28/21  0935 04/27/21  0524 04/26/21  1238   BUN mg/dL 20 20 21 19   CREATININE mg/dL 1 52* 1 64* 1 70* 1 77*   EGFR ml/min/1 73sq m 49 44 43 41       Essential hypertension  Assessment & Plan  · Chronic  · Continue to hold lisinopril in setting of BELKIS, blood pressures are controlled, 120-130/70s  · Continue amlodipine as initiated on admission with holding parameters  · Labetalol p r n     /78   Pulse 68   Temp 98 4 °F (36 9 °C)   Resp 18   Ht 5' 9" (1 753 m)   Wt 112 kg (245 lb 13 oz)   SpO2 95%   BMI 36 30 kg/m²       Type 2 diabetes mellitus with kidney complication, with long-term current use of insulin [facility])  Assessment & Plan    Lab Results   Component Value Date    HGBA1C 10 5 (H) 04/28/2021     Results from last 7 days   Lab Units 04/29/21  2056 04/29/21  1622 04/29/21  1124 04/29/21  0712 04/28/21  2126 04/28/21  1608 04/28/21  1116   POC GLUCOSE mg/dl 303* 396* 389* 171* 240* 214* 318*     · On Tresiba at home, 120 units a day, hold and start Lantus  · Continue sliding scale, CCO diet  · Hemoglobin A1c noted to be 10 5 which is up from 7 6 in 2019 which it was last checked  · ACHS BG monitoring  · Adjust as needed  · Of note; patient has had no recent follow up appts with family doctor  VTE Pharmacologic Prophylaxis:   Pharmacologic: Heparin  Mechanical VTE Prophylaxis in Place: Yes    Patient Centered Rounds: I have performed bedside rounds with nursing staff today  Discussions with Specialists or Other Care Team Provider: Case management    Education and Discussions with Family / Patient: Patient    Time Spent for Care: 20 minutes    More than 50% of total time spent on counseling and coordination of care as described above  Current Length of Stay: 3 day(s)    Current Patient Status: Inpatient   Certification Statement: The patient will continue to require additional inpatient hospital stay due to ongoing treatment in setting of covid-19 infection, day / Remdesivir treatment  Discharge Plan: Hopeful discharge to home in 24 hours  Code Status: Level 1 - Full Code      Subjective:   Patient resting on the edge of the bed  Reports feeling cold and tired and questioning why he is not feeling better, questioning if he can get Remdesivir three times today because he feels that "Bing must have had a tereso treatment because he was better immediately "      Reviewed protocol with patient, discussed that he is not requiring any supplemental oxygen which is a good thing and that the symptoms that he is experiencing are his body's response to the virus and are expected  Objective:     Vitals:   Temp (24hrs), Av 9 °F (36 6 °C), Min:97 4 °F (36 3 °C), Max:98 4 °F (36 9 °C)    Temp:  [97 4 °F (36 3 °C)-98 4 °F (36 9 °C)] 97 4 °F (36 3 °C)  HR:  [64-77] 77  Resp:  [18] 18  BP: (126-137)/(71-80) 137/71  SpO2:  [88 %-95 %] 95 %  Body mass index is 36 3 kg/m²  Input and Output Summary (last 24 hours): Intake/Output Summary (Last 24 hours) at 2021 1101  Last data filed at 2021 0829  Gross per 24 hour   Intake 1960 ml   Output 575 ml   Net 1385 ml       Physical Exam:     Physical Exam  Vitals signs and nursing note reviewed  Constitutional:       Appearance: He is obese  He is not ill-appearing  Cardiovascular:      Rate and Rhythm: Normal rate  Pulses: Normal pulses  Heart sounds: Normal heart sounds  Pulmonary:      Effort: Pulmonary effort is normal       Breath sounds: Normal breath sounds  Abdominal:      General: Bowel sounds are normal       Palpations: Abdomen is soft  Musculoskeletal: Normal range of motion  Skin:     General: Skin is warm and dry  Capillary Refill: Capillary refill takes less than 2 seconds  Neurological:      Mental Status: He is alert  Mental status is at baseline  Psychiatric:         Mood and Affect: Mood normal        Additional Data:     Labs:    Results from last 7 days   Lab Units 04/30/21  0453 04/28/21  0935   WBC Thousand/uL 8 28 5 11   HEMOGLOBIN g/dL 13 9 14 1   HEMATOCRIT % 40 6 40 7   PLATELETS Thousands/uL 231 174   NEUTROS PCT %  --  75   LYMPHS PCT %  --  18   MONOS PCT %  --  7   EOS PCT %  --  0     Results from last 7 days   Lab Units 04/30/21  0453   SODIUM mmol/L 134*   POTASSIUM mmol/L 4 3   CHLORIDE mmol/L 103   CO2 mmol/L 19*   BUN mg/dL 20   CREATININE mg/dL 1 52*   ANION GAP mmol/L 12   CALCIUM mg/dL 8 0*   ALBUMIN g/dL 2 3*   TOTAL BILIRUBIN mg/dL 0 65   ALK PHOS U/L 72   ALT U/L 28   AST U/L 33   GLUCOSE RANDOM mg/dL 259*         Results from last 7 days   Lab Units 04/30/21  0719 04/29/21  2056 04/29/21  1622 04/29/21  1124 04/29/21  0712 04/28/21  2126 04/28/21  1608 04/28/21  1116 04/28/21  0814 04/27/21  2104 04/27/21  1527 04/27/21  1106   POC GLUCOSE mg/dl 251* 303* 396* 389* 171* 240* 214* 318* 161* 212* 184* 283*     Results from last 7 days   Lab Units 04/28/21  0935   HEMOGLOBIN A1C % 10 5*               * I Have Reviewed All Lab Data Listed Above  * Additional Pertinent Lab Tests Reviewed:  All Labs Within Last 24 Hours Reviewed    Imaging:    Imaging Reports Reviewed Today Include: Echocardiogram  Imaging Personally Reviewed by Myself Includes:  None    Recent Cultures (last 7 days):           Last 24 Hours Medication List:   Current Facility-Administered Medications   Medication Dose Route Frequency Provider Last Rate    acetaminophen  650 mg Oral Q6H PRN Javad Jaimes MD      amLODIPine  5 mg Oral Daily Javad Jaimes MD      ascorbic acid  1,000 mg Oral Q12H [facility] Christina Stephenson PA-C      benzonatate  100 mg Oral TID PRN ALINA Meza  cholecalciferol  2,000 Units Oral Daily Uzma Matthews PA-C      heparin (porcine)  5,000 Units Subcutaneous Q8H [address] Javad Jaimes MD      hydrALAZINE  5 mg Intravenous Q6H PRN Javad Jaimes MD      insulin glargine  44 Units Subcutaneous HS ALINA Richter      insulin lispro  1-5 Units Subcutaneous HS Javad Jaimes MD      insulin lispro  2-12 Units Subcutaneous TID AC LAINA Parrish      insulin lispro  7 Units Subcutaneous TID With Meals ALINA Richter      melatonin  3 mg Oral HS Angelica Gray PA-C      zinc sulfate  220 mg Oral Daily Uzma Matthews PA-C      Followed by   Kareem Cooper ON 5/4/2021] multivitamin-minerals  1 tablet Oral Daily Uzma Matthews PA-C      nitroglycerin  0 4 mg Sublingual Q5 Min PRN Esperanza Mcdermott MD      oxyCODONE-acetaminophen  1 tablet Oral Q6H PRN ALINA Richter      remdesivir  100 mg Intravenous Q24H Uzma Matthews PA-C      sodium chloride (PF)  3 mL Intravenous Q1H PRN ALINA Hernandes          Today, Patient Was Seen By: ALINA Richter    ** Please Note: Dictation voice to text software may have been used in the creation of this document   **

## 2021-05-01 VITALS
DIASTOLIC BLOOD PRESSURE: 79 MMHG | HEIGHT: 69 IN | HEART RATE: 58 BPM | BODY MASS INDEX: 36.41 KG/M2 | SYSTOLIC BLOOD PRESSURE: 126 MMHG | RESPIRATION RATE: 18 BRPM | WEIGHT: 245.81 LBS | TEMPERATURE: 97.8 F | OXYGEN SATURATION: 92 %

## 2021-05-01 LAB
ANION GAP SERPL CALCULATED.3IONS-SCNC: 16 MMOL/L (ref 4–13)
BUN SERPL-MCNC: 27 MG/DL (ref 5–25)
CALCIUM SERPL-MCNC: 8.4 MG/DL (ref 8.3–10.1)
CHLORIDE SERPL-SCNC: 104 MMOL/L (ref 100–108)
CO2 SERPL-SCNC: 17 MMOL/L (ref 21–32)
CREAT SERPL-MCNC: 1.49 MG/DL (ref 0.6–1.3)
ERYTHROCYTE [DISTWIDTH] IN BLOOD BY AUTOMATED COUNT: 12.1 % (ref 11.6–15.1)
GFR SERPL CREATININE-BSD FRML MDRD: 50 ML/MIN/1.73SQ M
GLUCOSE SERPL-MCNC: 332 MG/DL (ref 65–140)
GLUCOSE SERPL-MCNC: 332 MG/DL (ref 65–140)
GLUCOSE SERPL-MCNC: 358 MG/DL (ref 65–140)
HCT VFR BLD AUTO: 43.2 % (ref 36.5–49.3)
HGB BLD-MCNC: 15 G/DL (ref 12–17)
MCH RBC QN AUTO: 28.5 PG (ref 26.8–34.3)
MCHC RBC AUTO-ENTMCNC: 34.7 G/DL (ref 31.4–37.4)
MCV RBC AUTO: 82 FL (ref 82–98)
PLATELET # BLD AUTO: 286 THOUSANDS/UL (ref 149–390)
PMV BLD AUTO: 10.6 FL (ref 8.9–12.7)
POTASSIUM SERPL-SCNC: 4.4 MMOL/L (ref 3.5–5.3)
RBC # BLD AUTO: 5.27 MILLION/UL (ref 3.88–5.62)
SODIUM SERPL-SCNC: 137 MMOL/L (ref 136–145)
WBC # BLD AUTO: 10.64 THOUSAND/UL (ref 4.31–10.16)

## 2021-05-01 PROCEDURE — 85027 COMPLETE CBC AUTOMATED: CPT | Performed by: NURSE PRACTITIONER

## 2021-05-01 PROCEDURE — 80048 BASIC METABOLIC PNL TOTAL CA: CPT | Performed by: NURSE PRACTITIONER

## 2021-05-01 PROCEDURE — 82948 REAGENT STRIP/BLOOD GLUCOSE: CPT

## 2021-05-01 PROCEDURE — 99239 HOSP IP/OBS DSCHRG MGMT >30: CPT | Performed by: NURSE PRACTITIONER

## 2021-05-01 RX ORDER — BLOOD-GLUCOSE METER
KIT MISCELLANEOUS
Qty: 1 EACH | Refills: 0 | Status: SHIPPED | OUTPATIENT
Start: 2021-05-01

## 2021-05-01 RX ORDER — BLOOD SUGAR DIAGNOSTIC
STRIP MISCELLANEOUS
Qty: 200 EACH | Refills: 0 | Status: SHIPPED | OUTPATIENT
Start: 2021-05-01

## 2021-05-01 RX ORDER — MELATONIN
2000 DAILY
Qty: 60 TABLET | Refills: 0 | Status: SHIPPED | OUTPATIENT
Start: 2021-05-01 | End: 2021-05-31

## 2021-05-01 RX ORDER — LANCETS 28 GAUGE
EACH MISCELLANEOUS
Qty: 200 EACH | Refills: 0 | Status: SHIPPED | OUTPATIENT
Start: 2021-05-01

## 2021-05-01 RX ORDER — BENZONATATE 100 MG/1
100 CAPSULE ORAL 3 TIMES DAILY PRN
Qty: 20 CAPSULE | Refills: 0 | Status: SHIPPED | OUTPATIENT
Start: 2021-05-01

## 2021-05-01 RX ORDER — AMLODIPINE BESYLATE 5 MG/1
5 TABLET ORAL DAILY
Qty: 30 TABLET | Refills: 0 | Status: SHIPPED | OUTPATIENT
Start: 2021-05-01 | End: 2021-05-31

## 2021-05-01 RX ORDER — GLUCOSAMINE HCL/CHONDROITIN SU 500-400 MG
CAPSULE ORAL
Qty: 200 EACH | Refills: 0 | Status: SHIPPED | OUTPATIENT
Start: 2021-05-01

## 2021-05-01 RX ADMIN — INSULIN LISPRO 8 UNITS: 100 INJECTION, SOLUTION INTRAVENOUS; SUBCUTANEOUS at 12:05

## 2021-05-01 RX ADMIN — ZINC SULFATE 220 MG (50 MG) CAPSULE 220 MG: CAPSULE at 08:28

## 2021-05-01 RX ADMIN — INSULIN LISPRO 7 UNITS: 100 INJECTION, SOLUTION INTRAVENOUS; SUBCUTANEOUS at 08:29

## 2021-05-01 RX ADMIN — HEPARIN SODIUM 5000 UNITS: 5000 INJECTION INTRAVENOUS; SUBCUTANEOUS at 05:55

## 2021-05-01 RX ADMIN — OXYCODONE HYDROCHLORIDE AND ACETAMINOPHEN 1000 MG: 500 TABLET ORAL at 08:28

## 2021-05-01 RX ADMIN — INSULIN LISPRO 3 UNITS: 100 INJECTION, SOLUTION INTRAVENOUS; SUBCUTANEOUS at 08:28

## 2021-05-01 RX ADMIN — AMLODIPINE BESYLATE 5 MG: 5 TABLET ORAL at 08:28

## 2021-05-01 RX ADMIN — REMDESIVIR 100 MG: 5 INJECTION INTRAVENOUS at 10:07

## 2021-05-01 RX ADMIN — INSULIN LISPRO 7 UNITS: 100 INJECTION, SOLUTION INTRAVENOUS; SUBCUTANEOUS at 12:05

## 2021-05-01 RX ADMIN — Medication 2000 UNITS: at 08:28

## 2021-05-01 NOTE — ASSESSMENT & PLAN NOTE
· Chronic  · Continue to hold lisinopril in setting of BELKIS on discharge, blood pressures are controlled, 120-130/70s    · Continue amlodipine as initiated on admission, continue on discharge  ·   /70   Pulse 72   Temp 98 °F (36 7 °C)   Resp 18   Ht 5' 9" (1 753 m)   Wt 112 kg (245 lb 13 oz)   SpO2 92%   BMI 36 30 kg/m²

## 2021-05-01 NOTE — ASSESSMENT & PLAN NOTE
· Presented with substernal chest pain, does have risk factors including hypertension and type 2 diabetes  · Initial EKG showed T-wave inversions in inferior leads  · Troponin flatly elevated, suspect non-MI elevated secondary to COVID PNA   · Echo (6/29/33): Systolic function was normal  Ejection fraction was estimated to be 55 %  There were no regional wall motion abnormalities  Concentric hypertrophy was present  · Telemetry was unremarkable, discontinued  · Would likely benefit from stress test outpatient once cleared from COVID infection  · Suspect chest pain related to COVID infection   · Patient with no further chest pain

## 2021-05-01 NOTE — ASSESSMENT & PLAN NOTE
Lab Results   Component Value Date    HGBA1C 10 5 (H) 04/28/2021     Results from last 7 days   Lab Units 04/30/21  2114 04/30/21  1615 04/30/21  1139 04/30/21  0719 04/29/21 2056 04/29/21  1622 04/29/21  1124   POC GLUCOSE mg/dl 386* 365* 234* 251* 303* 396* 389*     · On Tresiba at home, 120 units a day, restart Lolita Billingsley at home  · Continue CCO diet  · Hemoglobin A1c noted to be 10 5 which is up from 7 6 in 2019 which it was last checked  · Continue ACHS BG monitoring at home; provided with appropriate supplies  · Of note; patient has had no recent follow up appts with family doctor  · Endocrinology referral outpatient  · Patient reports needing blood glucose kit  · Had a discussion with patient regarding getting better control of his blood glucose levels including checking his sugars and following up with Endocrinology  Discussed the risks of uncontrolled diabetes on his kidneys

## 2021-05-01 NOTE — DISCHARGE INSTR - AVS FIRST PAGE
Follow up with PCP within 1 week  Follow up with BMP to check your kidney function  Follow up with Endocrinology ( Diabetes doctor)

## 2021-05-01 NOTE — ASSESSMENT & PLAN NOTE
· Incidental finding on admission, CXR showing ground-glass opacities  · Mild COVID-19 treatment pathway  · COVID-19 vitamins  · Discussed Remdesivir with patient on 04/27 and initiated, today is day 5/5  · Patient is not hypoxic, no indication for steroids or O2 supplement  · Continue incentive spirometer, ambulation, self prone at home      Results from last 7 days   Lab Units 05/01/21  0558 04/30/21  0453 04/28/21  0935 04/26/21  1238   WBC Thousand/uL 10 64* 8 28 5 11 6 08   FERRITIN ng/mL  --  493* 550*  --    D-DIMER QUANTITATIVE ug/ml FEU  --  0 73* 0 90*  --    CRP mg/L  --  130 2* 88 2*  --

## 2021-05-01 NOTE — DISCHARGE INSTRUCTIONS
Taken directly from the [facility] website:  [email or web address]    WHO NEEDS TO QUARANTINE? People who have been in close contact with someone who has COVID-19--excluding people who have had COVID-19 within the past 3 months or who are fully vaccinated  · People who have tested positive for COVID-19 within the past 3 months and recovered do not have to quarantine or get tested again as long as they do not develop new symptoms  · People who develop symptoms again within 3 months of their first bout of COVID-19 may need to be tested again if there is no other cause identified for their symptoms  · People who have been in close contact with someone who has COVID-19 are not required to quarantine if they have been fully vaccinated against the disease and show no symptoms  What counts as close contact? · You were within 6 feet of someone who has COVID-19 for a total of 15 minutes or more  · You provided care at home to someone who is sick with COVID-19  · You had direct physical contact with the person (hugged or kissed them)  · You shared eating or drinking utensils  · They sneezed, coughed, or somehow got respiratory droplets on you    STEPS TO TAKE  Stay home and monitor your health  · Stay home for 14 days after your last contact with a person who has COVID-19  · Watch for fever (100  4? F), cough, shortness of breath, or other symptoms of COVID-19  · If possible, stay away from others, especially people who are at higher risk for getting very sick from COVID-19  Options to reduce quarantine  Reducing the length of quarantine may make it easier for people to quarantine by reducing the time they cannot work  A shorter quarantine period also can lessen stress on the public health system, especially when new infections are rapidly rising    Your local public health authorities make the final decisions about how long quarantine should last, based on local conditions and needs  Follow the recommendations of your local public health department if you need to quarantine  Options they will consider include stopping quarantine  · After day 10 without testing  · After day 7 after receiving a negative test result (test must occur on day 5 or later)  After stopping quarantine, you should  · Watch for symptoms until 14 days after exposure  · If you have symptoms, immediately self-isolate and contact your local public health authority or healthcare provider  · Wear a mask, stay at least 6 feet from others, wash your hands, avoid crowds, and take other steps to prevent the spread of COVID-19  CDC continues to endorse quarantine for 14 days and recognizes that any quarantine shorter than 14 days balances reduced burden against a small possibility of spreading the virus  CDC will continue to evaluate new information and update recommendations as needed  See Options to Reduce Quarantine for Contacts of Persons with SARS-CoV-2 Infection Using Symptom Monitoring and Diagnostic Testing for guidance on options to reduce quarantine  Confirmed and suspected cases of reinfection of the virus that causes COVID-19  Cases of reinfection of COVID-19 have been reported but are rare  In general, reinfection means a person was infected (got sick) once, recovered, and then later became infected again  Based on what we know from similar viruses, some reinfections are expected  Diabetes and Nutrition   WHAT YOU NEED TO KNOW:   Nutrition plans help with healthy eating patterns that improve health  Nutrition plans and regular exercise help keep your blood sugar levels steady  They also help delay or prevent complications of diabetes, such as diabetic kidney disease  DISCHARGE INSTRUCTIONS:   Call your local emergency number (911 in the [address] [place] [address],[address]) if:   · You have any of the following signs of a heart attack:      ?  Squeezing, pressure, or pain in your chest    ? You may  also have any of the following: § Discomfort or pain in your back, neck, jaw, stomach, or arm    § Shortness of breath    § Nausea or vomiting    § Lightheadedness or a sudden cold sweat      Seek care immediately if:   · You have a low blood sugar level and it does not improve with treatment  Symptoms are trouble thinking, a pounding heartbeat, and sweating  · Your blood sugar level is above 240 mg/dL and does not come down within 15 minutes of treatment  · You have ketones in your blood or urine  · You have nausea or are vomiting and cannot keep any food or liquid down  · You have blurred or double vision  · Your breath has a fruity, sweet smell, or your breathing is shallow  Call your doctor or diabetes care team if:   · Your blood sugar levels are higher than your target goals  · You often have low blood sugar levels  · You have trouble coping with diabetes, or you feel anxious or depressed  · You have questions or concerns about your condition or care  A dietitian will help you create a nutrition plan  to meet your needs and your family's needs  The goal is for you to reach or maintain healthy weight, blood sugar, blood pressure, and lipid levels  You should meet with the dietitian at least 1 time each year  You will learn the following:  · How food affects your blood sugar levels    · How to create healthy eating habits    · How to make food choices based on your activity level, weight, and glucose levels    · How your favorite foods may fit into your plan    · How to keep track of carbohydrates    · Correct portion sizes for each food    · Changes you can make to your plan if you get pregnant or are breastfeeding    Do not skip meals: The goal is to keep your blood sugar level steady  Blood sugar levels may drop too low if you have received insulin and do not eat  Eat more high-fiber foods:  High-fiber foods include fresh or frozen fruits and vegetables, whole-grain breads, and beans   Fiber helps control or lower blood sugar and cholesterol levels  Choose whole fruits instead of fruit juice as much as possible  Sugar may be added to juice, and fiber may be removed  Choose heart-healthy fats:  Foods high in heart-healthy fats include olive oil, nuts, avocados, and fatty fish, such as salmon and tuna  Foods high in unhealthy fats include red meat, full-fat dairy products, and soft margarine  Unhealthy fats can increase your risk for heart disease, increase bad cholesterol, and lower good cholesterol  Choose complex carbohydrates:  Foods with complex carbohydrates include brown rice, whole-grain breads and cereals, and cooked beans  Foods with simple carbohydrates include white bread, white rice, most cold cereals, and snack foods  Your plan will include the amount of carbohydrate to have at one time or in a day  Your blood sugar level can get too high if you eat too much carbohydrate at one time  Blood sugar levels do not spike as high or drop as quickly with complex carbohydrates as with simple carbohydrates  Choose complex carbohydrates whenever possible  Have less sodium (salt): The risk for high blood pressure (BP) increases with high-sodium foods  Limit high-sodium foods, such as soy sauce, potato chips, and canned soup  Do not add salt to food you cook  Limit your use of table salt  Read labels to have no more than 2,300 milligrams of sodium in one day  Limit artificial sweeteners: These may be found in food or drinks, such as diet soft drinks or other low-calorie beverages  Artificial sweeteners are low in calories  They may help you lower your overall calories and carbohydrates  It is important not to have more calories from other foods to make up for the calories saved  Artificial sweeteners do not have any nutrition  Eat whole foods and drink water as much as possible  Your plan may include beverages with artificial sweeteners for a short time   These can help you transition from high-sugar beverages to water  Use the plate method for each meal:  This method can help you eat the right amount of carbohydrates and keep your blood sugar levels under control  · Draw an imaginary line down the middle of a 9-inch dinner plate  On one side, draw another line to divide that section in half  Your plate will have one large section and 2 small sections  · Fill the largest section with non-starchy vegetables  These include broccoli, spinach, cucumbers, peppers, cauliflower, and tomatoes  · Add a starch to one of the small sections  Starches include pasta, rice, whole-grain bread, tortillas, corn, potatoes, and beans  · Add meat or another source of protein to the other small section  Examples include chicken or turkey without skin, fish, lean beef or pork, low-fat cheese, tofu, and eggs  · Add dairy products or fruit next to your plate if your meal plan allows  Examples of dairy include skim or 1% milk and low-fat yogurt  If you do not drink milk or eat dairy products, you may be able to add another serving of starchy food instead  · Have a low-calorie or calorie-free drink with your meal  Examples include water or unsweetened tea or coffee  Know the risks if you choose to drink alcohol:  Alcohol can cause hypoglycemia (low blood sugar level), especially if you use insulin  Alcohol can cause high blood sugar and BP levels, and weight gain if you drink too much  Women 21 years or older and men 72 years or older should limit alcohol to 1 drink a day  Men aged 24 to 59 years should limit alcohol to 2 drinks a day  A drink of alcohol is 12 ounces of beer, 5 ounces of wine, or 1½ ounces of liquor  Hypoglycemia can happen hours after you drink alcohol  Check your blood sugar level for several hours after you drink alcohol  Have a source of fast-acting carbohydrates with you in case your level goes too low   You need immediate care if you have signs or symptoms of hypoglycemia, such as sweating, confusion, or fainting  Maintain a healthy weight:  A healthy weight can help you control your diabetes  You can maintain a healthy weight with a nutrition plan and exercise  Ask your healthcare provider how much you should weigh  Ask him or her to help you create a weight loss plan if you are overweight  Together you can set weight loss and maintenance goals  Follow up with your doctor or diabetes team as directed:  Write down your questions so you remember to ask them during your visits  © Copyright [address] Information is for End User's use only and may not be sold, redistributed or otherwise used for commercial purposes  All illustrations and images included in CareNotes® are the copyrighted property of A D A M , Inc  or [address] [place], [place] [address]  The above information is an  only  It is not intended as medical advice for individual conditions or treatments  Talk to your doctor, nurse or pharmacist before following any medical regimen to see if it is safe and effective for you  How to Check Your Blood Sugar   WHAT YOU NEED TO KNOW:   High blood sugar levels increase your risk for heart attack, stroke, eye problems, and kidney problems  You can decrease your risk by controlling your blood sugar levels  Low blood sugar levels can also lead to serious health problems and must be treated right away  Check your blood sugar to help you learn how food, exercise, stress, and medicines affect your levels  Keep a record of your blood sugar levels  It can be used to adjust your meal plan, exercise routine, insulin doses, or diabetes medicine if needed  DISCHARGE INSTRUCTIONS:   How to check your blood sugar level:   · Check your blood sugar level with a glucose meter  This device uses a small drop of blood to measure your blood sugar level  Some glucose meters measure a drop of blood taken from your finger using a special lancet device   Other meters will also measure a drop of blood taken from your thigh, forearm, or the palm of your hand  · Blood sugar levels change quickly after meals, after you take insulin, during exercise, and when you feel stressed or ill  It is best to use blood from your finger to check your blood sugar level during these times  Your healthcare provider will teach you how to use a glucose meter to check your blood sugar level  Ask your healthcare provider for more information about taking blood samples from areas other than your finger  When and how often to check your blood sugar level:  Ask your healthcare provider when and how often you should check your blood sugar levels  If you check your blood sugar level before a meal , it should be between 80 and 130 mg/dL  If you check your blood sugar level 2 hours after a meal , it should be less than 180 mg/dL  Ask your healthcare provider if these are good goals for you  Blood sugar levels need to be checked more often when you are sick, there is a change to your medicine, or if you change your daily routine  Test your blood sugar level if you feel like it may be too high (hyperglycemia) or too low (hypoglycemia)  Keep a record of your blood sugar levels:  Write down your blood sugar level each time you test it  Write down the date, the time of the test (including if it was before or after a meal), and the result  Write down the time you took your insulin or diabetes pills  Record the type and amount of insulin or diabetes medicine you took  Write comments about anything that may have made your blood sugar level go up or down  Your blood sugar level can be affected by exercise, eating more or less than usual, or stress  Bring this record with you to your follow-up visits  How to care for your glucose meter and test strips:  Ask your healthcare provider how and how often to check the accuracy of your meter  You may need to do the following:  · Store your equipment properly    Keep the test strips away from heat, cold, and moisture  Do not take a test strip out of the container until you are ready to use it  Put the lid back tightly on the container  Do not use test strips that are damaged, wet, or bent  · Check the expiration date  on the test strip container to be sure the test strips have not   Your blood sugar readings may be wrong if you use  test strips  Use only the type of glucose test strips that work with your glucose meter  Wear medical alert identification:  Wear medical alert jewelry or carry a card that says you have diabetes  Ask your healthcare provider where to get these items  Follow up with your healthcare provider as directed:  Write down your questions so you remember to ask them during your visits  ©  [facility] Information is for End User's use only and may not be sold, redistributed or otherwise used for commercial purposes  All illustrations and images included in CareNotes® are the copyrighted property of A D A M , Inc  or Jorge Carson  The above information is an  only  It is not intended as medical advice for individual conditions or treatments  Talk to your doctor, nurse or pharmacist before following any medical regimen to see if it is safe and effective for you

## 2021-05-01 NOTE — ASSESSMENT & PLAN NOTE
· Previous creatinine 2 years ago around 1 2  · Presented creatinine 1 7, unclear if BELKIS versus secondary to uncontrolled diabetes and hypertension  · Creatinine trended down to 1 52  Stable      Results from last 7 days   Lab Units 05/01/21  0558 04/30/21  0453 04/28/21  0935 04/27/21  0524 04/26/21  1238   BUN mg/dL 27* 20 20 21 19   CREATININE mg/dL 1 49* 1 52* 1 64* 1 70* 1 77*   EGFR ml/min/1 73sq m 50 49 44 43 41

## 2021-05-01 NOTE — DISCHARGE SUMMARY
[facility]     Discharge- Lawrence Gupta 1960, 63 y o  male MRN: [id number]  Unit/Bed#: MS [facility] Encounter: [id number]  Primary Care Provider: No primary care provider on file  Date and time admitted to hospital: 4/26/2021 12:07 PM    * Chest pain  Assessment & Plan  · Presented with substernal chest pain, does have risk factors including hypertension and type 2 diabetes  · Initial EKG showed T-wave inversions in inferior leads  · Troponin flatly elevated, suspect non-MI elevated secondary to COVID PNA   · Echo (6/36/86): Systolic function was normal  Ejection fraction was estimated to be 55 %  There were no regional wall motion abnormalities  Concentric hypertrophy was present  · Telemetry was unremarkable, discontinued  · Would likely benefit from stress test outpatient once cleared from COVID infection  · Suspect chest pain related to COVID infection   · Patient with no further chest pain  Class 2 obesity due to excess calories without serious comorbidity with body mass index (BMI) of 36 0 to 36 9 in adult  Assessment & Plan  · Dietary and lifestyle modifications advised  · Independent risk factor for HUCXA-10 complications    USZKH-31 virus infection  Assessment & Plan  · Incidental finding on admission, CXR showing ground-glass opacities  · Mild COVID-19 treatment pathway  · COVID-19 vitamins  · Discussed Remdesivir with patient on 04/27 and initiated, today is day 5/5  · Patient is not hypoxic, no indication for steroids or O2 supplement  · Continue incentive spirometer, ambulation, self prone at home      Results from last 7 days   Lab Units 05/01/21  0558 04/30/21  0453 04/28/21  0935 04/26/21  1238   WBC Thousand/uL 10 64* 8 28 5 11 6 08   FERRITIN ng/mL  --  493* 550*  --    D-DIMER QUANTITATIVE ug/ml FEU  --  0 73* 0 90*  --    CRP mg/L  --  130 2* 88 2*  --        Elevated serum creatinine  Assessment & Plan  · Previous creatinine 2 years ago around 1 2  · Presented creatinine 1 7, unclear if BELKIS versus secondary to uncontrolled diabetes and hypertension  · Creatinine trended down to 1 52  Stable  Results from last 7 days   Lab Units 05/01/21  0558 04/30/21  0453 04/28/21  0935 04/27/21  0524 04/26/21  1238   BUN mg/dL 27* 20 20 21 19   CREATININE mg/dL 1 49* 1 52* 1 64* 1 70* 1 77*   EGFR ml/min/1 73sq m 50 49 44 43 41       Essential hypertension  Assessment & Plan  · Chronic  · Continue to hold lisinopril in setting of BELKIS on discharge, blood pressures are controlled, 120-130/70s  · Continue amlodipine as initiated on admission, continue on discharge  ·   /70   Pulse 72   Temp 98 °F (36 7 °C)   Resp 18   Ht 5' 9" (1 753 m)   Wt 112 kg (245 lb 13 oz)   SpO2 92%   BMI 36 30 kg/m²       Type 2 diabetes mellitus with kidney complication, with long-term current use of insulin [facility])  Assessment & Plan    Lab Results   Component Value Date    HGBA1C 10 5 (H) 04/28/2021     Results from last 7 days   Lab Units 04/30/21  2114 04/30/21  1615 04/30/21  1139 04/30/21  0719 04/29/21  2056 04/29/21  1622 04/29/21  1124   POC GLUCOSE mg/dl 386* 365* 234* 251* 303* 396* 389*     · On Tresiba at home, 120 units a day, restart [place] at home  · Continue CCO diet  · Hemoglobin A1c noted to be 10 5 which is up from 7 6 in 2019 which it was last checked  · Continue ACHS BG monitoring at home; provided with appropriate supplies  · Of note; patient has had no recent follow up appts with family doctor  · Endocrinology referral outpatient  · Patient reports needing blood glucose kit  · Had a discussion with patient regarding getting better control of his blood glucose levels including checking his sugars and following up with Endocrinology  Discussed the risks of uncontrolled diabetes on his kidneys  Discharging Physician / Practitioner: ALINA Salgado  PCP: No primary care provider on file    Admission Date:   Admission Orders (From admission, onward) Ordered        04/27/21 1506  Inpatient Admission  Once         04/26/21 1355  Place in Observation  Once                   Discharge Date: 05/01/21    Resolved Problems  Date Reviewed: 4/30/2021    None          Consultations During Hospital Stay:  · None    Procedures Performed:   · None    Significant Findings / Test Results:   X-ray chest 1 view portable   Final Result by Concetta Nunez MD (04/26 1312)      Patchy peripheral pulmonary infiltrates which may indicate Covid pneumonia  Workstation performed: [id number]             Incidental Findings:   · None    Test Results Pending at Discharge (will require follow up): · None     Outpatient Tests Requested:  · Follow up with PCP within 1 week  · Follow up with BMP within 1 week  · Follow up with Endocrinology    Complications:  None    Reason for Admission: Covid-19 infection    Hospital Course:     Tanner Eng is a 63 y o  male patient who originally presented to the hospital on 4/26/2021 due to chest pain  Patient with past medical history of diabetes type 2 on Tresiba, Hypertension  Patient found to be covid-19 positive on 4/26, he was started on the mild pathway and given a full 5 day course of Remdesivir with improvement  Fortunately, patient did not require any supplemental oxygen during his stay  Patient was given IVF for elevated creatinine, dehydration with improvement of his creatinine  Patient instructed to follow up with PCP and endocrinology on discharge  Please see above list of diagnoses and related plan for additional information  Condition at Discharge: stable     Discharge Day Visit / Exam:     Subjective:  Patient resting in bed at this time  Denies complaints of chest pain, shortness of breath, fever, or chills  He states he feels much better than yesterday, coughing less and can now lay flat      Vitals: Blood Pressure: 126/79 (05/01/21 0758)  Pulse: 58 (05/01/21 0758)  Temperature: 97 8 °F (36 6 °C) (05/01/21 0758)  Temp Source: Oral (04/30/21 4130)  Respirations: 18 (04/30/21 2116)  Height: 5' 9" (175 3 cm) (04/26/21 2100)  Weight - Scale: 112 kg (245 lb 13 oz) (04/26/21 2100)  SpO2: 92 % (05/01/21 0758)     Exam:   Physical Exam  Vitals signs and nursing note reviewed  Constitutional:       Appearance: He is obese  He is not ill-appearing  Cardiovascular:      Rate and Rhythm: Normal rate  Pulses: Normal pulses  Heart sounds: Normal heart sounds  Pulmonary:      Effort: Pulmonary effort is normal       Breath sounds: Normal breath sounds  Abdominal:      General: Bowel sounds are normal       Palpations: Abdomen is soft  Musculoskeletal: Normal range of motion  Skin:     General: Skin is warm and dry  Capillary Refill: Capillary refill takes less than 2 seconds  Neurological:      Mental Status: He is alert  Mental status is at baseline  Psychiatric:         Mood and Affect: Mood normal        Discussion with Family: call placed to patients mother, cj  Discharge instructions/Information to patient and family:   See after visit summary for information provided to patient and family  Provisions for Follow-Up Care:  See after visit summary for information related to follow-up care and any pertinent home health orders  Disposition:     Home    Planned Readmission: No     Discharge Statement:  I spent 35 minutes discharging the patient  This time was spent on the day of discharge  I had direct contact with the patient on the day of discharge  Greater than 50% of the total time was spent examining patient, answering all patient questions, arranging and discussing plan of care with patient as well as directly providing post-discharge instructions  Additional time then spent on discharge activities  Discharge Medications:  See after visit summary for reconciled discharge medications provided to patient and family        ** Please Note: This note has been constructed using a voice recognition system **

## 2021-05-28 NOTE — UTILIZATION REVIEW
URGENT/EMERGENT  INPATIENT/SPU AUTHORIZATION REQUEST    Date: 05/28/21            # Pages in this Request:     X New Request   Additional Information for PA#:     Office Contact Name:  Sherren Charon Title: Utilization Review, Cehy Nurse     Phone: [phone]  Ext  Availability (Date/Time): Wednesday - Friday 8 am- 4 pm    x Inpatient Admission   SPU Review        Current       x Late Pick-up   · How your facility was first notified of the Late Pick-up:  · When your facility was first notified of the Late Pick-up (date):         RECIPIENT INFORMATION    Recipient ID#: [id number]  Recipient Name: Umair Keyes       YOB: 1960  63 y o  Recipient Alias:     Gender:  X Male  Female Medicaid Eligibility ([address] [place] [address]): INSURANCE INFORMATION    (All other private or governmental health insurance benefits must be utilized prior to billing the [place] Program)    Was this admission the result of an MVA, other accident, assault, injury, fall, gunshot, bite etc ? Yes X No                   If yes, provide a brief description of the incident  Does the recipient have other insurance coverage? Yes x No        Insurance Company Name/Policy #      Did that insurance pay on this claim? Yes  No        Did that insurance deny this claim? Yes  No    If yes, reason for denial:      Does the recipient have Medicare? Yes x No        Did Medicare exhaust prior to this admission? Yes  No        Did Medicare partially pay this claim? Yes  No        Did that insurance deny this claim? Yes  No    If yes, reason for denial:          Was the recipient a prisoner at the time of admission? Yes x No            PROVIDER INFORMATION    Hospital Name: [address] [place] [address] Provider ID#: [id number]    [address] [place] [address] Physician Name: Ventura Momin Provider ID#: [id number]        ADMISSION INFORMATION    Type of Admission: (please choose one)    X ED      Direct    If yes, from where?      Transfer    If yes, transferring hospital (inpatient, rehab, or psych) Provider Name/Provider ID#: Admission Floor or Unit Type: Med Surg     Dates/Times:        ED Date/Time: 4/26/2021 12:07 PM        Observation Date/Time: 4/26/2021  13:55        Admission Date/Time: 4/27/21 1506        Discharge or Transfer Date/Time: 5/1/2021  1:05 PM        DIAGNOSIS/PROCEDURE CODES    Primary Diagnosis Code/Primary Diagnosis Code description:  U07 1 COVID-19   J12 82 Pneumonia due to coronavirus disease 2019   R77 8 Other specified abnormalities of plasma proteins   E86 0 Dehydration    Additional Diagnosis Code(s) and Description(s)-(up to three additional codes):    Procedure Code (one) and description:        CLINICAL INFORMATION - PRIOR ADMISSION ONLY    Is there a prior admission with a discharge date within 30 days of the date of this admission? X No (Proceed to the next section - "Clinical Information - General Review Checklist:)      Yes (Provide the following information)     Prior admission dates:    MA Prior Authorization Number:        Review Outcome:     Diagnosis Code(s)/Description:    Procedure Code/Description:    Findings:    Treatment:    Condition on Discharge:   Vitals:    Labs:   Imaging:   Medications: Follow-up Instructions:    Disposition:        CLINICAL INFORMATION - GENERAL REVIEW CHECKLIST    EMERGENCY DEPARTMENT: (Proceed to "ADMISSION" if Direct Admission)    Presenting Signs/Symptoms:63year old male, presented to the ED @ [facility] from home via walk in  Admitted as Observation due to Chest Pain  past medical history significant of type 2 diabetes, hypertension      Medication/treatment prior to arrival in the ED:    Past Medical History:   Past Medical History:   Diagnosis Date    Diabetes mellitus ([facility] [address] )     Hypertension        Clinical Exam:    Initial Vital Signs: (Temp, Pulse, Resp, and BP)   ED Triage Vitals   Temperature Pulse Respirations Blood Pressure SpO2   04/26/21 1127 04/26/21 1127 04/26/21 1127 04/26/21 1127 04/26/21 1127   98 °F (36 7 °C) 89 (!) 24 165/82 99 %      Temp Source Heart Rate Source Patient Position - Orthostatic VS BP Location FiO2 (%)   04/26/21 1127 04/26/21 1127 04/26/21 1127 04/26/21 1127 --   Oral Monitor Sitting Left arm       Pain Score       04/26/21 2100       No Pain           Pertinent Repeat Vital Signs: (include times they were obtained)  Date/Time   Temp   Pulse   Resp   BP   MAP (mmHg)   SpO2   O2 Device   Patient Position - Orthostatic VS   04/27/21 08:13:48   99 5 °F (37 5 °C)   65   --   140/85   103   95 %   --   --   04/27/21 02:06:18   98 5 °F (36 9 °C)   60   --   105/57   73   94 %   --   --   04/27/21 0100   --   59   --   --   --   92 %   None (Room air)   --   04/26/21 2300   --   70   --   --   --   94 %   None (Room air)   --   04/26/21 21:25:46   101 6 °F (38 7 °C)Abnormal    83   18   176/99Abnormal    125   97 %   None (Room air)   Lying   04/26/21 2100   --   --   --   --   --   --   None (Room air)   --   04/26/21 2000   --   87   20   155/78   109   98 %   None (Room air)   Lying   04/26/21 1300   --   71   20   193/89Abnormal    128   92 %   --   --   04/26/21 1245   --   74   21   179/92Abnormal    127   98 %   --   --   04/26/21 1215   --   76   20   162/109Abnormal    131   98 %   None (Room air)          Pertinent Sustained Findings: (include times they were obtained)    Weight in Kilograms:  Wt Readings from Last 1 Encounters:   04/26/21 112 kg (245 lb 13 oz)       Pertinent Labs (results):  Results from last 7 days   Lab Units 04/26/21  1238   SARS-COV-2   Positive*            Results from last 7 days   Lab Units 04/26/21 1951 04/26/21  1238   WBC Thousand/uL  --  6 08   HEMOGLOBIN g/dL  --  14 2   HEMATOCRIT %  --  41 5   PLATELETS Thousands/uL 162 172   NEUTROS ABS Thousands/µL  --  4 67                Results from last 7 days   Lab Units 04/27/21  0524 04/26/21  1238   SODIUM mmol/L 139 140   POTASSIUM mmol/L 3 5 3 8   CHLORIDE mmol/L 109* 108   CO2 mmol/L 18* 21   ANION GAP mmol/L 12 11   BUN mg/dL 21 19   CREATININE mg/dL 1 70* 1 77*   EGFR ml/min/1 73sq m 43 41   CALCIUM mg/dL 7 8* 7 7*                 Results from last 7 days   Lab Units 21  0810 21  2240 21  1638   POC GLUCOSE mg/dl 61* 99 129            Results from last 7 days   Lab Units 21  0524 21  1238   GLUCOSE RANDOM mg/dL 108 137                Results from last 7 days   Lab Units 21  0524 21  0219 21  2309 21  1951 21  1708 21  1238   TROPONIN I ng/mL 0 09* 0 08* 0 07* 0 06* 0 05* 0 04           Results from last 7 days   Lab Units 21  1238   NT-PRO BNP pg/mL 521*       Radiology (results):  2021 @ 1311  Chest X:  Patchy peripheral pulmonary infiltrates which may indicate Covid pneumonia       EKG (results):   2021 @ 1708  EC, NSR, Nonspecific ST and T wave abnormality     Other tests (results):    Tests pending final results:    Treatment in the ED:   Medication Administration from 2021 1120 to 2021 2112       Date/Time Order Dose Route Action Comments     2021 1316 aspirin chewable tablet 324 mg 324 mg Oral Given      2021 1639 sodium chloride 0 9 % infusion 50 mL/hr Intravenous New Bag      2021 1640 heparin (porcine) subcutaneous injection 5,000 Units 5,000 Units Subcutaneous Given      2021 1640 amLODIPine (NORVASC) tablet 5 mg 5 mg Oral Given      2021 1639 insulin lispro (HumaLOG) 100 units/mL subcutaneous injection 1-6 Units 1 Units Subcutaneous Not Given 129 BS           Other treatments:      Change in condition while in the ED:     Response to ED Treatment:          OBSERVATION: (Proceed to "ADMISSION" if Direct Admission)    Orders written during the observation period  Meds Name, dose, route, time, how may doses given:   amLODIPine, 5 mg, Oral, Daily  ascorbic acid, 1,000 mg, Oral, Q12H [address]  cholecalciferol, 2,000 Units, Oral, Daily  heparin (porcine), 5,000 Units, Subcutaneous, Q8H [facility]  insulin lispro, 1-5 Units, Subcutaneous, HS  insulin lispro, 1-6 Units, Subcutaneous, TID AC  zinc sulfate, 220 mg, Oral, Daily    PRN Meds Name, dose, route, time, how many doses given within the first 24 hrs :    acetaminophen, 650 mg, Oral, Q6H PRN  hydrALAZINE, 5 mg, Intravenous, Q6H PRN  nitroglycerin, 0 4 mg, Sublingual, Q5 Min PRN  sodium chloride (PF), 3 mL, Intravenous, Q1H PRN          IVs Type, rate, and total amt  Ordered/given:    sodium chloride, 50 mL/hr, Intravenous, Continuous    Labs, imaging, other:  Consults and findings:    Test Results during the observation period  Pertinent Lab tests (dates/results):  Culture results (blood, urine, spinal, wound, respiratory, etc ):  Imaging tests (dates/results):  EKG (dates/results):   Other test (dates/results):  Tests pending (dates/results):    Surgical or Invasive Procedures during the observation period  Name of surgery/procedure:  Date & Time:  Patient Response:  Post-operative orders:  Operative Report/Findings:    Response to Treatment, Major Change in Condition, Major Charge in Treatment during the observation period          ADMISSION:    DIRECT Admissions Only:    · Presenting Signs/Symptoms:   ·   · Medication/treatment prior to arrival:  ·   · Past Medical History:  ·   · Clinical Exam on admission:  ·   · Vital Signs on admission: (Temp, Pulse, Resp, and BP)  ·   · Weight in kilograms:     ALL Admissions:    Admission Orders and Other Orders written within the first 24 hrs after admission  Meds Name, dose, route, time, how may doses given:  amLODIPine, 5 mg, Oral, Daily  ascorbic acid, 1,000 mg, Oral, Q12H [facility]  cholecalciferol, 2,000 Units, Oral, Daily  heparin (porcine), 5,000 Units, Subcutaneous, Q8H [facility]  insulin lispro, 1-5 Units, Subcutaneous, HS  insulin lispro, 1-6 Units, Subcutaneous, TID AC  zinc sulfate, 220 mg, Oral, Daily    Followed by  Debi Williamson ON 5/4/2021] multivitamin-minerals, 1 tablet, Oral, Daily  remdesivir (Veklury) 200 mg in sodium chloride 0 9 % 250 mL IVPB   Dose: 200 mg  Freq: Every 24 hours Route: IV  Indications of Use: INFECTION CAUSED BY 2019 NOVEL CORONAVIRUS  Start: 04/27/21 0930 End: 04/27/21 1129    Admin Instructions:   After the infusion is complete, flush the IV line with at least 30 mL of 0 9% saline at the same infusion rate to ensure that all the remdesivir solution has been administered  Order specific questions:   O2 requirement? None  Symptomatic? Yes  High-risk condition? Type II DM  High-risk condition? Obesity  High-risk condition? Kidney Disease      Followed by  remdesivir Merrianne Rome) 100 mg in sodium chloride 0 9 % 250 mL IVPB   Dose: 100 mg  Freq: Every 24 hours Route: IV  Indications of Use: INFECTION CAUSED BY 2019 NOVEL CORONAVIRUS  Start: 04/28/21 0930 End: 05/02/21 6652    Admin Instructions:   After the infusion is complete, flush the IV line with at least 30 mL of 0 9% saline at the same infusion rate to ensure that all the remdesivir solution has been administered  Order specific questions:   O2 requirement? None  Symptomatic? Yes  High-risk condition? Type II DM  High-risk condition? Obesity  High-risk condition? Kidney Disease                 PRN Meds Name, dose, route, time, how many doses given within the first 24 hrs :  acetaminophen, 650 mg, Oral, Q6H PRN  hydrALAZINE, 5 mg, Intravenous, Q6H PRN  nitroglycerin, 0 4 mg, Sublingual, Q5 Min PRN  sodium chloride (PF), 3 mL, Intravenous, Q1H PRN            IVs Type, rate, and total amt  Ordered/given:    sodium chloride, 50 mL/hr, Intravenous, Continuous       Labs, imaging, other:      Consults and findings:    Test Results after admission  Pertinent Lab tests (dates/results):  Culture results (blood, urine, spinal, wound, respiratory, etc ):  Imaging tests (dates/results):  EKG (dates/results):   Other test (dates/results):  Tests pending (dates/results):    Surgical or Invasive Procedures  Name of surgery/procedure:  Date & Time:  Patient Response:  Post-operative orders:  Operative Report/Findings:    Response to Treatment, Major Change in Condition, Major Charge in Treatment anytime during admission  Hospital Course:      Silvino Herrera is a 63 y o  male patient who originally presented to the hospital on 4/26/2021 due to chest pain  Patient with past medical history of diabetes type 2 on Tresiba, Hypertension  Patient found to be covid-19 positive on 4/26, he was started on the mild pathway and given a full 5 day course of Remdesivir with improvement  Fortunately, patient did not require any supplemental oxygen during his stay  Patient was given IVF for elevated creatinine, dehydration with improvement of his creatinine  Patient instructed to follow up with PCP and endocrinology on discharge      Disposition on Discharge  Home, Rehab, SNF, LTC, Shelter, etc : Home/Self Care    Cease to Breathe (CTB)  If a patient expires during an admission, in addition to the above information, please include:    Summary/timeline of the patient's decline in condition:    Medications and treatment:    Patient response to treatment:    Date and time patient ceased to breathe:        Is there a Readmission that follows this admission? Yes x No    If yes, provide dates:          InterQual Review    InterQual Criteria Met: x Yes  No  N/A        Please include the InterQual Review, InterQual year/version used, and the criteria selected:   Created Using Review Status Review Entered   Curriculet  In Primary 4/27/2021 15:06       Criteria Set Name - Subset   LOC:Acute Adult-Infection: ACDWP-62      Criteria Review   REVIEW SUMMARY     Patient: Hiram Pedro  Review Number: [id number]  Review Status:  In Primary  Criteria Status: Acute Met  Day Met: Episode Day 1     Condition Specific: Yes        OUTCOMES  Outcome Type: Primary     Benchmark Length of Stay: 6 0 days (IQ, COVID-19)        REVIEW DETAILS     Product: Jony David Adult  Subset: Infection: COVID-19      (Symptom or finding within 24h)         (Excludes PO medications unless noted)          [X] Select Day, One:              [X] Episode Day 1, One:                  [X] ACUTE, One:                      [X] COVID-19 test performed and, >= One:                          [X] COVID-19 test positive or high clinical suspicion and, >= One:                              [X] Pneumonia confirmed by imaging (CXR or CT) and, Both:                                  [X] Finding, >= One:                                      [X] O2 sat 90-94%(0 90-0 94) and < baseline and, >= One:                                          [X] Diabetes mellitus                                  [X] Intervention, >= One:                                      [X] COVID-19 targeted therapy (including approved therapy, emergency use authorization, or clinical trials)     Version: InterQual® 2020  Rene Beltran and InterQual®  © 2020 [employer] and/or one of its subsidiaries  All Rights Reserved  CPT only © 2019 [employer]  All Rights Reserved  PLEASE SUBMIT THE COMPLETED FORM TO THE DEPARTMENT OF HUMAN SERVICES - DIVISION OF CLINICAL  REVIEW VIA FAX -[phone] or VIA E-MAIL TO [email or web address]    Signature: Mar Florez Date:  05/28/21    Confidentiality Notice: The documents accompanying this telecopy may contain confidential information belonging to the sender  The information is intended only for the use of the individual named above  If you are not the intended recipient, you are hereby notified  That any disclosure, copying, distribution or taking of any telecopy is strictly prohibited

## 2021-08-22 ENCOUNTER — APPOINTMENT (EMERGENCY)
Dept: RADIOLOGY | Facility: HOSPITAL | Age: 61
End: 2021-08-22
Payer: COMMERCIAL

## 2021-08-22 ENCOUNTER — APPOINTMENT (EMERGENCY)
Dept: CT IMAGING | Facility: HOSPITAL | Age: 61
End: 2021-08-22
Payer: COMMERCIAL

## 2021-08-22 ENCOUNTER — HOSPITAL ENCOUNTER (OUTPATIENT)
Facility: HOSPITAL | Age: 61
Setting detail: OBSERVATION
Discharge: LEFT BEFORE DISCHARGE | End: 2021-08-23
Attending: EMERGENCY MEDICINE | Admitting: INTERNAL MEDICINE
Payer: COMMERCIAL

## 2021-08-22 DIAGNOSIS — R07.9 CHEST PAIN: Primary | ICD-10-CM

## 2021-08-22 DIAGNOSIS — R31.0 GROSS HEMATURIA: ICD-10-CM

## 2021-08-22 DIAGNOSIS — I25.10 CORONARY ARTERY DISEASE: ICD-10-CM

## 2021-08-22 DIAGNOSIS — N20.0 KIDNEY STONE: ICD-10-CM

## 2021-08-22 DIAGNOSIS — N21.0 BLADDER STONES: ICD-10-CM

## 2021-08-22 DIAGNOSIS — E83.42 HYPOMAGNESEMIA: ICD-10-CM

## 2021-08-22 PROBLEM — N18.9 CKD (CHRONIC KIDNEY DISEASE): Status: ACTIVE | Noted: 2021-04-26

## 2021-08-22 LAB
ALBUMIN SERPL BCP-MCNC: 3.1 G/DL (ref 3.5–5)
ALP SERPL-CCNC: 59 U/L (ref 46–116)
ALT SERPL W P-5'-P-CCNC: 28 U/L (ref 12–78)
ANION GAP SERPL CALCULATED.3IONS-SCNC: 14 MMOL/L (ref 4–13)
APTT PPP: 28 SECONDS (ref 23–37)
AST SERPL W P-5'-P-CCNC: 19 U/L (ref 5–45)
ATRIAL RATE: 56 BPM
ATRIAL RATE: 61 BPM
BACTERIA UR QL AUTO: ABNORMAL /HPF
BASOPHILS # BLD AUTO: 0.04 THOUSANDS/ΜL (ref 0–0.1)
BASOPHILS NFR BLD AUTO: 0 % (ref 0–1)
BILIRUB DIRECT SERPL-MCNC: 0.09 MG/DL (ref 0–0.2)
BILIRUB SERPL-MCNC: 0.41 MG/DL (ref 0.2–1)
BILIRUB UR QL STRIP: NEGATIVE
BUN SERPL-MCNC: 15 MG/DL (ref 5–25)
CALCIUM SERPL-MCNC: 7.9 MG/DL (ref 8.3–10.1)
CHLORIDE SERPL-SCNC: 110 MMOL/L (ref 100–108)
CLARITY UR: CLEAR
CO2 SERPL-SCNC: 22 MMOL/L (ref 21–32)
COLOR UR: YELLOW
CREAT SERPL-MCNC: 1.32 MG/DL (ref 0.6–1.3)
D DIMER PPP FEU-MCNC: 0.37 UG/ML FEU
EOSINOPHIL # BLD AUTO: 0.31 THOUSAND/ΜL (ref 0–0.61)
EOSINOPHIL NFR BLD AUTO: 3 % (ref 0–6)
ERYTHROCYTE [DISTWIDTH] IN BLOOD BY AUTOMATED COUNT: 13.2 % (ref 11.6–15.1)
GFR SERPL CREATININE-BSD FRML MDRD: 57 ML/MIN/1.73SQ M
GLUCOSE SERPL-MCNC: 131 MG/DL (ref 65–140)
GLUCOSE SERPL-MCNC: 135 MG/DL (ref 65–140)
GLUCOSE SERPL-MCNC: 163 MG/DL (ref 65–140)
GLUCOSE SERPL-MCNC: 96 MG/DL (ref 65–140)
GLUCOSE UR STRIP-MCNC: ABNORMAL MG/DL
HCT VFR BLD AUTO: 37.8 % (ref 36.5–49.3)
HGB BLD-MCNC: 13 G/DL (ref 12–17)
HGB UR QL STRIP.AUTO: ABNORMAL
IMM GRANULOCYTES # BLD AUTO: 0.03 THOUSAND/UL (ref 0–0.2)
IMM GRANULOCYTES NFR BLD AUTO: 0 % (ref 0–2)
INR PPP: 1.03 (ref 0.84–1.19)
KETONES UR STRIP-MCNC: NEGATIVE MG/DL
LEUKOCYTE ESTERASE UR QL STRIP: NEGATIVE
LYMPHOCYTES # BLD AUTO: 2.48 THOUSANDS/ΜL (ref 0.6–4.47)
LYMPHOCYTES NFR BLD AUTO: 27 % (ref 14–44)
MAGNESIUM SERPL-MCNC: 1.5 MG/DL (ref 1.6–2.6)
MCH RBC QN AUTO: 28.8 PG (ref 26.8–34.3)
MCHC RBC AUTO-ENTMCNC: 34.4 G/DL (ref 31.4–37.4)
MCV RBC AUTO: 84 FL (ref 82–98)
MONOCYTES # BLD AUTO: 0.67 THOUSAND/ΜL (ref 0.17–1.22)
MONOCYTES NFR BLD AUTO: 7 % (ref 4–12)
NEUTROPHILS # BLD AUTO: 5.82 THOUSANDS/ΜL (ref 1.85–7.62)
NEUTS SEG NFR BLD AUTO: 63 % (ref 43–75)
NITRITE UR QL STRIP: NEGATIVE
NON-SQ EPI CELLS URNS QL MICRO: ABNORMAL /HPF
NRBC BLD AUTO-RTO: 0 /100 WBCS
NT-PROBNP SERPL-MCNC: 840 PG/ML
P AXIS: 52 DEGREES
P AXIS: 59 DEGREES
PH UR STRIP.AUTO: 6 [PH]
PLATELET # BLD AUTO: 291 THOUSANDS/UL (ref 149–390)
PMV BLD AUTO: 9.5 FL (ref 8.9–12.7)
POTASSIUM SERPL-SCNC: 3.7 MMOL/L (ref 3.5–5.3)
PR INTERVAL: 162 MS
PR INTERVAL: 168 MS
PROT SERPL-MCNC: 6.2 G/DL (ref 6.4–8.2)
PROT UR STRIP-MCNC: ABNORMAL MG/DL
PROTHROMBIN TIME: 13 SECONDS (ref 11.6–14.5)
QRS AXIS: -12 DEGREES
QRS AXIS: 37 DEGREES
QRSD INTERVAL: 94 MS
QRSD INTERVAL: 98 MS
QT INTERVAL: 454 MS
QT INTERVAL: 468 MS
QTC INTERVAL: 451 MS
QTC INTERVAL: 457 MS
RBC # BLD AUTO: 4.51 MILLION/UL (ref 3.88–5.62)
RBC #/AREA URNS AUTO: ABNORMAL /HPF
SODIUM SERPL-SCNC: 146 MMOL/L (ref 136–145)
SP GR UR STRIP.AUTO: 1.01 (ref 1–1.03)
T WAVE AXIS: -5 DEGREES
T WAVE AXIS: 33 DEGREES
TROPONIN I SERPL-MCNC: <0.02 NG/ML
UROBILINOGEN UR QL STRIP.AUTO: 0.2 E.U./DL
VENTRICULAR RATE: 56 BPM
VENTRICULAR RATE: 61 BPM
WBC # BLD AUTO: 9.35 THOUSAND/UL (ref 4.31–10.16)
WBC #/AREA URNS AUTO: ABNORMAL /HPF

## 2021-08-22 PROCEDURE — 93005 ELECTROCARDIOGRAM TRACING: CPT

## 2021-08-22 PROCEDURE — 36415 COLL VENOUS BLD VENIPUNCTURE: CPT | Performed by: EMERGENCY MEDICINE

## 2021-08-22 PROCEDURE — 83880 ASSAY OF NATRIURETIC PEPTIDE: CPT | Performed by: EMERGENCY MEDICINE

## 2021-08-22 PROCEDURE — 96361 HYDRATE IV INFUSION ADD-ON: CPT

## 2021-08-22 PROCEDURE — 84484 ASSAY OF TROPONIN QUANT: CPT | Performed by: INTERNAL MEDICINE

## 2021-08-22 PROCEDURE — 71046 X-RAY EXAM CHEST 2 VIEWS: CPT

## 2021-08-22 PROCEDURE — 96366 THER/PROPH/DIAG IV INF ADDON: CPT

## 2021-08-22 PROCEDURE — 80048 BASIC METABOLIC PNL TOTAL CA: CPT | Performed by: EMERGENCY MEDICINE

## 2021-08-22 PROCEDURE — 85610 PROTHROMBIN TIME: CPT | Performed by: EMERGENCY MEDICINE

## 2021-08-22 PROCEDURE — 80076 HEPATIC FUNCTION PANEL: CPT | Performed by: EMERGENCY MEDICINE

## 2021-08-22 PROCEDURE — 99285 EMERGENCY DEPT VISIT HI MDM: CPT

## 2021-08-22 PROCEDURE — 82948 REAGENT STRIP/BLOOD GLUCOSE: CPT

## 2021-08-22 PROCEDURE — 83735 ASSAY OF MAGNESIUM: CPT | Performed by: EMERGENCY MEDICINE

## 2021-08-22 PROCEDURE — 99220 PR INITIAL OBSERVATION CARE/DAY 70 MINUTES: CPT | Performed by: INTERNAL MEDICINE

## 2021-08-22 PROCEDURE — 85730 THROMBOPLASTIN TIME PARTIAL: CPT | Performed by: EMERGENCY MEDICINE

## 2021-08-22 PROCEDURE — 81001 URINALYSIS AUTO W/SCOPE: CPT | Performed by: EMERGENCY MEDICINE

## 2021-08-22 PROCEDURE — 74177 CT ABD & PELVIS W/CONTRAST: CPT

## 2021-08-22 PROCEDURE — 93010 ELECTROCARDIOGRAM REPORT: CPT | Performed by: INTERNAL MEDICINE

## 2021-08-22 PROCEDURE — 85025 COMPLETE CBC W/AUTO DIFF WBC: CPT | Performed by: EMERGENCY MEDICINE

## 2021-08-22 PROCEDURE — 99285 EMERGENCY DEPT VISIT HI MDM: CPT | Performed by: EMERGENCY MEDICINE

## 2021-08-22 PROCEDURE — 96365 THER/PROPH/DIAG IV INF INIT: CPT

## 2021-08-22 PROCEDURE — 85379 FIBRIN DEGRADATION QUANT: CPT | Performed by: EMERGENCY MEDICINE

## 2021-08-22 PROCEDURE — 84484 ASSAY OF TROPONIN QUANT: CPT | Performed by: EMERGENCY MEDICINE

## 2021-08-22 RX ORDER — ASPIRIN 81 MG/1
243 TABLET, CHEWABLE ORAL ONCE
Status: COMPLETED | OUTPATIENT
Start: 2021-08-22 | End: 2021-08-22

## 2021-08-22 RX ORDER — NITROGLYCERIN 0.4 MG/1
0.4 TABLET SUBLINGUAL ONCE
Status: COMPLETED | OUTPATIENT
Start: 2021-08-22 | End: 2021-08-22

## 2021-08-22 RX ORDER — ACETAMINOPHEN 325 MG/1
650 TABLET ORAL EVERY 6 HOURS PRN
Status: DISCONTINUED | OUTPATIENT
Start: 2021-08-22 | End: 2021-08-23 | Stop reason: HOSPADM

## 2021-08-22 RX ORDER — NITROGLYCERIN 0.4 MG/1
0.4 TABLET SUBLINGUAL
Status: DISCONTINUED | OUTPATIENT
Start: 2021-08-22 | End: 2021-08-23 | Stop reason: HOSPADM

## 2021-08-22 RX ORDER — SODIUM CHLORIDE 9 MG/ML
125 INJECTION, SOLUTION INTRAVENOUS CONTINUOUS
Status: DISCONTINUED | OUTPATIENT
Start: 2021-08-22 | End: 2021-08-23 | Stop reason: HOSPADM

## 2021-08-22 RX ORDER — ASPIRIN 81 MG/1
81 TABLET, CHEWABLE ORAL
COMMUNITY

## 2021-08-22 RX ORDER — MAGNESIUM SULFATE HEPTAHYDRATE 40 MG/ML
2 INJECTION, SOLUTION INTRAVENOUS ONCE
Status: COMPLETED | OUTPATIENT
Start: 2021-08-22 | End: 2021-08-22

## 2021-08-22 RX ORDER — LISINOPRIL 10 MG/1
10 TABLET ORAL DAILY
COMMUNITY

## 2021-08-22 RX ADMIN — NITROGLYCERIN 1 INCH: 20 OINTMENT TOPICAL at 08:08

## 2021-08-22 RX ADMIN — ASPIRIN 243 MG: 81 TABLET, CHEWABLE ORAL at 08:08

## 2021-08-22 RX ADMIN — MAGNESIUM SULFATE HEPTAHYDRATE 2 G: 40 INJECTION, SOLUTION INTRAVENOUS at 09:01

## 2021-08-22 RX ADMIN — SODIUM CHLORIDE 500 ML: 0.9 INJECTION, SOLUTION INTRAVENOUS at 08:09

## 2021-08-22 RX ADMIN — NITROGLYCERIN 0.4 MG: 0.4 TABLET SUBLINGUAL at 08:08

## 2021-08-22 RX ADMIN — IOHEXOL 100 ML: 350 INJECTION, SOLUTION INTRAVENOUS at 09:36

## 2021-08-22 RX ADMIN — INSULIN LISPRO 1 UNITS: 100 INJECTION, SOLUTION INTRAVENOUS; SUBCUTANEOUS at 17:02

## 2021-08-22 RX ADMIN — SODIUM CHLORIDE 125 ML/HR: 0.9 INJECTION, SOLUTION INTRAVENOUS at 12:44

## 2021-08-22 NOTE — ASSESSMENT & PLAN NOTE
Lab Results   Component Value Date    EGFR 57 08/22/2021    EGFR 50 05/01/2021    EGFR 49 04/30/2021    CREATININE 1 32 (H) 08/22/2021    CREATININE 1 49 (H) 05/01/2021    CREATININE 1 52 (H) 04/30/2021   Creatinine at baseline  Continue monitor

## 2021-08-22 NOTE — ASSESSMENT & PLAN NOTE
Lab Results   Component Value Date    HGBA1C 10 5 (H) 04/28/2021       No results for input(s): POCGLU in the last 72 hours      Blood Sugar Average: Last 72 hrs:   continue home insulin  Sliding scale insulin

## 2021-08-22 NOTE — ASSESSMENT & PLAN NOTE
patient reports several weeks of gross hematuria  CT scan revealed Distended bladder containing multiple large dependent calculi measuring up to 2 4 cm    Urinary retention protocol  Will have Urology evaluate for possible CBI versus intervention  Will place on fluids for now

## 2021-08-22 NOTE — ED PROVIDER NOTES
History  Chief Complaint   Patient presents with    Chest Pain     pt c/o intermittent cp for a week  Patient is a 69-year-old male with past medical history of hypertension, diabetes, coronary artery disease status post recent acute MI and 3 stents placed on 7/16/2021 in [place], presents to the emergency department complaining of intermittent chest pain for the past 1-2 weeks  Patient reports he has felt heaviness in the center and left side of his chest on and off for 2 weeks  Pain is worsened when he is lying on his abdomen or when he is exerting himself  He also reports he gets short of breath with exertion as well as exertional lightheadedness  He denies breaking out to sweats, any fevers, chills, cough, hemoptysis or URI symptoms  Denies any dyspnea at rest, palpitations, abdominal pain or distention, nausea, vomiting, diarrhea, constipation  Patient also reports ever since his hospitalization in [place], he has had gross hematuria and is passing blood clots when he pees  He denies difficulty urinating but reports it is uncomfortable when he is trying to pass clots  Denies change in frequency or flank pain  Patient takes low-dose aspirin and Plavix daily  No anticoagulant agents  He does report history of kidney stones but denies any flank or abdominal pain  He denies any skin rash or color change, leg pain or swelling, lateralizing weakness or extremity paresthesia or other focal neurologic deficits  History provided by:  Patient   used: No    Chest Pain  Associated symptoms: shortness of breath    Associated symptoms: no abdominal pain, no back pain, no cough, no diaphoresis, no dizziness, no fever, no headache, no nausea, no numbness, no palpitations, not vomiting and no weakness        Prior to Admission Medications   Prescriptions Last Dose Informant Patient Reported? Taking?    Alcohol Swabs 70 % PADS   No No   Sig: May substitute brand based on insurance coverage  Check glucose ACHS  Insulin Degludec (TRESIBA FLEXTOUCH SC)   Yes No   Sig: Inject 120 mg under the skin daily   Lancets (freestyle) lancets   No No   Sig: May substitute brand based on insurance coverage  Check glucose ACHS  amLODIPine (NORVASC) 5 mg tablet   No No   Sig: Take 1 tablet (5 mg total) by mouth daily   ascorbic acid (VITAMIN C) 1000 MG tablet   No No   Sig: Take 1 tablet (1,000 mg total) by mouth every 12 (twelve) hours for 6 doses   benzonatate (TESSALON PERLES) 100 mg capsule   No No   Sig: Take 1 capsule (100 mg total) by mouth 3 (three) times a day as needed for cough   cholecalciferol (VITAMIN D3) 1,000 units tablet   No No   Sig: Take 2 tablets (2,000 Units total) by mouth daily   glucose blood (FREESTYLE TEST STRIPS) test strip   No No   Sig: May substitute brand based on insurance coverage  Check glucose ACHS  glucose monitoring kit (FREESTYLE) monitoring kit   No No   Sig: May substitute brand based on insurance coverage  Check glucose ACHS  Facility-Administered Medications: None       Past Medical History:   Diagnosis Date    Diabetes mellitus ([facility] [address] )     Hypertension        No past surgical history on file  No family history on file  I have reviewed and agree with the history as documented  E-Cigarette/Vaping     E-Cigarette/Vaping Substances     Social History     Tobacco Use    Smoking status: Never Smoker    Smokeless tobacco: Never Used   Substance Use Topics    Alcohol use: Yes    Drug use: Never       Review of Systems   Constitutional: Negative for chills, diaphoresis and fever  HENT: Negative for congestion, ear pain, rhinorrhea and sore throat  Respiratory: Positive for shortness of breath  Negative for cough, chest tightness and wheezing  Cardiovascular: Positive for chest pain  Negative for palpitations and leg swelling     Gastrointestinal: Negative for abdominal distention, abdominal pain, blood in stool, constipation, diarrhea, nausea and vomiting  Genitourinary: Positive for dysuria and hematuria  Negative for flank pain and frequency  Musculoskeletal: Negative for back pain, neck pain and neck stiffness  Skin: Negative for color change, pallor, rash and wound  Allergic/Immunologic: Negative for immunocompromised state  Neurological: Positive for light-headedness  Negative for dizziness, syncope, weakness, numbness and headaches  Hematological: Negative for adenopathy  Bruises/bleeds easily  Psychiatric/Behavioral: Negative for confusion and decreased concentration  All other systems reviewed and are negative  Physical Exam  Physical Exam  Vitals and nursing note reviewed  Constitutional:       General: He is not in acute distress  Appearance: Normal appearance  He is well-developed  He is not ill-appearing, toxic-appearing or diaphoretic  HENT:      Head: Normocephalic and atraumatic  Right Ear: External ear normal       Left Ear: External ear normal       Mouth/Throat:      Comments: Orpharyngeal exam deferred at this time due to risk of exposure to COVID-19 during current pandemic  Patient has no oropharyngeal complaints  Eyes:      Extraocular Movements: Extraocular movements intact  Conjunctiva/sclera: Conjunctivae normal    Neck:      Vascular: No JVD  Cardiovascular:      Rate and Rhythm: Normal rate and regular rhythm  Pulses: Normal pulses  Heart sounds: Normal heart sounds  No murmur heard  No friction rub  No gallop  Pulmonary:      Effort: Pulmonary effort is normal  No respiratory distress  Breath sounds: Normal breath sounds  No wheezing, rhonchi or rales  Chest:      Chest wall: No tenderness  Abdominal:      General: There is no distension  Palpations: Abdomen is soft  Tenderness: There is no abdominal tenderness  There is no guarding or rebound  Musculoskeletal:         General: No swelling or tenderness  Normal range of motion        Cervical back: Normal range of motion and neck supple  No rigidity  Right lower leg: No edema  Left lower leg: No edema  Skin:     General: Skin is warm and dry  Coloration: Skin is not pale  Findings: No erythema or rash  Neurological:      General: No focal deficit present  Mental Status: He is alert and oriented to person, place, and time  Sensory: No sensory deficit  Motor: No weakness     Psychiatric:         Mood and Affect: Mood normal          Behavior: Behavior normal          Vital Signs  ED Triage Vitals   Temperature Pulse Respirations Blood Pressure SpO2   08/22/21 0729 08/22/21 0728 08/22/21 0728 08/22/21 0729 08/22/21 0728   97 8 °F (36 6 °C) 58 19 (!) 205/107 98 %      Temp Source Heart Rate Source Patient Position - Orthostatic VS BP Location FiO2 (%)   08/22/21 0729 08/22/21 0728 08/22/21 0728 08/22/21 0728 --   Temporal Monitor Sitting Right arm       Pain Score       08/22/21 0728       5         Vitals:    08/22/21 0945 08/22/21 1113 08/22/21 1200 08/22/21 1604   BP: 161/89 147/90 136/78 163/80   BP Location: Right arm Right arm Right arm Left arm   Pulse: 55 69 56 63   Resp: 18 17 22 21   Temp:       TempSrc:       SpO2: 99% 99% 97% 95%   Weight:       Height:           Visual Acuity      ED Medications  Medications   nitroglycerin (NITROSTAT) SL tablet 0 4 mg (has no administration in time range)   acetaminophen (TYLENOL) tablet 650 mg (has no administration in time range)   insulin lispro (HumaLOG) 100 units/mL subcutaneous injection 1-6 Units (1 Units Subcutaneous Not Given 8/22/21 1315)   insulin lispro (HumaLOG) 100 units/mL subcutaneous injection 1-5 Units (has no administration in time range)   sodium chloride 0 9 % infusion (125 mL/hr Intravenous New Bag 8/22/21 1244)   sodium chloride 0 9 % bolus 500 mL (0 mL Intravenous Stopped 8/22/21 1244)   aspirin chewable tablet 243 mg (243 mg Oral Given 8/22/21 0808)   nitroglycerin (NITROSTAT) SL tablet 0 4 mg (0 4 mg Sublingual Given 8/22/21 0808)   nitroglycerin (NITRO-BID) 2 % TD ointment 1 inch (1 inch Topical Given 8/22/21 0808)   magnesium sulfate 2 g/50 mL IVPB (premix) 2 g (0 g Intravenous Stopped 8/22/21 1108)   iohexol (OMNIPAQUE) 350 MG/ML injection (SINGLE-DOSE) 100 mL (100 mL Intravenous Given 8/22/21 0936)       Diagnostic Studies  Results Reviewed     Procedure Component Value Units Date/Time    Troponin I [[id number]]  (Normal) Collected: 08/22/21 1506    Lab Status: Final result Specimen: Blood from Arm, Right Updated: 08/22/21 1531     Troponin I <0 02 ng/mL     Urine Microscopic [[id number]]  (Abnormal) Collected: 08/22/21 1246    Lab Status: Final result Specimen: Urine, Clean Catch Updated: 08/22/21 1320     RBC, UA Innumerable /hpf      WBC, UA 1-2 /hpf      Epithelial Cells Occasional /hpf      Bacteria, UA None Seen /hpf     Fingerstick Glucose (POCT) [[id number]]  (Normal) Collected: 08/22/21 1314    Lab Status: Final result Updated: 08/22/21 1315     POC Glucose 135 mg/dl     UA w Reflex to Microscopic w Reflex to Culture [[id number]]  (Abnormal) Collected: 08/22/21 1246    Lab Status: Final result Specimen: Urine, Clean Catch Updated: 08/22/21 1251     Color, UA Yellow     Clarity, UA Clear     Specific Gravity, UA 1 015     pH, UA 6 0     Leukocytes, UA Negative     Nitrite, UA Negative     Protein, UA 30 (1+) mg/dl      Glucose,  (1/10%) mg/dl      Ketones, UA Negative mg/dl      Urobilinogen, UA 0 2 E U /dl      Bilirubin, UA Negative     Blood, UA Large    Troponin I repeat in 3hrs [[id number]]  (Normal) Collected: 08/22/21 1109    Lab Status: Final result Specimen: Blood from Arm, Right Updated: 08/22/21 1134     Troponin I <0 02 ng/mL     Hepatic function panel [[id number]]  (Abnormal) Collected: 08/22/21 0807    Lab Status: Final result Specimen: Blood from Arm, Right Updated: 08/22/21 0840     Total Bilirubin 0 41 mg/dL      Bilirubin, Direct 0 09 mg/dL      Alkaline Phosphatase 59 U/L      AST 19 U/L ALT 28 U/L      Total Protein 6 2 g/dL      Albumin 3 1 g/dL     Magnesium [[id number]]  (Abnormal) Collected: 08/22/21 0807    Lab Status: Final result Specimen: Blood from Arm, Right Updated: 08/22/21 0840     Magnesium 1 5 mg/dL     NT-BNP PRO [[id number]]  (Abnormal) Collected: 08/22/21 0807    Lab Status: Final result Specimen: Blood from Arm, Right Updated: 08/22/21 0840     NT-proBNP 840 pg/mL     Troponin I [[id number]]  (Normal) Collected: 08/22/21 0807    Lab Status: Final result Specimen: Blood from Arm, Right Updated: 08/22/21 0835     Troponin I <0 02 ng/mL     Basic metabolic panel [[id number]]  (Abnormal) Collected: 08/22/21 0807    Lab Status: Final result Specimen: Blood from Arm, Right Updated: 08/22/21 9093     Sodium 146 mmol/L      Potassium 3 7 mmol/L      Chloride 110 mmol/L      CO2 22 mmol/L      ANION GAP 14 mmol/L      BUN 15 mg/dL      Creatinine 1 32 mg/dL      Glucose 96 mg/dL      Calcium 7 9 mg/dL      eGFR 57 ml/min/1 73sq m     Narrative:      [place] guidelines for Chronic Kidney Disease (CKD):     Stage 1 with normal or high GFR (GFR > 90 mL/min/1 73 square meters)    Stage 2 Mild CKD (GFR = 60-89 mL/min/1 73 square meters)    Stage 3A Moderate CKD (GFR = 45-59 mL/min/1 73 square meters)    Stage 3B Moderate CKD (GFR = 30-44 mL/min/1 73 square meters)    Stage 4 Severe CKD (GFR = 15-29 mL/min/1 73 square meters)    Stage 5 End Stage CKD (GFR <15 mL/min/1 73 square meters)  Note: GFR calculation is accurate only with a steady state creatinine    D-Dimer [[id number]]  (Normal) Collected: 08/22/21 0807    Lab Status: Final result Specimen: Blood from Arm, Right Updated: 08/22/21 0831     D-Dimer, Quant 0 37 ug/ml FEU     Protime-INR [[id number]]  (Normal) Collected: 08/22/21 0807    Lab Status: Final result Specimen: Blood from Arm, Right Updated: 08/22/21 0829     Protime 13 0 seconds      INR 1 03    APTT [[id number]]  (Normal) Collected: 08/22/21 1163 Lab Status: Final result Specimen: Blood from Arm, Right Updated: 08/22/21 0829     PTT 28 seconds     CBC and differential [[id number]] Collected: 08/22/21 0807    Lab Status: Final result Specimen: Blood from Arm, Right Updated: 08/22/21 0820     WBC 9 35 Thousand/uL      RBC 4 51 Million/uL      Hemoglobin 13 0 g/dL      Hematocrit 37 8 %      MCV 84 fL      MCH 28 8 pg      MCHC 34 4 g/dL      RDW 13 2 %      MPV 9 5 fL      Platelets 450 Thousands/uL      nRBC 0 /100 WBCs      Neutrophils Relative 63 %      Immat GRANS % 0 %      Lymphocytes Relative 27 %      Monocytes Relative 7 %      Eosinophils Relative 3 %      Basophils Relative 0 %      Neutrophils Absolute 5 82 Thousands/µL      Immature Grans Absolute 0 03 Thousand/uL      Lymphocytes Absolute 2 48 Thousands/µL      Monocytes Absolute 0 67 Thousand/µL      Eosinophils Absolute 0 31 Thousand/µL      Basophils Absolute 0 04 Thousands/µL                  CT abdomen pelvis with contrast   Final Result by Chantelle Hoover MD (08/22 1012)      1  Distended bladder containing multiple large dependent calculi measuring up to 2 4 cm  Mild diffuse bladder wall thickening is nonspecific and may be due to cystitis and/or chronic outlet obstruction from prostatomegaly  2   No hydroureteronephrosis  2 mm nonobstructing right renal calculi  3   Splenomegaly  Workstation performed: [id number]         XR chest 2 views   ED Interpretation by Terrell Lucio DO (08/22 0818)   No acute abnormality in the chest       Final Result by Laurie eBrman MD (08/22 1607)      No acute cardiopulmonary disease                    Workstation performed: [id number]                    Procedures  ECG 12 Lead Documentation Only    Date/Time: 8/22/2021 7:57 AM  Performed by: Terrell Lucio DO  Authorized by: Terrell Lucio DO     ECG reviewed by me, the ED Provider: yes    Patient location:  ED  Previous ECG:     Previous ECG:  Compared to current Comparison ECG info:  4-; PACs are no longer present  Inferior lead ST depression is no longer present  Rate:     ECG rate:  61    ECG rate assessment: normal    Rhythm:     Rhythm: sinus rhythm    Ectopy:     Ectopy: none    QRS:     QRS axis:  Normal    QRS intervals:  Normal  Conduction:     Conduction: normal    ST segments:     ST segments:  Normal  T waves:     T waves: inverted      Inverted:  V6, II, III and aVF    ECG 12 Lead Documentation Only    Date/Time: 8/22/2021 11:32 AM  Performed by: Alma Rosa Feliciano DO  Authorized by: Alma Rosa Feliciano DO     ECG reviewed by me, the ED Provider: yes    Patient location:  ED  Previous ECG:     Previous ECG:  Compared to current    Similarity:  No change  Rate:     ECG rate:  56    ECG rate assessment: bradycardic    Rhythm:     Rhythm: sinus bradycardia    Ectopy:     Ectopy: none    QRS:     QRS axis:  Normal    QRS intervals:  Normal  Conduction:     Conduction: normal    ST segments:     ST segments:  Normal  T waves:     T waves: non-specific and inverted      Inverted:  V5 and V6             ED Course  ED Course as of Aug 22 1630   Sun Aug 22, 2021   0844 Troponin I: <0 02   0845 D-Dimer, Quant: 0 37   0845 Given normal D-dimer, will obtain only CT abdomen and pelvis for evaluation of gross hematuria  1052 Updated patient about results thus far  Patient is agreeable to staying for cardiac observation  Pain improved after nitroglycerin        1312 Leukocytes, UA: Negative   1312 Nitrite, UA: Negative             HEART Risk Score      Most Recent Value   Heart Score Risk Calculator   History  2 Filed at: 08/22/2021 1059   ECG  1 Filed at: 08/22/2021 1059   Age  1 Filed at: 08/22/2021 1059   Risk Factors  2 Filed at: 08/22/2021 1059   Troponin  0 Filed at: 08/22/2021 1059   HEART Score  6 Filed at: 08/22/2021 1059              [address]  Rule for PE      Most Recent Value   PERC Rule for PE   Age >=50  1 Filed at: 08/22/2021 0754   HR >=100  0 Filed at: 08/22/2021 0754   O2 Sat on room air < 95%  0 Filed at: 08/22/2021 0754   History of PE or DVT  0 Filed at: 08/22/2021 7691   Recent trauma or surgery  1 Filed at: 08/22/2021 0754   Hemoptysis  0 Filed at: 08/22/2021 0754   Exogenous estrogen  0 Filed at: 08/22/2021 0754   Unilateral leg swelling  0 Filed at: 08/22/2021 0754   PERC Rule for PE Results  2 Filed at: 08/22/2021 0754                EDY Risk Score      Most Recent Value   Age >= 72  0 Filed at: 08/22/2021 1146   Known CAD (stenosis >= 50%)  1 Filed at: 08/22/2021 1146   Recent (<=24 hrs) Service Angina  1 Filed at: 08/22/2021 1146   ST Deviation >= 0 5 mm  0 Filed at: 08/22/2021 1146   3+ CAD Risk Factors (FHx, HTN, HLP, DM, Smoker)  1 Filed at: 08/22/2021 1146   Aspirin Use Past 7 Days  1 Filed at: 08/22/2021 1146   Elevated Cardiac Markers  0 Filed at: 08/22/2021 1146   EDY Risk Score (Calculated)  4 Filed at: 08/22/2021 1146        Wells' Criteria for PE      Most Recent Value   Wells' Criteria for PE   Clinical signs and symptoms of DVT  0 Filed at: 08/22/2021 1974   PE is primary diagnosis or equally likely  0 Filed at: 08/22/2021 0754   HR >100  0 Filed at: 08/22/2021 0754   Immobilization at least 3 days or Surgery in the previous 4 weeks  1 5 Filed at: 08/22/2021 0754   Previous, objectively diagnosed PE or DVT  0 Filed at: 08/22/2021 0754   Hemoptysis  0 Filed at: 08/22/2021 4848   Malignancy with treatment within 6 months or palliative  0 Filed at: 08/22/2021 3633   Wells' Criteria Total  1 5 Filed at: 08/22/2021 0135                MDM  Number of Diagnoses or Management Options  Diagnosis management comments: 69-year-old male presents to the ED with intermittent chest heaviness, exertional dyspnea and lightheadedness that started 1-2 weeks ago  Patient is status post recent coronary PCI in which 3 stents were placed in mid July in [place]  There is concern for stent restenosis, other arterial blockage, unstable angina, PE, pleurisy  Overall low suspicion for aortic dissection  Will workup with cardiac labs, D-dimer, portable chest x-ray  If D-dimer is elevated will pursue CTA of the chest as well as CT abdomen and pelvis due to gross hematuria  Hematuria could be secondary to hemorrhagic cystitis, bladder injury during recent catheterization  Will provide further aspirin for cardiac protection, nitroglycerin sublingual as well as paced  Will recommend observation admission given heart score of 6  Amount and/or Complexity of Data Reviewed  Clinical lab tests: ordered and reviewed  Tests in the radiology section of CPT®: ordered and reviewed  Tests in the medicine section of CPT®: reviewed and ordered  Independent visualization of images, tracings, or specimens: yes        Disposition  Final diagnoses:   Chest pain   Gross hematuria   Bladder stones   Hypomagnesemia     Time reflects when diagnosis was documented in both MDM as applicable and the Disposition within this note     Time User Action Codes Description Comment    8/22/2021 10:57 AM Josepha Bev E Add [R07 9] Chest pain     8/22/2021 10:58 AM Josepha Bev E Add [R31 0] Gross hematuria     8/22/2021 10:58 AM Josepha Bev E Add [N21 0] Bladder stones     8/22/2021 10:59 AM Josepha Bev E Add [E83 42] Hypomagnesemia       ED Disposition     ED Disposition Condition Date/Time Comment    Admit Stable Sun Aug 22, 2021 10:57 AM Case was discussed with FUENTES and the patient's admission status was agreed to be Admission Status: observation status to the service of Dr Brandon Arellano   Follow-up Information    None         Patient's Medications   Discharge Prescriptions    No medications on file     No discharge procedures on file      PDMP Review     None          ED Provider  Electronically Signed by           Chari Forrest DO  08/22/21 0528

## 2021-08-22 NOTE — H&P
[facility] [facility] [place] 1960, 57 y o  male MRN: [id number]  Unit/Bed#: ED 08 Encounter: [id number]  Primary Care Provider: No primary care provider on file  Date and time admitted to hospital: 8/22/2021  7:25 AM    * Chest pain  Assessment & Plan  Presented with substernal chest pain  Initial troponins x2 negative  Recent echo showed no acute abnormalities  Will consult Cardiology for possible stress test given significant risk factors  Check 1 more troponin  EKG showed no signs of acute ischemia    Kidney stone  Assessment & Plan    patient reports several weeks of gross hematuria  CT scan revealed Distended bladder containing multiple large dependent calculi measuring up to 2 4 cm  Urinary retention protocol  Will have Urology evaluate for possible CBI versus intervention  Will place on fluids for now    Class 2 obesity due to excess calories without serious comorbidity with body mass index (BMI) of 36 0 to 36 9 in adult  Assessment & Plan  Diet lifestyle modifications    CKD (chronic kidney disease)  Assessment & Plan  Lab Results   Component Value Date    EGFR 57 08/22/2021    EGFR 50 05/01/2021    EGFR 49 04/30/2021    CREATININE 1 32 (H) 08/22/2021    CREATININE 1 49 (H) 05/01/2021    CREATININE 1 52 (H) 04/30/2021   Creatinine at baseline  Continue monitor    Essential hypertension  Assessment & Plan  BP currently controlled  Continue home amlodipine    Type 2 diabetes mellitus with kidney complication, with long-term current use of insulin (HCC)  Assessment & Plan  Lab Results   Component Value Date    HGBA1C 10 5 (H) 04/28/2021       No results for input(s): POCGLU in the last 72 hours      Blood Sugar Average: Last 72 hrs:   continue home insulin  Sliding scale insulin        VTE Prophylaxis: Pharmacologic VTE Prophylaxis contraindicated due to Gross hematuria  / sequential compression device   Code Status:  Full code  POLST: There is no POLST form on file for this patient (pre-hospital)  Discussion with family: pt    Anticipated Length of Stay:  Patient will be admitted on an Observation basis with an anticipated length of stay of  > 2 midnights  Justification for Hospital Stay:  Chest pain    Total Time for Visit, including Counseling / Coordination of Care: 60 minutes  Greater than 50% of this total time spent on direct patient counseling and coordination of care  Chief Complaint:   Chest pain    History of Present Illness:    Benitez Nielson is a 57 y o  male past medical history significant hypertension, type 2 diabetes, hyperlipidemia, coronary artery disease status post PCI 7/16/2021 who initially presented with intermittent chest pain  Patient reports chest pain the past 1-2 weeks worse with exertion  He also reports gross hematuria for the past 1-2 weeks as well  Denies any other acute complaints  Denies palpitations, abdominal pain, nausea, vomiting, diarrhea, constipation, dysuria, headaches, numbness, weakness, cough or any other symptoms  Review of Systems:    Review of Systems   Constitutional: Negative for appetite change, chills, diaphoresis, fatigue, fever and unexpected weight change  HENT: Negative for congestion, rhinorrhea and sore throat  Eyes: Negative for photophobia and visual disturbance  Respiratory: Positive for shortness of breath  Negative for cough and wheezing  Cardiovascular: Positive for chest pain  Negative for palpitations and leg swelling  Gastrointestinal: Negative for abdominal pain, anal bleeding, blood in stool, constipation, diarrhea, nausea and vomiting  Genitourinary: Positive for hematuria  Negative for decreased urine volume, difficulty urinating, dysuria, flank pain, frequency and urgency  Musculoskeletal: Negative for arthralgias, back pain, joint swelling and myalgias  Skin: Negative for color change and rash     Neurological: Negative for dizziness, seizures, facial asymmetry, speech difficulty, numbness and headaches  Psychiatric/Behavioral: Negative for agitation, confusion and decreased concentration  The patient is not nervous/anxious  Past Medical and Surgical History:     Past Medical History:   Diagnosis Date    Diabetes mellitus ([facility] [address] )     Hypertension        No past surgical history on file  Meds/Allergies:    Prior to Admission medications    Medication Sig Start Date End Date Taking? Authorizing Provider   Alcohol Swabs 70 % PADS May substitute brand based on insurance coverage  Check glucose ACHS  5/1/21   ALINA Gibbs   amLODIPine (NORVASC) 5 mg tablet Take 1 tablet (5 mg total) by mouth daily 5/1/21 5/31/21  ALINA Gibbs   ascorbic acid (VITAMIN C) 1000 MG tablet Take 1 tablet (1,000 mg total) by mouth every 12 (twelve) hours for 6 doses 5/1/21 5/4/21  ALINA Gibbs   benzonatate (TESSALON PERLES) 100 mg capsule Take 1 capsule (100 mg total) by mouth 3 (three) times a day as needed for cough 5/1/21   ALINA Gibbs   cholecalciferol (VITAMIN D3) 1,000 units tablet Take 2 tablets (2,000 Units total) by mouth daily 5/1/21 5/31/21  ALINA Gibbs   glucose blood (FREESTYLE TEST STRIPS) test strip May substitute brand based on insurance coverage  Check glucose ACHS  5/1/21   ALINA Gibbs   glucose monitoring kit (FREESTYLE) monitoring kit May substitute brand based on insurance coverage  Check glucose ACHS  5/1/21   ALINA Gibbs   Insulin Degludec (TRESIBA FLEXTOUCH SC) Inject 120 mg under the skin daily    Historical Provider, MD   Lancets (freestyle) lancets May substitute brand based on insurance coverage  Check glucose ACHS  5/1/21   ALINA Gibbs     I have reviewed home medications with patient personally      Allergies: No Known Allergies    Social History:     Marital Status:      Patient Pre-hospital Living Situation: home  Patient Pre-hospital Level of Mobility: independent  Patient Pre-hospital Diet Restrictions:  Diabetic  Substance Use History:   Social History     Substance and Sexual Activity   Alcohol Use Yes     Social History     Tobacco Use   Smoking Status Never Smoker   Smokeless Tobacco Never Used     Social History     Substance and Sexual Activity   Drug Use Never       Family History:    No family history on file  Physical Exam:     Vitals:   Blood Pressure: 147/90 (08/22/21 1113)  Pulse: 69 (08/22/21 1113)  Temperature: 97 8 °F (36 6 °C) (08/22/21 0729)  Temp Source: Temporal (08/22/21 0729)  Respirations: 17 (08/22/21 1113)  Height: 5' 9" (175 3 cm) (08/22/21 0728)  Weight - Scale: 107 kg (235 lb) (08/22/21 0728)  SpO2: 99 % (08/22/21 1113)    Physical Exam  Constitutional:       General: He is not in acute distress  Appearance: He is well-developed  He is not diaphoretic  HENT:      Head: Normocephalic and atraumatic  Nose: Nose normal       Mouth/Throat:      Pharynx: No oropharyngeal exudate  Eyes:      General: No scleral icterus  Conjunctiva/sclera: Conjunctivae normal    Cardiovascular:      Rate and Rhythm: Normal rate and regular rhythm  Heart sounds: Normal heart sounds  No murmur heard  No friction rub  No gallop  Pulmonary:      Effort: Pulmonary effort is normal  No respiratory distress  Breath sounds: Normal breath sounds  No wheezing or rales  Chest:      Chest wall: No tenderness  Abdominal:      General: Bowel sounds are normal  There is no distension  Palpations: Abdomen is soft  Tenderness: There is no abdominal tenderness  There is no guarding  Musculoskeletal:         General: No tenderness or deformity  Normal range of motion  Cervical back: Normal range of motion and neck supple  Skin:     General: Skin is warm and dry  Findings: No erythema  Neurological:      Mental Status: He is alert  Mental status is at baseline           Additional Data:     Lab Results: I have personally reviewed pertinent reports  Results from last 7 days   Lab Units 08/22/21  0807   WBC Thousand/uL 9 35   HEMOGLOBIN g/dL 13 0   HEMATOCRIT % 37 8   PLATELETS Thousands/uL 291   NEUTROS PCT % 63   LYMPHS PCT % 27   MONOS PCT % 7   EOS PCT % 3     Results from last 7 days   Lab Units 08/22/21  0807   SODIUM mmol/L 146*   POTASSIUM mmol/L 3 7   CHLORIDE mmol/L 110*   CO2 mmol/L 22   BUN mg/dL 15   CREATININE mg/dL 1 32*   ANION GAP mmol/L 14*   CALCIUM mg/dL 7 9*   ALBUMIN g/dL 3 1*   TOTAL BILIRUBIN mg/dL 0 41   ALK PHOS U/L 59   ALT U/L 28   AST U/L 19   GLUCOSE RANDOM mg/dL 96     Results from last 7 days   Lab Units 08/22/21  0807   INR  1 03                   Imaging: I have personally reviewed pertinent reports  CT abdomen pelvis with contrast   Final Result by Gera Hanson MD (08/22 1012)      1  Distended bladder containing multiple large dependent calculi measuring up to 2 4 cm  Mild diffuse bladder wall thickening is nonspecific and may be due to cystitis and/or chronic outlet obstruction from prostatomegaly  2   No hydroureteronephrosis  2 mm nonobstructing right renal calculi  3   Splenomegaly  Workstation performed: [id number]         XR chest 2 views   ED Interpretation by Sadaf Junior DO (08/22 0818)   No acute abnormality in the chest           EKG, Pathology, and Other Studies Reviewed on Admission:   · EKG: reviewed    Allscripts / Epic Records Reviewed: Yes     ** Please Note: This note has been constructed using a voice recognition system   **

## 2021-08-22 NOTE — ASSESSMENT & PLAN NOTE
Presented with substernal chest pain  Initial troponins x2 negative  Recent echo showed no acute abnormalities  Will consult Cardiology for possible stress test given significant risk factors  Check 1 more troponin  EKG showed no signs of acute ischemia

## 2021-08-23 VITALS
RESPIRATION RATE: 18 BRPM | HEIGHT: 69 IN | SYSTOLIC BLOOD PRESSURE: 166 MMHG | DIASTOLIC BLOOD PRESSURE: 86 MMHG | WEIGHT: 235 LBS | OXYGEN SATURATION: 98 % | BODY MASS INDEX: 34.8 KG/M2 | HEART RATE: 67 BPM | TEMPERATURE: 98.1 F

## 2021-08-23 LAB
ANION GAP SERPL CALCULATED.3IONS-SCNC: 11 MMOL/L (ref 4–13)
ATRIAL RATE: 61 BPM
BASOPHILS # BLD AUTO: 0.05 THOUSANDS/ΜL (ref 0–0.1)
BASOPHILS NFR BLD AUTO: 1 % (ref 0–1)
BUN SERPL-MCNC: 14 MG/DL (ref 5–25)
CALCIUM SERPL-MCNC: 8.2 MG/DL (ref 8.3–10.1)
CHLORIDE SERPL-SCNC: 112 MMOL/L (ref 100–108)
CO2 SERPL-SCNC: 21 MMOL/L (ref 21–32)
CREAT SERPL-MCNC: 1.34 MG/DL (ref 0.6–1.3)
EOSINOPHIL # BLD AUTO: 0.3 THOUSAND/ΜL (ref 0–0.61)
EOSINOPHIL NFR BLD AUTO: 3 % (ref 0–6)
ERYTHROCYTE [DISTWIDTH] IN BLOOD BY AUTOMATED COUNT: 13.4 % (ref 11.6–15.1)
GFR SERPL CREATININE-BSD FRML MDRD: 56 ML/MIN/1.73SQ M
GLUCOSE SERPL-MCNC: 151 MG/DL (ref 65–140)
GLUCOSE SERPL-MCNC: 84 MG/DL (ref 65–140)
GLUCOSE SERPL-MCNC: 90 MG/DL (ref 65–140)
HCT VFR BLD AUTO: 36.5 % (ref 36.5–49.3)
HGB BLD-MCNC: 12.8 G/DL (ref 12–17)
IMM GRANULOCYTES # BLD AUTO: 0.04 THOUSAND/UL (ref 0–0.2)
IMM GRANULOCYTES NFR BLD AUTO: 1 % (ref 0–2)
LYMPHOCYTES # BLD AUTO: 2.18 THOUSANDS/ΜL (ref 0.6–4.47)
LYMPHOCYTES NFR BLD AUTO: 25 % (ref 14–44)
MCH RBC QN AUTO: 29.4 PG (ref 26.8–34.3)
MCHC RBC AUTO-ENTMCNC: 35.1 G/DL (ref 31.4–37.4)
MCV RBC AUTO: 84 FL (ref 82–98)
MONOCYTES # BLD AUTO: 0.69 THOUSAND/ΜL (ref 0.17–1.22)
MONOCYTES NFR BLD AUTO: 8 % (ref 4–12)
NEUTROPHILS # BLD AUTO: 5.51 THOUSANDS/ΜL (ref 1.85–7.62)
NEUTS SEG NFR BLD AUTO: 62 % (ref 43–75)
NRBC BLD AUTO-RTO: 0 /100 WBCS
P AXIS: 46 DEGREES
PLATELET # BLD AUTO: 292 THOUSANDS/UL (ref 149–390)
PMV BLD AUTO: 9.6 FL (ref 8.9–12.7)
POTASSIUM SERPL-SCNC: 3.7 MMOL/L (ref 3.5–5.3)
PR INTERVAL: 144 MS
QRS AXIS: 3 DEGREES
QRSD INTERVAL: 98 MS
QT INTERVAL: 446 MS
QTC INTERVAL: 448 MS
RBC # BLD AUTO: 4.35 MILLION/UL (ref 3.88–5.62)
SODIUM SERPL-SCNC: 144 MMOL/L (ref 136–145)
T WAVE AXIS: -22 DEGREES
VENTRICULAR RATE: 61 BPM
WBC # BLD AUTO: 8.77 THOUSAND/UL (ref 4.31–10.16)

## 2021-08-23 PROCEDURE — 99204 OFFICE O/P NEW MOD 45 MIN: CPT | Performed by: NURSE PRACTITIONER

## 2021-08-23 PROCEDURE — 80048 BASIC METABOLIC PNL TOTAL CA: CPT | Performed by: INTERNAL MEDICINE

## 2021-08-23 PROCEDURE — NC001 PR NO CHARGE: Performed by: NURSE PRACTITIONER

## 2021-08-23 PROCEDURE — 99217 PR OBSERVATION CARE DISCHARGE MANAGEMENT: CPT | Performed by: FAMILY MEDICINE

## 2021-08-23 PROCEDURE — 36415 COLL VENOUS BLD VENIPUNCTURE: CPT | Performed by: INTERNAL MEDICINE

## 2021-08-23 PROCEDURE — 85025 COMPLETE CBC W/AUTO DIFF WBC: CPT | Performed by: INTERNAL MEDICINE

## 2021-08-23 PROCEDURE — 99204 OFFICE O/P NEW MOD 45 MIN: CPT | Performed by: INTERNAL MEDICINE

## 2021-08-23 PROCEDURE — 82948 REAGENT STRIP/BLOOD GLUCOSE: CPT

## 2021-08-23 RX ORDER — TAMSULOSIN HYDROCHLORIDE 0.4 MG/1
0.4 CAPSULE ORAL
Status: DISCONTINUED | OUTPATIENT
Start: 2021-08-23 | End: 2021-08-23 | Stop reason: HOSPADM

## 2021-08-23 RX ORDER — CLOPIDOGREL BISULFATE 75 MG/1
75 TABLET ORAL DAILY
Qty: 90 TABLET | Refills: 0 | Status: SHIPPED | OUTPATIENT
Start: 2021-08-24 | End: 2021-11-22

## 2021-08-23 RX ORDER — NITROGLYCERIN 0.4 MG/1
0.4 TABLET SUBLINGUAL
Qty: 10 TABLET | Refills: 0 | Status: SHIPPED | OUTPATIENT
Start: 2021-08-23 | End: 2021-09-02

## 2021-08-23 RX ORDER — ASPIRIN 81 MG/1
81 TABLET ORAL DAILY
Status: DISCONTINUED | OUTPATIENT
Start: 2021-08-23 | End: 2021-08-23 | Stop reason: HOSPADM

## 2021-08-23 RX ORDER — CLOPIDOGREL BISULFATE 75 MG/1
75 TABLET ORAL DAILY
Status: DISCONTINUED | OUTPATIENT
Start: 2021-08-24 | End: 2021-08-23 | Stop reason: HOSPADM

## 2021-08-23 RX ORDER — TAMSULOSIN HYDROCHLORIDE 0.4 MG/1
0.4 CAPSULE ORAL
Qty: 30 CAPSULE | Refills: 0 | Status: SHIPPED | OUTPATIENT
Start: 2021-08-23 | End: 2021-09-22

## 2021-08-23 RX ORDER — CLOPIDOGREL BISULFATE 75 MG/1
300 TABLET ORAL ONCE
Status: COMPLETED | OUTPATIENT
Start: 2021-08-23 | End: 2021-08-23

## 2021-08-23 RX ORDER — AMLODIPINE BESYLATE 5 MG/1
5 TABLET ORAL DAILY
Status: DISCONTINUED | OUTPATIENT
Start: 2021-08-23 | End: 2021-08-23 | Stop reason: HOSPADM

## 2021-08-23 RX ADMIN — ASPIRIN 81 MG: 81 TABLET, COATED ORAL at 14:01

## 2021-08-23 RX ADMIN — AMLODIPINE BESYLATE 5 MG: 5 TABLET ORAL at 08:08

## 2021-08-23 RX ADMIN — CLOPIDOGREL BISULFATE 300 MG: 75 TABLET ORAL at 14:01

## 2021-08-23 NOTE — DISCHARGE INSTRUCTIONS
Bladder Stones   WHAT YOU NEED TO KNOW:   A bladder stone is a hard substance in your bladder  Bladder stones may form in your bladder, or they may first form in your kidney and then travel to your bladder  Bladder stones are made up of minerals such as calcium, uric acid, oxalate, and phosphate  You may have one or more bladder stone  DISCHARGE INSTRUCTIONS:   Seek care immediately if:   · You have severe pain that does not get better with medicine  · You are vomiting  · You have a fever  Contact your healthcare provider if:   · Your symptoms do not get better, or they get worse  · You have trouble urinating  · You have questions or concerns about your condition or care  Drink plenty of liquids: Your healthcare provider may tell you to drink up to 8 (eight-ounce) cups of liquids each day  This helps flush out the stones when you urinate  It may also help prevent bladder stones from forming again  Water is the best liquid to drink  Follow up with your healthcare provider as directed: You may need to return for more tests or treatment  Write down your questions so you remember to ask them during your visits  © Copyright [employer] 2021 Information is for End User's use only and may not be sold, redistributed or otherwise used for commercial purposes  All illustrations and images included in CareNotes® are the copyrighted property of A D A M , Inc  or Amy Valentino  The above information is an  only  It is not intended as medical advice for individual conditions or treatments  Talk to your doctor, nurse or pharmacist before following any medical regimen to see if it is safe and effective for you

## 2021-08-23 NOTE — ASSESSMENT & PLAN NOTE
· Presented with substernal chest pain  · Troponin x3 less than 0 02/proBNP 8 4 0  · Patient reportedly has underlying history of CAD status post stent placement on 07/16/2021  He has not been taking his antiplatelet agents at home  · Cardiology did see the patient and recommended nuclear stress test   Patient unwilling to stay and signed out against medical advice  · In the meantime he was loaded with Plavix during hospitalization with 300 mg Plavix and a prescription for 75 mg oral Plavix daily (90 tablets) was sent to his pharmacy  Also recommended patient continue aspirin/statin at home  · Continue with p r n  Nitro at discharge  No beta-blocker recommended given episodes of bradycardia on telemetry  Yes

## 2021-08-23 NOTE — CONSULTS
UROLOGY CONSULTATION NOTE     Patient Identifiers: Memo Mckenna (MRN [id number])  Service Requesting Consultation: Isatu Leija MD    Service Providing Consultation:  Urology, ALINA Parr    Date of Service: 8/23/2021  Inpatient consult to Urology  Consult performed by: ALINA Parr  Consult ordered by: Stephon Marie MD          Reason for Consultation: UTI    ASSESSMENT:     57 y o  old male with chronic kidney disease, prostatomegaly, urinary retention, possibly chronic bladder outlet obstruction without reflux hydronephrosis, multiple large dependent bladder calculi measuring up to 2 4 cm--sequelae of longstanding poor bladder emptying  PLAN:   Continue medical optimization including glycemic control and workup for primary chief complaint of chest pain  Fortunately, urine analysis did not demonstrate infection  Patient is nontoxic appearing  Renal function within baseline  Monitor voiding with serial PVRs  Nursing staff to document in flow sheet  Patient voiding at this time volitionally in quantity sufficient amounts  Bedside urinal contains grossly clear yellow urine  Our service should be contacted for volumes exceeding 350 mL x2 for Avendaño catheter orders  In the interim, initiate Flomax  Patient will require full BPH workup, cystoscopic examination, transrectal ultrasound and discussion for possible cysto litholapaxy electively  Unfortunately, 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  He will not be in [place] long and will be moving along to [facility] within the next few days  Urged patient, to follow up with urologist when he is settled  History of Present Illness:     Memo Mckenna is a 57 y o  old with a history of significant nephrolithiasis, episodes of spontaneous expulsion [place] mother in [place] and plans to leave for next chopping in [facility]  Urologic records could not be obtained from care everywhere  However, Last previous PSA on record in 2019 was 3 49  Patient reports an onset of gross hematuria and suspects passing stones  He has a history of recent stent placement but is not anticoagulated  He is afebrile, hemodynamically stable and moderately distracted  His renal function is at baseline  Hemoglobin 12 8 without leukocytosis on recent CBC  Urinalysis demonstrated innumerable RBCs  Trace wbc's and no bacteria seen  CT of the abdomen and pelvis demonstrates 4 3 cm simple appearing right renal cyst, 2 mm nonobstructing right lower pole calculi, no upper tract obstruction or lymphadenopathy  There were multiple large dependent bladder calculi measuring up to 2 4 cm with bladder overdistention  Patient denies flank, abdominal or suprapubic pain  Logic consultation requested for further management recommendations  Past Medical, Past Surgical History:     Past Medical History:   Diagnosis Date    Diabetes mellitus ([facility] [address] )     Hypertension    :    No past surgical history on file :    Medications, Allergies:     Current Facility-Administered Medications   Medication Dose Route Frequency    acetaminophen (TYLENOL) tablet 650 mg  650 mg Oral Q6H PRN    amLODIPine (NORVASC) tablet 5 mg  5 mg Oral Daily    insulin lispro (HumaLOG) 100 units/mL subcutaneous injection 1-5 Units  1-5 Units Subcutaneous HS    insulin lispro (HumaLOG) 100 units/mL subcutaneous injection 1-6 Units  1-6 Units Subcutaneous TID AC    nitroglycerin (NITROSTAT) SL tablet 0 4 mg  0 4 mg Sublingual Q5 Min PRN    sodium chloride 0 9 % infusion  125 mL/hr Intravenous Continuous       Allergies:  No Known Allergies:    Social and Family History:   Social History:   Social History     Tobacco Use    Smoking status: Never Smoker    Smokeless tobacco: Never Used   Substance Use Topics    Alcohol use: Yes    Drug use: Never         Social History     Tobacco Use   Smoking Status Never Smoker   Smokeless Tobacco Never Used       Family History:  No family history on file :     Review of Systems:     Review of Systems - History obtained from chart review and the patient  General ROS: negative for - chills or fever  Respiratory ROS: no cough, shortness of breath, or wheezing  Cardiovascular ROS: positive for - chest pain  Gastrointestinal ROS: no abdominal pain, change in bowel habits, or black or bloody stools  Genito-Urinary ROS: no dysuria, trouble voiding, or hematuria  Neurological ROS: no TIA or stroke symptoms    All other systems queried were negative  Physical Exam:     /86 (BP Location: Right arm)   Pulse 59   Temp 98 1 °F (36 7 °C) (Oral)   Resp 20   Ht 5' 9" (1 753 m)   Wt 107 kg (235 lb)   SpO2 95%   BMI 34 70 kg/m² Temp (24hrs), Av 1 °F (36 7 °C), Min:98 1 °F (36 7 °C), Max:98 1 °F (36 7 °C)  current; Temperature: 98 1 °F (36 7 °C)  I/O last 24 hours:   In: 12 [P O :960]  Out: 1350 [Urine:1350]    General appearance: alert and oriented, in no acute distress, appears stated age, cooperative, no distress and moderately obese  Head: Normocephalic, without obvious abnormality, atraumatic  Neck: no adenopathy, no carotid bruit, no JVD, supple, symmetrical, trachea midline and thyroid not enlarged, symmetric, no tenderness/mass/nodules  Lungs: diminished breath sounds  Heart: regular rate and rhythm, S1, S2 normal, no murmur, click, rub or gallop  Abdomen: soft, non-tender; bowel sounds normal; no masses,  no organomegaly  Extremities: extremities normal, warm and well-perfused; no cyanosis, clubbing, or edema  Pulses: 2+ and symmetric  Neurologic: Grossly normal  Bedside urinal contains grossly clear yellow your   *urine      Labs:     Lab Results   Component Value Date    HGB 12 8 2021    HCT 36 5 2021    WBC 8 77 2021     2021   ]    Lab Results   Component Value Date    K 3 7 2021  (H) 08/23/2021    CO2 21 08/23/2021    BUN 14 08/23/2021    CREATININE 1 34 (H) 08/23/2021    CALCIUM 8 2 (L) 08/23/2021   ]    Imaging:   I personally reviewed the images and report of the following studies, and reviewed them with the patient:    CT Abdomen: see below      CT abdomen pelvis with contrast [[id number]] Collected: 08/22/21 0953   Order Status: Completed Updated: 08/22/21 1013   Narrative:     CT ABDOMEN AND PELVIS WITH IV CONTRAST     INDICATION:  gross hematuria  COMPARISON:  None  TECHNIQUE:  CT examination of the abdomen and pelvis was performed  Axial, sagittal, and coronal 2D reformatted images were created from the source data and submitted for interpretation  Radiation dose length product (DLP) for this visit: 077 [id number] 4127 mGy-cm    This examination, like all CT scans performed in the [facility], was performed utilizing techniques to minimize radiation dose exposure, including the use of iterative   reconstruction and automated exposure control  IV Contrast:  100 mL of iohexol (OMNIPAQUE)   Enteric contrast was not administered  FINDINGS:     ABDOMEN     LOWER CHEST:  Coronary artery calcifications  LIVER/BILIARY TREE:  Scattered subcentimeter hepatic hypodensities, too small to characterize   Otherwise unremarkable   No biliary dilatation  GALLBLADDER:  No calcified gallstones  No pericholecystic inflammatory change  SPLEEN:  Spleen is enlarged measuring 14 5 cm  PANCREAS:  Unremarkable  ADRENAL GLANDS:  Unremarkable  KIDNEYS/URETERS:  4 3 cm simple-appearing right renal cyst   Bilateral subcentimeter renal hypodensities, too small to characterize   No hydronephrosis   2 mm nonobstructing calculi in the right lower pole   No suspicious renal mass        STOMACH AND BOWEL:  No disproportionate dilatation of small or large bowel  APPENDIX:  Normal appendix       ABDOMINOPELVIC CAVITY:  No ascites   No pneumoperitoneum   No lymphadenopathy  VESSELS:  No abdominal aortic aneurysm  PELVIS     REPRODUCTIVE ORGANS:  Prostatomegaly  URINARY BLADDER:  Multiple large dependent bladder calculi measuring up to 2 4 cm (series 601 image 110)   The bladder is distended   Mild diffuse bladder wall thickening is nonspecific and may be due to cystitis and/or chronic outlet obstruction  ABDOMINAL WALL/INGUINAL REGIONS:  Small fat-containing right inguinal hernia  OSSEOUS STRUCTURES:  No acute fracture or destructive osseous lesion   Degenerative changes of the spine  Impression:       1   Distended bladder containing multiple large dependent calculi measuring up to 2 4 cm   Mild diffuse bladder wall thickening is nonspecific and may be due to cystitis and/or chronic outlet obstruction from prostatomegaly          2   No hydroureteronephrosis   2 mm nonobstructing right renal calculi  3   Splenomegaly  Workstation performed: [id number]            Thank you for allowing me to participate in this patients care  Please do not hesitate to call with any additional questions    ALINA Gonsales

## 2021-08-23 NOTE — ASSESSMENT & PLAN NOTE
·  patient reports several weeks of gross hematuria  · CT scan revealed Distended bladder containing multiple large dependent calculi measuring up to 2 4 cm  · Patient passed multiple kidney stones overnight which was collected in a container by his bedside  · Seen by Urology, appreciate input  · Recommended initiating Flomax  · Patient needs outpatient full BPH workup (cystoscopy examination, transrectal ultrasound/possible cysto litholapaxy )  · Unfortunately patient plans to move to [facility] within the next few days and hence was urged to follow-up with urology over there

## 2021-08-23 NOTE — ASSESSMENT & PLAN NOTE
Lab Results   Component Value Date    HGBA1C 10 5 (H) 04/28/2021       Recent Labs     08/22/21  1634 08/22/21  2203 08/23/21  0745 08/23/21  1123   POCGLU 163* 131 90 151*       Blood Sugar Average: Last 72 hrs:  (P) 134   continue home insulin  Sliding scale insulin

## 2021-08-23 NOTE — ASSESSMENT & PLAN NOTE
Lab Results   Component Value Date    EGFR 56 08/23/2021    EGFR 57 08/22/2021    EGFR 50 05/01/2021    CREATININE 1 34 (H) 08/23/2021    CREATININE 1 32 (H) 08/22/2021    CREATININE 1 49 (H) 05/01/2021   Creatinine at baseline

## 2021-08-23 NOTE — CONSULTS
Consultation - Cardiology   Leonel Araujo 57 y o  male MRN: [id number]  Unit/Bed#: ED 08 Encounter: [id number]  08/23/21  11:14 AM    Assessment/ Plan:    1  Chest pain    · Intermittent and exertional, in the setting of recent PCI (July 21 ), not compliant with dual antiplatelet  · Troponin x3-negative  · EKG normal sinus rhythm, nonspecific T-wave abnormality  · NT proBNP -840  · Chest x-ray-unremarkable  · Continue with aspirin, give loading dose of Plavix followed by Plavix 75 mg from a m  · Continue amlodipine, resume lisinopril once BELKIS has resolved  · Not on beta-blocker because of bradycardia  · Discussed in detail, need of repeat exercise stress test to rule out ischemia  However patient adamant of doing it outpatient  Risk and benefits explained in detail  He verbalizes understanding  · Outpatient cardiology follow-up recommended    2  Hypertension    · Home lisinopril on hold due to BELKIS, continue with amlodipine 5 mg daily  · Routine vitals as per unit protocol      3  Coronary artery disease    · With recent PCI in July 21 in [place] with SANTOSH x 3? 2?, no clinical data available for review  · Continue with aspirin, Plavix, statin, lisinopril  · Not on beta-blocker because of bradycardia      4  Type 2 diabetes mellitus    · Management as per primary      5  CKD stage 3    · Current creatinine 1 34, continue to monitor, avoid nephrotoxic agents    6   Kidney stones     · Management as per primary team /Urology      History of Present Illness   Physician Requesting Consult: Maya Champion MD  Reason for Consult / Principal Problem:  Chest pain  HPI: Leonel Araujo is a 57y o  year old male with past medical history pertinent for hypertension, morbid obesity, type 2 diabetes, CKD stage 3, recent history of COVID-19 in April 21, also recent history of coronary artery disease status post PCI x 3 (July 16/21, occurred in [place], no records in EMR available for review) presented to the ED with intermittent pressure-like chest pain for 2 weeks  Patient stated that it is a feeling of heaviness, more pronounced on exertion, nonreproducible and nonradiating  Patient stated that when he got sublingual nitroglycerin in ED, his pain got relieved  Patient also mentioned that he has not been taking Plavix, as it was not prescribed at the time of discharge in [place]  However he has been compliant with aspirin and lisinopril as well as atorvastatin  Cardiology was consulted to further comment on patient's chest pain   Inpatient consult to Cardiology  Consult performed by: Bella Lebron MD  Consult ordered by: Joe Minaya MD        EKG:  Normal sinus rhythm, nonspecific T-wave abnormality    Review of Systems   Constitutional: Positive for activity change  Negative for chills, fatigue and unexpected weight change  HENT: Negative for congestion  Eyes: Negative for visual disturbance  Respiratory: Positive for chest tightness  Negative for cough, shortness of breath and wheezing  Cardiovascular: Positive for chest pain  Negative for palpitations and leg swelling  Gastrointestinal: Negative for abdominal pain, constipation, diarrhea, nausea and vomiting  Genitourinary: Positive for hematuria  Negative for dysuria and urgency  Musculoskeletal: Negative for back pain  Neurological: Negative for dizziness, weakness, light-headedness and headaches  Psychiatric/Behavioral: Negative for agitation and confusion  Historical Information   Past Medical History:   Diagnosis Date    Diabetes mellitus ([facility] [address] )     Hypertension      No past surgical history on file  Social History     Substance and Sexual Activity   Alcohol Use Yes     Social History     Substance and Sexual Activity   Drug Use Never     Social History     Tobacco Use   Smoking Status Never Smoker   Smokeless Tobacco Never Used       Family History: No family history on file      Meds/Allergies   current meds:   Current Facility-Administered Medications   Medication Dose Route Frequency    acetaminophen (TYLENOL) tablet 650 mg  650 mg Oral Q6H PRN    amLODIPine (NORVASC) tablet 5 mg  5 mg Oral Daily    insulin lispro (HumaLOG) 100 units/mL subcutaneous injection 1-5 Units  1-5 Units Subcutaneous HS    insulin lispro (HumaLOG) 100 units/mL subcutaneous injection 1-6 Units  1-6 Units Subcutaneous TID AC    nitroglycerin (NITROSTAT) SL tablet 0 4 mg  0 4 mg Sublingual Q5 Min PRN    sodium chloride 0 9 % infusion  125 mL/hr Intravenous Continuous    tamsulosin (FLOMAX) capsule 0 4 mg  0 4 mg Oral Daily With Dinner     No Known Allergies    Objective   Vitals: Blood pressure 166/86, pulse 59, temperature 98 1 °F (36 7 °C), temperature source Oral, resp  rate 20, height 5' 9" (1 753 m), weight 107 kg (235 lb), SpO2 95 %  , Body mass index is 34 7 kg/m² ,   Orthostatic Blood Pressures      Most Recent Value   Blood Pressure  166/86 filed at 08/23/2021 0300   Patient Position - Orthostatic VS  Sitting filed at 08/23/2021 1825          Systolic (33QJH), EDC:504 , Min:136 , SEN:927     Diastolic (74USR), BQU:80, Min:78, Max:86        Intake/Output Summary (Last 24 hours) at 8/23/2021 1114  Last data filed at 8/23/2021 0655  Gross per 24 hour   Intake 960 ml   Output 1350 ml   Net -390 ml       Invasive Devices     Peripheral Intravenous Line            Peripheral IV 08/22/21 Right Antecubital 1 day                    Physical Exam:  GEN: Alert and oriented x 3, in no acute distress  Obese  HEENT: Sclera anicteric, conjunctivae pink, mucous membranes moist  Oropharynx clear  NECK: Supple, no carotid bruits, no significant JVD  Trachea midline, no thyromegaly  HEART: Regular rhythm, normal S1 and S2, no murmurs, clicks, gallops or rubs  PMI nondisplaced, no thrills  LUNGS: Clear to auscultation bilaterally; no wheezes, rales, or rhonchi  No increased work of breathing or signs of respiratory distress     ABDOMEN: Soft, nontender, nondistended, normoactive bowel sounds  EXTREMITIES: Skin warm and well perfused, no clubbing, cyanosis, or edema  NEURO: No focal findings  Normal speech  Mood and affect normal    SKIN: Normal without suspicious lesions on exposed skin        Lab Results:     Troponins:   Results from last 7 days   Lab Units 08/22/21  1506 08/22/21  1109 08/22/21  0807   TROPONIN I ng/mL <0 02 <0 02 <0 02       CBC with diff:   Results from last 7 days   Lab Units 08/23/21  0701 08/22/21  0807   WBC Thousand/uL 8 77 9 35   HEMOGLOBIN g/dL 12 8 13 0   HEMATOCRIT % 36 5 37 8   MCV fL 84 84   PLATELETS Thousands/uL 292 291   MCH pg 29 4 28 8   MCHC g/dL 35 1 34 4   RDW % 13 4 13 2   MPV fL 9 6 9 5   NRBC AUTO /100 WBCs 0 0         CMP:   Results from last 7 days   Lab Units 08/23/21  0701 08/22/21  0807   POTASSIUM mmol/L 3 7 3 7   CHLORIDE mmol/L 112* 110*   CO2 mmol/L 21 22   BUN mg/dL 14 15   CREATININE mg/dL 1 34* 1 32*   CALCIUM mg/dL 8 2* 7 9*   AST U/L  --  19   ALT U/L  --  28   ALK PHOS U/L  --  59   EGFR ml/min/1 73sq m 56 57

## 2021-08-23 NOTE — UTILIZATION REVIEW
Initial Clinical Review    Admission: Date/Time/Statement:   Admission Orders (From admission, onward)     Ordered        08/22/21 1058  Place in Observation  Once                   Orders Placed This Encounter   Procedures    Place in Observation     Standing Status:   Standing     Number of Occurrences:   1     Order Specific Question:   Level of Care     Answer:   Med Surg [16]     Order Specific Question:   Bed request comments     Answer:   tele     ED Arrival Information     Expected Arrival Acuity    - 8/22/2021 07:23 Emergent         Means of arrival Escorted by Service Admission type    [facility] Emergency         Arrival complaint    CHEST PAIN        Chief Complaint   Patient presents with    Chest Pain     pt c/o intermittent cp for a week  Initial Presentation: 56 yo male to ED from home w/ intermittent CP for the past 1-2 weeks  Gross hematuria for the past 1-2 weeks   Admitted OBS status for serial trop , cardiology consult for possible stress test   CT revealed Distended bladder containing multiple large dependent calculi measuring up to 2 4 cm, consult urology , IVF   Cr 1 32 at baseline cont to monitor   HTN cont amlodipine   DM SSI and monitor  ED Triage Vitals   Temperature Pulse Respirations Blood Pressure SpO2   08/22/21 0729 08/22/21 0728 08/22/21 0728 08/22/21 0729 08/22/21 0728   97 8 °F (36 6 °C) 58 19 (!) 205/107 98 %      Temp Source Heart Rate Source Patient Position - Orthostatic VS BP Location FiO2 (%)   08/22/21 0729 08/22/21 0728 08/22/21 0728 08/22/21 0728 --   Temporal Monitor Sitting Right arm       Pain Score       08/22/21 0728       5          Wt Readings from Last 1 Encounters:   08/22/21 107 kg (235 lb)     Additional Vital Signs:   Pertinent Labs/Diagnostic Test Results:   8/22 CT abd   1  Distended bladder containing multiple large dependent calculi measuring up to 2 4 cm    Mild diffuse bladder wall thickening is nonspecific and may be due to cystitis and/or chronic outlet obstruction from prostatomegaly            2  No hydroureteronephrosis  2 mm nonobstructing right renal calculi        3   Splenomegaly       8/22 EKG ECG rate:  56     ECG rate assessment: bradycardic     Rhythm:     Rhythm: sinus bradycardia     Ectopy:     Ectopy: none     QRS:     QRS axis:  Normal     QRS intervals:  Normal   Conduction:     Conduction: normal     ST segments:     ST segments:  Normal   T waves:     T waves: non-specific and inverted       Inverted:  V5 and V6  8/22 CXR - wnl   Results from last 7 days   Lab Units 08/22/21  0807   WBC Thousand/uL 9 35   HEMOGLOBIN g/dL 13 0   HEMATOCRIT % 37 8   PLATELETS Thousands/uL 291   NEUTROS ABS Thousands/µL 5 82     Results from last 7 days   Lab Units 08/22/21  0807   SODIUM mmol/L 146*   POTASSIUM mmol/L 3 7   CHLORIDE mmol/L 110*   CO2 mmol/L 22   ANION GAP mmol/L 14*   BUN mg/dL 15   CREATININE mg/dL 1 32*   EGFR ml/min/1 73sq m 57   CALCIUM mg/dL 7 9*   MAGNESIUM mg/dL 1 5*     Results from last 7 days   Lab Units 08/22/21  0807   AST U/L 19   ALT U/L 28   ALK PHOS U/L 59   TOTAL PROTEIN g/dL 6 2*   ALBUMIN g/dL 3 1*   TOTAL BILIRUBIN mg/dL 0 41   BILIRUBIN DIRECT mg/dL 0 09     Results from last 7 days   Lab Units 08/22/21  2203 08/22/21  1634 08/22/21  1314   POC GLUCOSE mg/dl 131 163* 135     Results from last 7 days   Lab Units 08/22/21  0807   GLUCOSE RANDOM mg/dL 96       Results from last 7 days   Lab Units 08/22/21  1506 08/22/21  1109 08/22/21  0807   TROPONIN I ng/mL <0 02 <0 02 <0 02     Results from last 7 days   Lab Units 08/22/21  0807   D-DIMER QUANTITATIVE ug/ml FEU 0 37     Results from last 7 days   Lab Units 08/22/21  0807   PROTIME seconds 13 0   INR  1 03   PTT seconds 28     Results from last 7 days   Lab Units 08/22/21  0807   NT-PRO BNP pg/mL 840*       Results from last 7 days   Lab Units 08/22/21  1246   CLARITY UA  Clear   COLOR UA  Yellow   SPEC GRAV UA  1 015   PH UA  6 0 GLUCOSE UA mg/dl 100 (1/10%)*   KETONES UA mg/dl Negative   BLOOD UA  Large*   PROTEIN UA mg/dl 30 (1+)*   NITRITE UA  Negative   BILIRUBIN UA  Negative   UROBILINOGEN UA E U /dl 0 2   LEUKOCYTES UA  Negative   WBC UA /hpf 1-2   RBC UA /hpf Innumerable*   BACTERIA UA /hpf None Seen   EPITHELIAL CELLS WET PREP /hpf Occasional       ED Treatment:   Medication Administration from 08/22/2021 0723 to 08/23/2021 0736       Date/Time Order Dose Route Action     08/22/2021 0809 sodium chloride 0 9 % bolus 500 mL 500 mL Intravenous New Bag     08/22/2021 8535 aspirin chewable tablet 243 mg 243 mg Oral Given     08/22/2021 7911 nitroglycerin (NITROSTAT) SL tablet 0 4 mg 0 4 mg Sublingual Given     08/22/2021 1059 nitroglycerin (NITRO-BID) 2 % TD ointment 1 inch 1 inch Topical Given     08/22/2021 0901 magnesium sulfate 2 g/50 mL IVPB (premix) 2 g 2 g Intravenous New Bag     08/22/2021 1702 insulin lispro (HumaLOG) 100 units/mL subcutaneous injection 1-6 Units 1 Units Subcutaneous Given     08/22/2021 1244 sodium chloride 0 9 % infusion 125 mL/hr Intravenous New Bag        Past Medical History:   Diagnosis Date    Diabetes mellitus ([facility] [address] )     Hypertension      Present on Admission:   Chest pain   Essential hypertension   CKD (chronic kidney disease)      Admitting Diagnosis: Chest pain [R07 9]  Age/Sex: 57 y o  male  Admission Orders:  Scheduled Medications:  amLODIPine, 5 mg, Oral, Daily  insulin lispro, 1-5 Units, Subcutaneous, HS  insulin lispro, 1-6 Units, Subcutaneous, TID AC      Continuous IV Infusions:  sodium chloride, 125 mL/hr, Intravenous, Continuous      PRN Meds:  acetaminophen, 650 mg, Oral, Q6H PRN  nitroglycerin, 0 4 mg, Sublingual, Q5 Min PRN      Tele   IP CONSULT TO CARDIOLOGY  IP CONSULT TO UROLOGY    Network Utilization Review Department  ATTENTION: Please call with any questions or concerns to [phone] and carefully listen to the prompts so that you are directed to the right person   All voicemails are confidential   [place] Patient all requests for admission clinical reviews, approved or denied determinations and any other requests to dedicated fax number below belonging to the campus where the patient is receiving treatment   List of dedicated fax numbers for the Facilities:  [address] DENIALS (Administrative/Medical Necessity) [phone]   [address] (Maternity/NICU/Pediatrics) [phone]   [address] [place] [address] [facility] [place] [address] [id number] [address] [place] [facility] [facility] [phone]

## 2021-08-23 NOTE — DISCHARGE SUMMARY
[address] [facility]  [facility] 1960, 57 y o  male MRN: [id number]  Unit/Bed#: ED 08 Encounter: [id number]  Primary Care Provider: No primary care provider on file  Date and time admitted to hospital: 8/22/2021  7:25 AM    * Chest pain  Assessment & Plan  · Presented with substernal chest pain  · Troponin x3 less than 0 02/proBNP 8 4 0  · Patient reportedly has underlying history of CAD status post stent placement on 07/16/2021  He has not been taking his antiplatelet agents at home  · Cardiology did see the patient and recommended nuclear stress test   Patient unwilling to stay and signed out against medical advice  · In the meantime he was loaded with Plavix during hospitalization with 300 mg Plavix and a prescription for 75 mg oral Plavix daily (90 tablets) was sent to his pharmacy  Also recommended patient continue aspirin/statin at home  · Continue with p r n  Nitro at discharge  No beta-blocker recommended given episodes of bradycardia on telemetry  Kidney stone  Assessment & Plan  ·  patient reports several weeks of gross hematuria  · CT scan revealed Distended bladder containing multiple large dependent calculi measuring up to 2 4 cm  · Patient passed multiple kidney stones overnight which was collected in a container by his bedside  · Seen by Urology, appreciate input  · Recommended initiating Flomax  · Patient needs outpatient full BPH workup (cystoscopy examination, transrectal ultrasound/possible cysto litholapaxy )  · Unfortunately patient plans to move to [facility] within the next few days and hence was urged to follow-up with urology over there  Coronary artery disease  Assessment & Plan  · Status post PCI on July 16th  · Been noncompliant with dual antiplatelet therapy  · Resumed aspirin Plavix statin        CKD (chronic kidney disease)  Assessment & Plan  Lab Results   Component Value Date    EGFR 56 08/23/2021    EGFR 57 08/22/2021    EGFR 50 05/01/2021    CREATININE 1 34 (H) 08/23/2021    CREATININE 1 32 (H) 08/22/2021    CREATININE 1 49 (H) 05/01/2021   Creatinine at baseline      Type 2 diabetes mellitus with kidney complication, with long-term current use of insulin [facility])  Assessment & Plan  Lab Results   Component Value Date    HGBA1C 10 5 (H) 04/28/2021       Recent Labs     08/22/21  1634 08/22/21  2203 08/23/21  0745 08/23/21  1123   POCGLU 163* 131 90 151*       Blood Sugar Average: Last 72 hrs:  (P) 134   continue home insulin  Sliding scale insulin      Essential hypertension  Assessment & Plan  · BP currently controlled  · Continue home amlodipine    Class 2 obesity due to excess calories without serious comorbidity with body mass index (BMI) of 36 0 to 36 9 in adult  Assessment & Plan  Diet lifestyle modification      Discharging Physician / Practitioner: Annemarie Franco MD  PCP: No primary care provider on file  Admission Date:   Admission Orders (From admission, onward)     Ordered        08/22/21 1058  Place in Observation  Once                   Discharge Date: 08/23/21    Disposition:    · Left AMA    Reason for Admission: CHEST PAIN    Discharge Diagnoses:     Please see assessment and plan section above for further details regarding discharge diagnoses  Medical Problems     Resolved Problems  Date Reviewed: 8/22/2021    None                Consultations During Hospital Stay:  · Cardiology  · urology    Procedures Performed:   · none    Medication Adjustments and Discharge Medications:  · Summary of Medication Adjustments made as a result of this hospitalization: see med rec  · Medication Dosing Tapers - Please refer to Discharge Medication List for details on any medication dosing tapers (if applicable to patient)  · Medications being temporarily held (include recommended restart time): see med rec  · Discharge Medication List: See after visit summary for reconciled discharge medications       Diet Recommendations at Discharge:  Diet -        Diet Orders   (From admission, onward)             Start     Ordered    08/23/21 0929  Diet Regular; Regular House; Cardiac, No Caffeine, No Chocolate  Diet effective now     Question Answer Comment   Diet Type Regular    Regular Regular House    Other Restriction(s): Cardiac    Other Restriction(s): No Caffeine    Other Restriction(s): No Chocolate    RD to adjust diet per protocol? Yes        08/23/21 0928                 Outpatient Tests Requested:  · Stress test  · BPH work up      Hospital Course:     Leonel Araujo is a 57 y o  male patient who originally presented to the hospital on 8/22/2021 due to complaints of intermittent chest pain  Patient is status post PCI 07/16/2021 and has not been taking his dual antiplatelet therapy at home  · Seen by cardiology, loaded with Plavix and started on 75 daily after that  Also started back on aspirin/statin  Discussed with patient regarding getting a nuclear stress test but patient not willing to stay  Hence he will be signing against medical advice and leaving today  · Also for his hematuria, patient did passed multiple kidney stones during hospitalization  Seen by Urology and recommended to start Flomax  He needs outpatient workup but states he will be leaving to [facility] very soon  Urged that patient follow-up with Urology sooner than later once he is settled in [facility]  · Patient left against medical advice  Please refer to above assessment plan for details of hospitalization      Condition at Discharge: fair     Discharge Day Visit / Exam:     Vitals: Blood Pressure: 166/86 (08/23/21 0300)  Pulse: 67 (08/23/21 1115)  Temperature: 98 1 °F (36 7 °C) (08/23/21 0300)  Temp Source: Oral (08/23/21 0300)  Respirations: 18 (08/23/21 1115)  Height: 5' 9" (175 3 cm) (08/22/21 0728)  Weight - Scale: 107 kg (235 lb) (08/22/21 0728)  SpO2: 98 % (08/23/21 1115)  Exam:   Physical Exam  Constitutional:       General: He is not in acute distress  Appearance: He is obese  He is not toxic-appearing  HENT:      Mouth/Throat:      Mouth: Mucous membranes are moist       Pharynx: Oropharynx is clear  Cardiovascular:      Rate and Rhythm: Normal rate and regular rhythm  Pulses: Normal pulses  Pulmonary:      Effort: Pulmonary effort is normal       Breath sounds: Normal breath sounds  Musculoskeletal:      Right lower leg: No edema  Left lower leg: No edema  Neurological:      General: No focal deficit present  Mental Status: He is alert and oriented to person, place, and time  Discussion with Family: sammie patients mother    Goals of Care Discussions:  · Code Status at Discharge: Level 1 - Full Code    Discharge instructions/Information to patient and family:   See after visit summary section titled Discharge Instructions for information provided to patient and family  Discharge Statement:  I spent 45+ minutes discharging the patient  This time was spent on the day of discharge  I had direct contact with the patient on the day of discharge  Greater than 50% of the total time was spent examining patient, answering all patient questions, arranging and discussing plan of care with patient as well as directly providing post-discharge instructions  Additional time then spent on discharge activities      ** Please Note: This note has been constructed using a voice recognition system **

## 2021-08-23 NOTE — ASSESSMENT & PLAN NOTE
· Status post PCI on July 16th  · Been noncompliant with dual antiplatelet therapy  · Resumed aspirin Plavix statin

## 2021-09-02 ENCOUNTER — HOSPITAL ENCOUNTER (EMERGENCY)
Facility: HOSPITAL | Age: 61
Discharge: HOME | End: 2021-09-02
Attending: EMERGENCY MEDICINE | Admitting: EMERGENCY MEDICINE
Payer: COMMERCIAL

## 2021-09-02 VITALS
TEMPERATURE: 98.1 F | BODY MASS INDEX: 34.07 KG/M2 | WEIGHT: 230 LBS | RESPIRATION RATE: 20 BRPM | HEART RATE: 66 BPM | OXYGEN SATURATION: 98 % | DIASTOLIC BLOOD PRESSURE: 91 MMHG | SYSTOLIC BLOOD PRESSURE: 177 MMHG | HEIGHT: 69 IN

## 2021-09-02 DIAGNOSIS — M54.9 BACK PAIN: Primary | ICD-10-CM

## 2021-09-02 PROCEDURE — 96372 THER/PROPH/DIAG INJ SC/IM: CPT

## 2021-09-02 PROCEDURE — 99283 EMERGENCY DEPT VISIT LOW MDM: CPT

## 2021-09-02 PROCEDURE — 99285 EMERGENCY DEPT VISIT HI MDM: CPT | Performed by: EMERGENCY MEDICINE

## 2021-09-02 RX ORDER — HYDROMORPHONE HCL/PF 1 MG/ML
1 SYRINGE (ML) INJECTION ONCE
Status: COMPLETED | OUTPATIENT
Start: 2021-09-02 | End: 2021-09-02

## 2021-09-02 RX ADMIN — HYDROMORPHONE HYDROCHLORIDE 1 MG: 1 INJECTION, SOLUTION INTRAMUSCULAR; INTRAVENOUS; SUBCUTANEOUS at 15:19

## 2021-09-02 NOTE — DISCHARGE INSTRUCTIONS
Please take medications as prescribed previously  You will get a call from [facility] for follow up

## 2021-09-02 NOTE — ED PROVIDER NOTES
History  Chief Complaint   Patient presents with    Back Pain     pt c/o back pain since friday, he was seen here and at [facility] for the same  HPI   56 yo M presents with back pain for the past week  Pain is aching and constant in low back and tailbone  Able to ambulate, no weakness or numbness  No bowel/bladder incontinence  Was seen for this multiple times, including admission  Has history of kidney stones and recent CT scans show bladder stones, urology has evaluated and recommended outpatient follow up  Patient prescribed muscle relaxants and percocet which helps  He is requesting a shot for pain  Prior to Admission Medications   Prescriptions Last Dose Informant Patient Reported? Taking? Alcohol Swabs 70 % PADS   No No   Sig: May substitute brand based on insurance coverage  Check glucose ACHS  Insulin Degludec (TRESIBA FLEXTOUCH SC)   Yes No   Sig: Inject 120 mg under the skin daily   Lancets (freestyle) lancets   No No   Sig: May substitute brand based on insurance coverage  Check glucose ACHS  amLODIPine (NORVASC) 5 mg tablet   No No   Sig: Take 1 tablet (5 mg total) by mouth daily   ascorbic acid (VITAMIN C) 1000 MG tablet   No No   Sig: Take 1 tablet (1,000 mg total) by mouth every 12 (twelve) hours for 6 doses   aspirin 81 mg chewable tablet   Yes No   Sig: Chew 81 mg   benzonatate (TESSALON PERLES) 100 mg capsule   No No   Sig: Take 1 capsule (100 mg total) by mouth 3 (three) times a day as needed for cough   cholecalciferol (VITAMIN D3) 1,000 units tablet   No No   Sig: Take 2 tablets (2,000 Units total) by mouth daily   clopidogrel (PLAVIX) 75 mg tablet   No No   Sig: Take 1 tablet (75 mg total) by mouth daily   glucose blood (FREESTYLE TEST STRIPS) test strip   No No   Sig: May substitute brand based on insurance coverage  Check glucose ACHS  glucose monitoring kit (FREESTYLE) monitoring kit   No No   Sig: May substitute brand based on insurance coverage   Check glucose ACHS    insulin detemir (LEVEMIR FLEXTOUCH) 100 Units/mL injection pen   Yes No   Sig: Inject under the skin   lisinopril (ZESTRIL) 10 mg tablet   Yes No   Sig: Take 10 mg by mouth daily   nitroglycerin (NITROSTAT) 0 4 mg SL tablet   No No   Sig: Place 1 tablet (0 4 mg total) under the tongue every 5 (five) minutes as needed for chest pain for up to 10 days   tamsulosin (FLOMAX) 0 4 mg   No No   Sig: Take 1 capsule (0 4 mg total) by mouth daily with dinner      Facility-Administered Medications: None       Past Medical History:   Diagnosis Date    Diabetes mellitus ([facility] [address] )     Hypertension        History reviewed  No pertinent surgical history  History reviewed  No pertinent family history  I have reviewed and agree with the history as documented  E-Cigarette/Vaping     E-Cigarette/Vaping Substances     Social History     Tobacco Use    Smoking status: Never Smoker    Smokeless tobacco: Never Used   Substance Use Topics    Alcohol use: Yes    Drug use: Never       Review of Systems   Constitutional: Negative for chills and fever  HENT: Negative for dental problem and ear pain  Eyes: Negative for pain and redness  Respiratory: Negative for cough and shortness of breath  Cardiovascular: Negative for chest pain and palpitations  Gastrointestinal: Negative for abdominal pain and nausea  Endocrine: Negative for polydipsia and polyphagia  Genitourinary: Negative for dysuria and frequency  Musculoskeletal: Positive for back pain  Negative for arthralgias and joint swelling  Skin: Negative for color change and rash  Neurological: Negative for dizziness and headaches  Psychiatric/Behavioral: Negative for behavioral problems and confusion  All other systems reviewed and are negative  Physical Exam  Physical Exam  Vitals and nursing note reviewed  Constitutional:       General: He is not in acute distress  Appearance: He is well-developed  He is not diaphoretic     HENT: Head: Atraumatic  Right Ear: External ear normal       Left Ear: External ear normal       Nose: Nose normal    Eyes:      Conjunctiva/sclera: Conjunctivae normal       Pupils: Pupils are equal, round, and reactive to light  Neck:      Vascular: No JVD  Cardiovascular:      Rate and Rhythm: Normal rate and regular rhythm  Heart sounds: Normal heart sounds  No murmur heard  Pulmonary:      Effort: Pulmonary effort is normal  No respiratory distress  Breath sounds: Normal breath sounds  No wheezing  Abdominal:      General: Bowel sounds are normal  There is no distension  Palpations: Abdomen is soft  Tenderness: There is no abdominal tenderness  Musculoskeletal:         General: Normal range of motion  Cervical back: Normal range of motion and neck supple  Comments: Bilateral lumbar paraspinal TTP, normal strength and sensation bilateral lower extremities   Skin:     General: Skin is warm and dry  Capillary Refill: Capillary refill takes less than 2 seconds  Neurological:      Mental Status: He is alert and oriented to person, place, and time  Cranial Nerves: No cranial nerve deficit     Psychiatric:         Behavior: Behavior normal          Vital Signs  ED Triage Vitals [09/02/21 1408]   Temperature Pulse Respirations Blood Pressure SpO2   98 1 °F (36 7 °C) 66 20 (!) 177/91 98 %      Temp Source Heart Rate Source Patient Position - Orthostatic VS BP Location FiO2 (%)   Temporal Monitor Sitting Left arm --      Pain Score       Worst Possible Pain           Vitals:    09/02/21 1408   BP: (!) 177/91   Pulse: 66   Patient Position - Orthostatic VS: Sitting         Visual Acuity      ED Medications  Medications   HYDROmorphone (DILAUDID) injection 1 mg (1 mg Intramuscular Given 9/2/21 1519)       Diagnostic Studies  Results Reviewed     None                 No orders to display              Procedures  Procedures         ED Course SBIRT 22yo+      Most Recent Value   SBIRT (24 yo +)   In order to provide better care to our patients, we are screening all of our patients for alcohol and drug use  Would it be okay to ask you these screening questions? Yes Filed at: 09/02/2021 1451   Initial Alcohol Screen: US AUDIT-C    1  How often do you have a drink containing alcohol?  0 Filed at: 09/02/2021 1451   2  How many drinks containing alcohol do you have on a typical day you are drinking? 0 Filed at: 09/02/2021 1451   3a  Male UNDER 65: How often do you have five or more drinks on one occasion? 0 Filed at: 09/02/2021 1451   Audit-C Score  0 Filed at: 09/02/2021 1451   TRACE: How many times in the past year have you    Used an illegal drug or used a prescription medication for non-medical reasons? Never Filed at: 09/02/2021 1451                    MDM  Patient presents with ongoing back pain  Low utility to repeat CT scans at this time  He has a normal neuro exam, doubt cauda equina at this time  Patient is prescribed percocet for pain at home  Will refer to comprehensive spine for recheck  Disposition  Final diagnoses:   Back pain     Time reflects when diagnosis was documented in both MDM as applicable and the Disposition within this note     Time User Action Codes Description Comment    9/2/2021  3:03 PM [place] Add [M54 9] Back pain       ED Disposition     ED Disposition Condition Date/Time Comment    Discharge Stable Thu Sep 2, 2021  3:03 PM Ioana Allen discharge to home/self care              Follow-up Information     Follow up With Specialties Details Why Contact Info Additional Information     [facility] Schedule an appointment as soon as possible for a visit   [phone]          Discharge Medication List as of 9/2/2021  3:04 PM      CONTINUE these medications which have NOT CHANGED    Details   Alcohol Swabs 70 % PADS May substitute brand based on insurance coverage  Check glucose ACHS , Normal      amLODIPine (NORVASC) 5 mg tablet Take 1 tablet (5 mg total) by mouth daily, Starting Sat 5/1/2021, Until Mon 5/31/2021, Normal      ascorbic acid (VITAMIN C) 1000 MG tablet Take 1 tablet (1,000 mg total) by mouth every 12 (twelve) hours for 6 doses, Starting Sat 5/1/2021, Until Tue 5/4/2021, Normal      aspirin 81 mg chewable tablet Chew 81 mg, Historical Med      benzonatate (TESSALON PERLES) 100 mg capsule Take 1 capsule (100 mg total) by mouth 3 (three) times a day as needed for cough, Starting Sat 5/1/2021, Normal      cholecalciferol (VITAMIN D3) 1,000 units tablet Take 2 tablets (2,000 Units total) by mouth daily, Starting Sat 5/1/2021, Until Mon 5/31/2021, Normal      clopidogrel (PLAVIX) 75 mg tablet Take 1 tablet (75 mg total) by mouth daily, Starting Tue 8/24/2021, Until Mon 11/22/2021, Normal      glucose blood (FREESTYLE TEST STRIPS) test strip May substitute brand based on insurance coverage  Check glucose ACHS , Normal      glucose monitoring kit (FREESTYLE) monitoring kit May substitute brand based on insurance coverage  Check glucose ACHS , Normal      Insulin Degludec (TRESIBA FLEXTOUCH SC) Inject 120 mg under the skin daily, Historical Med      insulin detemir (LEVEMIR FLEXTOUCH) 100 Units/mL injection pen Inject under the skin, Historical Med      Lancets (freestyle) lancets May substitute brand based on insurance coverage   Check glucose ACHS , Normal      lisinopril (ZESTRIL) 10 mg tablet Take 10 mg by mouth daily, Historical Med      nitroglycerin (NITROSTAT) 0 4 mg SL tablet Place 1 tablet (0 4 mg total) under the tongue every 5 (five) minutes as needed for chest pain for up to 10 days, Starting Mon 8/23/2021, Until Thu 9/2/2021 at 2359, Normal      tamsulosin (FLOMAX) 0 4 mg Take 1 capsule (0 4 mg total) by mouth daily with dinner, Starting Mon 8/23/2021, Until Wed 9/22/2021, Normal               PDMP Review     None          ED Provider  Electronically Signed by           Georgie Santoro MD  09/02/21 0290

## 2021-09-03 ENCOUNTER — TELEPHONE (OUTPATIENT)
Dept: PHYSICAL THERAPY | Facility: OTHER | Age: 61
End: 2021-09-03

## 2021-09-03 NOTE — TELEPHONE ENCOUNTER
Call placed to the patient per [facility] referral and VM's left yesterday  Explained our program to the patient and he is refusing at this time  States he can not do Rehab at this time   Explained he would receiving an evaluation first to determine what would be most appropriate  approach for his pain  States he needs to see an actual physician and proceeded to  Disconnect the call  Referral Closed

## 2023-10-26 ENCOUNTER — HOSPITAL ENCOUNTER (EMERGENCY)
Facility: HOSPITAL | Age: 63
Discharge: HOME | End: 2023-10-26
Attending: STUDENT IN AN ORGANIZED HEALTH CARE EDUCATION/TRAINING PROGRAM
Payer: COMMERCIAL

## 2023-10-26 VITALS
OXYGEN SATURATION: 100 % | RESPIRATION RATE: 16 BRPM | DIASTOLIC BLOOD PRESSURE: 101 MMHG | HEART RATE: 71 BPM | TEMPERATURE: 97.6 F | SYSTOLIC BLOOD PRESSURE: 183 MMHG

## 2023-10-26 DIAGNOSIS — L03.90 CELLULITIS: ICD-10-CM

## 2023-10-26 DIAGNOSIS — L02.91 ABSCESS: Primary | ICD-10-CM

## 2023-10-26 PROCEDURE — 99284 EMERGENCY DEPT VISIT MOD MDM: CPT | Performed by: STUDENT IN AN ORGANIZED HEALTH CARE EDUCATION/TRAINING PROGRAM

## 2023-10-26 PROCEDURE — 99283 EMERGENCY DEPT VISIT LOW MDM: CPT

## 2023-10-26 RX ORDER — LIDOCAINE HYDROCHLORIDE AND EPINEPHRINE 10; 10 MG/ML; UG/ML
5 INJECTION, SOLUTION INFILTRATION; PERINEURAL ONCE
Status: COMPLETED | OUTPATIENT
Start: 2023-10-26 | End: 2023-10-26

## 2023-10-26 RX ORDER — SULFAMETHOXAZOLE AND TRIMETHOPRIM 800; 160 MG/1; MG/1
1 TABLET ORAL 2 TIMES DAILY
Qty: 14 TABLET | Refills: 0 | Status: SHIPPED | OUTPATIENT
Start: 2023-10-26 | End: 2023-11-02

## 2023-10-26 RX ORDER — SULFAMETHOXAZOLE AND TRIMETHOPRIM 800; 160 MG/1; MG/1
1 TABLET ORAL ONCE
Status: COMPLETED | OUTPATIENT
Start: 2023-10-26 | End: 2023-10-26

## 2023-10-26 RX ADMIN — SULFAMETHOXAZOLE AND TRIMETHOPRIM 1 TABLET: 800; 160 TABLET ORAL at 12:01

## 2023-10-26 RX ADMIN — LIDOCAINE HYDROCHLORIDE,EPINEPHRINE BITARTRATE 5 ML: 10; .01 INJECTION, SOLUTION INFILTRATION; PERINEURAL at 11:57

## 2023-10-26 NOTE — ED PROVIDER NOTES
History  Chief Complaint   Patient presents with    Wound Check     Wound on inside of right leg, recent trip to [place] and is fearful it is a spider bite. HPI  Patient is a 60-year-old male present emerged department with a leg wound. Patient said he first started knowing discharge from his leg several days ago. He is concerned he might of had a spider bite. He has been picking at his leg and allowing some of it to drain. Leg remains red and painful to touch. Concerned it might be infected and came in for further evaluation. Denies any current fevers or chills nausea or vomiting. Review of systems otherwise negative. Other history includes diabetes and hypertension. Prior to Admission Medications   Prescriptions Last Dose Informant Patient Reported? Taking? Alcohol Swabs 70 % PADS   No No   Sig: May substitute brand based on insurance coverage. Check glucose ACHS. Insulin Degludec (TRESIBA FLEXTOUCH SC)   Yes No   Sig: Inject 120 mg under the skin daily   Lancets (freestyle) lancets   No No   Sig: May substitute brand based on insurance coverage. Check glucose ACHS. amLODIPine (NORVASC) 5 mg tablet   No No   Sig: Take 1 tablet (5 mg total) by mouth daily   ascorbic acid (VITAMIN C) 1000 MG tablet   No No   Sig: Take 1 tablet (1,000 mg total) by mouth every 12 (twelve) hours for 6 doses   aspirin 81 mg chewable tablet   Yes No   Sig: Chew 81 mg   benzonatate (TESSALON PERLES) 100 mg capsule   No No   Sig: Take 1 capsule (100 mg total) by mouth 3 (three) times a day as needed for cough   cholecalciferol (VITAMIN D3) 1,000 units tablet   No No   Sig: Take 2 tablets (2,000 Units total) by mouth daily   clopidogrel (PLAVIX) 75 mg tablet   No No   Sig: Take 1 tablet (75 mg total) by mouth daily   glucose blood (FREESTYLE TEST STRIPS) test strip   No No   Sig: May substitute brand based on insurance coverage. Check glucose ACHS.    glucose monitoring kit (FREESTYLE) monitoring kit   No No Sig: May substitute brand based on insurance coverage. Check glucose ACHS. insulin detemir (LEVEMIR FLEXTOUCH) 100 Units/mL injection pen   Yes No   Sig: Inject under the skin   lisinopril (ZESTRIL) 10 mg tablet   Yes No   Sig: Take 10 mg by mouth daily   nitroglycerin (NITROSTAT) 0.4 mg SL tablet   No No   Sig: Place 1 tablet (0.4 mg total) under the tongue every 5 (five) minutes as needed for chest pain for up to 10 days   tamsulosin (FLOMAX) 0.4 mg   No No   Sig: Take 1 capsule (0.4 mg total) by mouth daily with dinner      Facility-Administered Medications: None       Past Medical History:   Diagnosis Date    Diabetes mellitus ([address])     Hypertension        History reviewed. No pertinent surgical history. History reviewed. No pertinent family history. I have reviewed and agree with the history as documented. E-Cigarette/Vaping    E-Cigarette Use Never User      E-Cigarette/Vaping Substances     Social History     Tobacco Use    Smoking status: Never    Smokeless tobacco: Never   Vaping Use    Vaping Use: Never used   Substance Use Topics    Alcohol use: Yes    Drug use: Never       Review of Systems   Constitutional:  Negative for chills and fever. HENT:  Negative for ear pain and sore throat. Eyes:  Negative for pain and visual disturbance. Respiratory:  Negative for cough and shortness of breath. Cardiovascular:  Negative for chest pain and palpitations. Gastrointestinal:  Negative for abdominal pain and vomiting. Genitourinary:  Negative for dysuria and hematuria. Musculoskeletal:  Negative for arthralgias and back pain. Skin:  Positive for wound. Negative for color change and rash. Neurological:  Negative for seizures and syncope. All other systems reviewed and are negative. Physical Exam  Physical Exam  Vitals and nursing note reviewed. Constitutional:       General: He is not in acute distress. Appearance: He is well-developed.    HENT:      Head: Normocephalic and atraumatic. Eyes:      Conjunctiva/sclera: Conjunctivae normal.   Cardiovascular:      Rate and Rhythm: Normal rate and regular rhythm. Heart sounds: No murmur heard. Pulmonary:      Effort: Pulmonary effort is normal. No respiratory distress. Breath sounds: Normal breath sounds. Abdominal:      Palpations: Abdomen is soft. Tenderness: There is no abdominal tenderness. Musculoskeletal:         General: Tenderness present. No swelling. Normal range of motion. Cervical back: Neck supple. Skin:     General: Skin is warm and dry. Capillary Refill: Capillary refill takes less than 2 seconds. Comments: Erythema and fluctuance noted on right calf   Neurological:      General: No focal deficit present. Mental Status: He is alert and oriented to person, place, and time. Psychiatric:         Mood and Affect: Mood normal.         Vital Signs  ED Triage Vitals [10/26/23 1055]   Temperature Pulse Respirations Blood Pressure SpO2   97.6 °F (36.4 °C) 71 16 (!) 183/101 100 %      Temp src Heart Rate Source Patient Position - Orthostatic VS BP Location FiO2 (%)   -- -- -- -- --      Pain Score       --           Vitals:    10/26/23 1055   BP: (!) 183/101   Pulse: 71         Visual Acuity      ED Medications  Medications - No data to display    Diagnostic Studies  Results Reviewed       None                   No orders to display              Procedures  Procedures         ED Course                               SBIRT 22yo+      Flowsheet Row Most Recent Value   Initial Alcohol Screen: US AUDIT-C     1. How often do you have a drink containing alcohol? 1 Filed at: 10/26/2023 1058   2. How many drinks containing alcohol do you have on a typical day you are drinking? 0 Filed at: 10/26/2023 1058   3a. Male UNDER 65: How often do you have five or more drinks on one occasion? 0 Filed at: 10/26/2023 1058   3b. FEMALE Any Age, or MALE 65+:  How often do you have 4 or more drinks on one occassion? 0 Filed at: 10/26/2023 1056   Audit-C Score 1 Filed at: 10/26/2023 1055   TRACE: How many times in the past year have you. .. Used an illegal drug or used a prescription medication for non-medical reasons? Never Filed at: 10/26/2023 1053                      Medical Decision Making  Risk  Prescription drug management. Disposition  Final diagnoses:   None     ED Disposition       None          Follow-up Information    None         Patient's Medications   Discharge Prescriptions    No medications on file       No discharge procedures on file.     PDMP Review       None            ED Provider  Electronically Signed by sulfamethoxazole-trimethoprim (BACTRIM DS) 800-160 mg per tablet Take 1 tablet by mouth 2 (two) times a day for 7 days smx-tmp DS (BACTRIM) 800-160 mg tabs (1tab q12 D10), Starting Thu 10/26/2023, Until Thu 11/2/2023, Normal           CONTINUE these medications which have NOT CHANGED    Details   Alcohol Swabs 70 % PADS May substitute brand based on insurance coverage. Check glucose ACHS., Normal      amLODIPine (NORVASC) 5 mg tablet Take 1 tablet (5 mg total) by mouth daily, Starting Sat 5/1/2021, Until Mon 5/31/2021, Normal      ascorbic acid (VITAMIN C) 1000 MG tablet Take 1 tablet (1,000 mg total) by mouth every 12 (twelve) hours for 6 doses, Starting Sat 5/1/2021, Until Tue 5/4/2021, Normal      aspirin 81 mg chewable tablet Chew 81 mg, Historical Med      benzonatate (TESSALON PERLES) 100 mg capsule Take 1 capsule (100 mg total) by mouth 3 (three) times a day as needed for cough, Starting Sat 5/1/2021, Normal      cholecalciferol (VITAMIN D3) 1,000 units tablet Take 2 tablets (2,000 Units total) by mouth daily, Starting Sat 5/1/2021, Until Mon 5/31/2021, Normal      clopidogrel (PLAVIX) 75 mg tablet Take 1 tablet (75 mg total) by mouth daily, Starting Tue 8/24/2021, Until Mon 11/22/2021, Normal      glucose blood (FREESTYLE TEST STRIPS) test strip May substitute brand based on insurance coverage. Check glucose ACHS., Normal      glucose monitoring kit (FREESTYLE) monitoring kit May substitute brand based on insurance coverage. Check glucose ACHS., Normal      Insulin Degludec (TRESIBA FLEXTOUCH SC) Inject 120 mg under the skin daily, Historical Med      insulin detemir (LEVEMIR FLEXTOUCH) 100 Units/mL injection pen Inject under the skin, Historical Med      Lancets (freestyle) lancets May substitute brand based on insurance coverage.  Check glucose ACHS., Normal      lisinopril (ZESTRIL) 10 mg tablet Take 10 mg by mouth daily, Historical Med      nitroglycerin (NITROSTAT) 0.4 mg SL tablet Place 1 tablet (0.4 mg total) under the tongue every 5 (five) minutes as needed for chest pain for up to 10 days, Starting Mon 8/23/2021, Until Thu 9/2/2021 at 2359, Normal      tamsulosin (FLOMAX) 0.4 mg Take 1 capsule (0.4 mg total) by mouth daily with dinner, Starting Mon 8/23/2021, Until Wed 9/22/2021, Normal             No discharge procedures on file.     PDMP Review       None            ED Provider  Electronically Signed by             Bailee Rodriguez DO  11/09/23 5395

## 2023-10-26 NOTE — DISCHARGE INSTRUCTIONS
You can use over-the-counter medication as needed for discomfort. You can prescribe an antibiotic. Please take it as prescribed. You can apply warm compresses to your legs 3 times a day to help with drainage. Keep the wound clean and dry. Follow-up with a primary care physician for reevaluation. Return if any new or worsening such as develop fever, increase redness/swelling and discharge.

## 2023-10-27 ENCOUNTER — HOSPITAL ENCOUNTER (EMERGENCY)
Facility: HOSPITAL | Age: 63
Discharge: HOME | End: 2023-10-27
Attending: EMERGENCY MEDICINE | Admitting: EMERGENCY MEDICINE
Payer: COMMERCIAL

## 2023-10-27 VITALS
DIASTOLIC BLOOD PRESSURE: 92 MMHG | TEMPERATURE: 98.2 F | BODY MASS INDEX: 31.84 KG/M2 | HEIGHT: 69 IN | WEIGHT: 215 LBS | HEART RATE: 87 BPM | OXYGEN SATURATION: 99 % | RESPIRATION RATE: 16 BRPM | SYSTOLIC BLOOD PRESSURE: 196 MMHG

## 2023-10-27 DIAGNOSIS — L03.115 CELLULITIS OF RIGHT LOWER EXTREMITY: Primary | ICD-10-CM

## 2023-10-27 PROCEDURE — 99284 EMERGENCY DEPT VISIT MOD MDM: CPT | Performed by: PHYSICIAN ASSISTANT

## 2023-10-27 PROCEDURE — 99282 EMERGENCY DEPT VISIT SF MDM: CPT

## 2023-10-27 RX ORDER — DOXYCYCLINE HYCLATE 100 MG/1
100 CAPSULE ORAL 2 TIMES DAILY
Qty: 14 CAPSULE | Refills: 0 | Status: SHIPPED | OUTPATIENT
Start: 2023-10-27 | End: 2023-11-03

## 2023-10-27 RX ORDER — HYDROCODONE BITARTRATE AND ACETAMINOPHEN 5; 325 MG/1; MG/1
1 TABLET ORAL ONCE
Status: COMPLETED | OUTPATIENT
Start: 2023-10-27 | End: 2023-10-27

## 2023-10-27 RX ORDER — DOXYCYCLINE HYCLATE 100 MG/1
100 CAPSULE ORAL ONCE
Status: COMPLETED | OUTPATIENT
Start: 2023-10-27 | End: 2023-10-27

## 2023-10-27 RX ORDER — HYDROCODONE BITARTRATE AND ACETAMINOPHEN 5; 325 MG/1; MG/1
1 TABLET ORAL EVERY 6 HOURS PRN
Qty: 7 TABLET | Refills: 0 | Status: SHIPPED | OUTPATIENT
Start: 2023-10-27

## 2023-10-27 RX ADMIN — HYDROCODONE BITARTRATE AND ACETAMINOPHEN 1 TABLET: 5; 325 TABLET ORAL at 15:21

## 2023-10-27 RX ADMIN — DOXYCYCLINE HYCLATE 100 MG: 100 CAPSULE ORAL at 15:18

## 2023-10-27 NOTE — ED PROVIDER NOTES
History  Chief Complaint   Patient presents with    Leg Pain     Redness to R leg since Monday. States he is taking antibiotics at home and redness is getting worse. Patient is a 71-year-old male with a past medical history significant for hypertension, diabetes presenting to the emergency department for evaluation of right lower extremity pain, redness. Symptoms have been ongoing for the last week. He thinks he was bitten by an insect last Friday, however does not know what bit him. Since then he has been having pain and redness to the right lower extremity. He was here yesterday and had an incision and drainage performed. He was started on Bactrim. He states that he has been taking his Bactrim as prescribed without any relief. He has had 2 doses of Bactrim thus far. Denying fevers, chills, chest pain, difficulty breathing, abdominal pain, nausea, vomiting, diarrhea. No other complaints at this time. Prior to Admission Medications   Prescriptions Last Dose Informant Patient Reported? Taking? Alcohol Swabs 70 % PADS   No No   Sig: May substitute brand based on insurance coverage. Check glucose ACHS. Insulin Degludec (TRESIBA FLEXTOUCH SC)   Yes No   Sig: Inject 120 mg under the skin daily   Lancets (freestyle) lancets   No No   Sig: May substitute brand based on insurance coverage. Check glucose ACHS.    amLODIPine (NORVASC) 5 mg tablet   No No   Sig: Take 1 tablet (5 mg total) by mouth daily   ascorbic acid (VITAMIN C) 1000 MG tablet   No No   Sig: Take 1 tablet (1,000 mg total) by mouth every 12 (twelve) hours for 6 doses   aspirin 81 mg chewable tablet   Yes No   Sig: Chew 81 mg   benzonatate (TESSALON PERLES) 100 mg capsule   No No   Sig: Take 1 capsule (100 mg total) by mouth 3 (three) times a day as needed for cough   cholecalciferol (VITAMIN D3) 1,000 units tablet   No No   Sig: Take 2 tablets (2,000 Units total) by mouth daily   clopidogrel (PLAVIX) 75 mg tablet   No No   Sig: Take 1 tablet (75 mg total) by mouth daily   glucose blood (FREESTYLE TEST STRIPS) test strip   No No   Sig: May substitute brand based on insurance coverage. Check glucose ACHS. glucose monitoring kit (FREESTYLE) monitoring kit   No No   Sig: May substitute brand based on insurance coverage. Check glucose ACHS. insulin detemir (LEVEMIR FLEXTOUCH) 100 Units/mL injection pen   Yes No   Sig: Inject under the skin   lisinopril (ZESTRIL) 10 mg tablet   Yes No   Sig: Take 10 mg by mouth daily   nitroglycerin (NITROSTAT) 0.4 mg SL tablet   No No   Sig: Place 1 tablet (0.4 mg total) under the tongue every 5 (five) minutes as needed for chest pain for up to 10 days   sulfamethoxazole-trimethoprim (BACTRIM DS) 800-160 mg per tablet   No No   Sig: Take 1 tablet by mouth 2 (two) times a day for 7 days smx-tmp DS (BACTRIM) 800-160 mg tabs (1tab q12 D10)   tamsulosin (FLOMAX) 0.4 mg   No No   Sig: Take 1 capsule (0.4 mg total) by mouth daily with dinner      Facility-Administered Medications: None       Past Medical History:   Diagnosis Date    Diabetes mellitus ([address])     Hypertension        History reviewed. No pertinent surgical history. History reviewed. No pertinent family history. I have reviewed and agree with the history as documented. E-Cigarette/Vaping    E-Cigarette Use Never User      E-Cigarette/Vaping Substances     Social History     Tobacco Use    Smoking status: Never    Smokeless tobacco: Never   Vaping Use    Vaping Use: Never used   Substance Use Topics    Alcohol use: Yes    Drug use: Never       Review of Systems   Constitutional:  Negative for chills and fever. HENT:  Negative for congestion, rhinorrhea and sore throat. Respiratory:  Negative for cough, chest tightness and shortness of breath. Cardiovascular:  Positive for leg swelling. Negative for chest pain and palpitations. Gastrointestinal:  Negative for abdominal pain, diarrhea, nausea and vomiting.    Musculoskeletal:  Positive for arthralgias and myalgias. Skin:  Positive for color change and wound. All other systems reviewed and are negative. Physical Exam  Physical Exam  Vitals reviewed. Constitutional:       General: He is not in acute distress. Appearance: Normal appearance. He is obese. He is not ill-appearing, toxic-appearing or diaphoretic. HENT:      Head: Normocephalic and atraumatic. Right Ear: External ear normal.      Left Ear: External ear normal.   Eyes:      General: No scleral icterus. Right eye: No discharge. Left eye: No discharge. Extraocular Movements: Extraocular movements intact. Conjunctiva/sclera: Conjunctivae normal.   Cardiovascular:      Rate and Rhythm: Normal rate and regular rhythm. Heart sounds: Normal heart sounds. No murmur heard. No friction rub. No gallop. Pulmonary:      Effort: Pulmonary effort is normal. No respiratory distress. Breath sounds: Normal breath sounds. No stridor. No wheezing, rhonchi or rales. Musculoskeletal:      Cervical back: Normal range of motion and neck supple. Skin:     General: Skin is warm and dry. Findings: Erythema (4x4cm area of erythema and induration to the medial aspect of the R calf consistent with cellulitis. Central puncture wound from previous I and D. No fluctuance or drainage.) present. Neurological:      Mental Status: He is alert and oriented to person, place, and time.    Psychiatric:         Mood and Affect: Mood normal.         Behavior: Behavior normal.         Vital Signs  ED Triage Vitals   Temperature Pulse Respirations Blood Pressure SpO2   10/27/23 1357 10/27/23 1357 10/27/23 1357 10/27/23 1357 10/27/23 1357   98.2 °F (36.8 °C) 87 16 (!) 196/92 99 %      Temp Source Heart Rate Source Patient Position - Orthostatic VS BP Location FiO2 (%)   10/27/23 1357 10/27/23 1357 -- 10/27/23 1357 --   Oral Monitor  Left arm       Pain Score       10/27/23 1521       5           Vitals:    10/27/23 1357   BP: (!) 196/92   Pulse: 87         Visual Acuity  Visual Acuity      Flowsheet Row Most Recent Value   L Pupil Size (mm) 3   R Pupil Size (mm) 3            ED Medications  Medications   doxycycline hyclate (VIBRAMYCIN) capsule 100 mg (100 mg Oral Given 10/27/23 1518)   HYDROcodone-acetaminophen (NORCO) 5-325 mg per tablet 1 tablet (1 tablet Oral Given 10/27/23 1521)       Diagnostic Studies  Results Reviewed       None                   No orders to display              Procedures  Procedures         ED Course                               SBIRT 22yo+      Flowsheet Row Most Recent Value   Initial Alcohol Screen: US AUDIT-C     1. How often do you have a drink containing alcohol? 0 Filed at: 10/27/2023 1459   2. How many drinks containing alcohol do you have on a typical day you are drinking? 0 Filed at: 10/27/2023 1459   3a. Male UNDER 65: How often do you have five or more drinks on one occasion? 0 Filed at: 10/27/2023 1459   Audit-C Score 0 Filed at: 10/27/2023 1459   TRACE: How many times in the past year have you. .. Used an illegal drug or used a prescription medication for non-medical reasons? Never Filed at: 10/27/2023 1459                      Medical Decision Making  Patient presented to the emergency department for evaluation of right lower extremity pain, redness, swelling for 1 week. He had an incision and drainage performed yesterday, he was placed on Bactrim. He feels like the antibiotic is not working. No fevers or chills. He is hypertensive. Remainder vital signs unremarkable. On physical examination he does have a 4 x 4 centimeter area of induration, erythema, tenderness to the right medial calf. This is consistent with cellulitis. Patient was given Norco for analgesia. He was also switched from Bactrim to doxycycline. He was discharged with instructions to follow-up with his primary care provider. Strict return precautions were discussed.   He is in stable condition at time of discharge. Risk  Prescription drug management. Disposition  Final diagnoses:   Cellulitis of right lower extremity     Time reflects when diagnosis was documented in both MDM as applicable and the Disposition within this note       Time User Action Codes Description Comment    10/27/2023  3:05 PM Lorena Winter Add [X33.102] Cellulitis of right lower extremity           ED Disposition       ED Disposition   Discharge    Condition   Stable    Date/Time   Fri Oct 27, 2023 [address] discharge to home/self care. Follow-up Information       Follow up With Specialties Details Why Contact Info Additional [address] [facility] Emergency Department Emergency Medicine Go to  If symptoms worsen [address] [facility] Emergency Department, [place], [place], [address]            Discharge Medication List as of 10/27/2023  3:08 PM        START taking these medications    Details   doxycycline hyclate (VIBRAMYCIN) 100 mg capsule Take 1 capsule (100 mg total) by mouth 2 (two) times a day for 7 days, Starting Fri 10/27/2023, Until Fri 11/3/2023, Normal      HYDROcodone-acetaminophen (NORCO) 5-325 mg per tablet Take 1 tablet by mouth every 6 (six) hours as needed for pain for up to 7 doses Max Daily Amount: 4 tablets, Starting Fri 10/27/2023, Normal           CONTINUE these medications which have NOT CHANGED    Details   Alcohol Swabs 70 % PADS May substitute brand based on insurance coverage.  Check glucose ACHS., Normal      amLODIPine (NORVASC) 5 mg tablet Take 1 tablet (5 mg total) by mouth daily, Starting Sat 5/1/2021, Until Mon 5/31/2021, Normal      ascorbic acid (VITAMIN C) 1000 MG tablet Take 1 tablet (1,000 mg total) by mouth every 12 (twelve) hours for 6 doses, Starting Sat 5/1/2021, Until Tue 5/4/2021, Normal      aspirin 81 mg chewable tablet Chew 81 mg, Historical Med      benzonatate (TESSALON PERLES) 100 mg capsule Take 1 capsule (100 mg total) by mouth 3 (three) times a day as needed for cough, Starting Sat 5/1/2021, Normal      cholecalciferol (VITAMIN D3) 1,000 units tablet Take 2 tablets (2,000 Units total) by mouth daily, Starting Sat 5/1/2021, Until Mon 5/31/2021, Normal      clopidogrel (PLAVIX) 75 mg tablet Take 1 tablet (75 mg total) by mouth daily, Starting Tue 8/24/2021, Until Mon 11/22/2021, Normal      glucose blood (FREESTYLE TEST STRIPS) test strip May substitute brand based on insurance coverage. Check glucose ACHS., Normal      glucose monitoring kit (FREESTYLE) monitoring kit May substitute brand based on insurance coverage. Check glucose ACHS., Normal      Insulin Degludec (TRESIBA FLEXTOUCH SC) Inject 120 mg under the skin daily, Historical Med      insulin detemir (LEVEMIR FLEXTOUCH) 100 Units/mL injection pen Inject under the skin, Historical Med      Lancets (freestyle) lancets May substitute brand based on insurance coverage. Check glucose ACHS., Normal      lisinopril (ZESTRIL) 10 mg tablet Take 10 mg by mouth daily, Historical Med      nitroglycerin (NITROSTAT) 0.4 mg SL tablet Place 1 tablet (0.4 mg total) under the tongue every 5 (five) minutes as needed for chest pain for up to 10 days, Starting Mon 8/23/2021, Until Thu 9/2/2021 at 2359, Normal      sulfamethoxazole-trimethoprim (BACTRIM DS) 800-160 mg per tablet Take 1 tablet by mouth 2 (two) times a day for 7 days smx-tmp DS (BACTRIM) 800-160 mg tabs (1tab q12 D10), Starting Thu 10/26/2023, Until Thu 11/2/2023, Normal      tamsulosin (FLOMAX) 0.4 mg Take 1 capsule (0.4 mg total) by mouth daily with dinner, Starting Mon 8/23/2021, Until Wed 9/22/2021, Normal             No discharge procedures on file.     PDMP Review       None            ED Provider  Electronically Signed by             Brittanie Zaragoza PA-C  10/27/23 5362

## 2023-10-27 NOTE — DISCHARGE INSTRUCTIONS
Stop taking your Bactrim and start taking doxycycline 100 mg by mouth twice a day for 7 days. Antibiotics may take 24-48 hours before there is an effect. If you develop fevers, chills, severe pain, worsening swelling, expanding redness please come back to the emergency department for further evaluation.

## 2023-11-25 ENCOUNTER — HOSPITAL ENCOUNTER (EMERGENCY)
Facility: HOSPITAL | Age: 63
Discharge: HOME | End: 2023-11-25
Attending: EMERGENCY MEDICINE | Admitting: EMERGENCY MEDICINE
Payer: COMMERCIAL

## 2023-11-25 VITALS
DIASTOLIC BLOOD PRESSURE: 109 MMHG | WEIGHT: 225 LBS | BODY MASS INDEX: 33.23 KG/M2 | SYSTOLIC BLOOD PRESSURE: 197 MMHG | RESPIRATION RATE: 18 BRPM | OXYGEN SATURATION: 99 % | HEART RATE: 58 BPM

## 2023-11-25 DIAGNOSIS — L02.91 ABSCESS: Primary | ICD-10-CM

## 2023-11-25 PROCEDURE — 99282 EMERGENCY DEPT VISIT SF MDM: CPT

## 2023-11-25 PROCEDURE — 99284 EMERGENCY DEPT VISIT MOD MDM: CPT | Performed by: EMERGENCY MEDICINE

## 2023-11-25 RX ORDER — ACETAMINOPHEN 325 MG/1
650 TABLET ORAL ONCE
Status: COMPLETED | OUTPATIENT
Start: 2023-11-25 | End: 2023-11-25

## 2023-11-25 RX ORDER — SULFAMETHOXAZOLE AND TRIMETHOPRIM 800; 160 MG/1; MG/1
1 TABLET ORAL 2 TIMES DAILY
Qty: 20 TABLET | Refills: 0 | Status: SHIPPED | OUTPATIENT
Start: 2023-11-25 | End: 2023-12-05

## 2023-11-25 RX ORDER — SULFAMETHOXAZOLE AND TRIMETHOPRIM 800; 160 MG/1; MG/1
2 TABLET ORAL ONCE
Status: COMPLETED | OUTPATIENT
Start: 2023-11-25 | End: 2023-11-25

## 2023-11-25 RX ADMIN — ACETAMINOPHEN 650 MG: 325 TABLET, FILM COATED ORAL at 10:46

## 2023-11-25 RX ADMIN — SULFAMETHOXAZOLE AND TRIMETHOPRIM 2 TABLET: 800; 160 TABLET ORAL at 10:46

## 2023-11-25 NOTE — ED PROVIDER NOTES
History  Chief Complaint   Patient presents with    Cyst     Pt c/o "cyst on rt upper thigh. Found a week ago." Red and swollen. Genoveva Frances is a 58y.o.  year old male  Past Medical History:  No date: Diabetes mellitus ([address])  No date: Hypertension  Social History    Tobacco Use      Smoking status: Never      Smokeless tobacco: Never    Vaping Use      Vaping Use: Never used    Alcohol use: Yes    Drug use: Never    Patient presents with:  Cyst: Pt c/o "cyst on rt upper thigh. Found a week ago." Red and swollen. 1 week onset of right upper thigh infected cyst.  He has had a history of similar in the right calf that it will need to be opened and drained. No fever no chills no nausea or vomiting. This is a small bean sized area on the right medial thigh. History obtained directly from the patient              History provided by:  Patient   used: No        Prior to Admission Medications   Prescriptions Last Dose Informant Patient Reported? Taking? Alcohol Swabs 70 % PADS   No No   Sig: May substitute brand based on insurance coverage. Check glucose ACHS. HYDROcodone-acetaminophen (NORCO) 5-325 mg per tablet   No No   Sig: Take 1 tablet by mouth every 6 (six) hours as needed for pain for up to 7 doses Max Daily Amount: 4 tablets   Insulin Degludec (TRESIBA FLEXTOUCH SC)   Yes No   Sig: Inject 120 mg under the skin daily   Lancets (freestyle) lancets   No No   Sig: May substitute brand based on insurance coverage. Check glucose ACHS.    amLODIPine (NORVASC) 5 mg tablet   No No   Sig: Take 1 tablet (5 mg total) by mouth daily   ascorbic acid (VITAMIN C) 1000 MG tablet   No No   Sig: Take 1 tablet (1,000 mg total) by mouth every 12 (twelve) hours for 6 doses   aspirin 81 mg chewable tablet   Yes No   Sig: Chew 81 mg   benzonatate (TESSALON PERLES) 100 mg capsule   No No   Sig: Take 1 capsule (100 mg total) by mouth 3 (three) times a day as needed for cough   cholecalciferol (VITAMIN D3) 1,000 units tablet   No No   Sig: Take 2 tablets (2,000 Units total) by mouth daily   clopidogrel (PLAVIX) 75 mg tablet   No No   Sig: Take 1 tablet (75 mg total) by mouth daily   glucose blood (FREESTYLE TEST STRIPS) test strip   No No   Sig: May substitute brand based on insurance coverage. Check glucose ACHS. glucose monitoring kit (FREESTYLE) monitoring kit   No No   Sig: May substitute brand based on insurance coverage. Check glucose ACHS. insulin detemir (LEVEMIR FLEXTOUCH) 100 Units/mL injection pen   Yes No   Sig: Inject under the skin   lisinopril (ZESTRIL) 10 mg tablet   Yes No   Sig: Take 10 mg by mouth daily   nitroglycerin (NITROSTAT) 0.4 mg SL tablet   No No   Sig: Place 1 tablet (0.4 mg total) under the tongue every 5 (five) minutes as needed for chest pain for up to 10 days   tamsulosin (FLOMAX) 0.4 mg   No No   Sig: Take 1 capsule (0.4 mg total) by mouth daily with dinner      Facility-Administered Medications: None       Past Medical History:   Diagnosis Date    Diabetes mellitus ([address])     Hypertension        No past surgical history on file. No family history on file. I have reviewed and agree with the history as documented. E-Cigarette/Vaping    E-Cigarette Use Never User      E-Cigarette/Vaping Substances     Social History     Tobacco Use    Smoking status: Never    Smokeless tobacco: Never   Vaping Use    Vaping Use: Never used   Substance Use Topics    Alcohol use: Yes    Drug use: Never       Review of Systems   Constitutional:  Negative for chills and fever. HENT:  Negative for ear pain and sore throat. Eyes:  Negative for pain and visual disturbance. Respiratory:  Negative for cough and shortness of breath. Cardiovascular:  Negative for chest pain and palpitations. Gastrointestinal:  Negative for abdominal pain and vomiting. Genitourinary:  Negative for dysuria and hematuria. Musculoskeletal:  Negative for arthralgias and back pain.    Skin:  Positive for wound. Negative for color change and rash. Neurological:  Negative for seizures and syncope. All other systems reviewed and are negative. Physical Exam  Physical Exam  Vitals and nursing note reviewed. Constitutional:       General: He is not in acute distress. Appearance: Normal appearance. He is well-developed. HENT:      Head: Normocephalic and atraumatic. Mouth/Throat:      Mouth: Mucous membranes are moist.   Eyes:      Conjunctiva/sclera: Conjunctivae normal.      Pupils: Pupils are equal, round, and reactive to light. Cardiovascular:      Rate and Rhythm: Normal rate and regular rhythm. Heart sounds: No murmur heard. Pulmonary:      Effort: Pulmonary effort is normal. No respiratory distress. Breath sounds: Normal breath sounds. Abdominal:      Palpations: Abdomen is soft. Tenderness: There is no abdominal tenderness. Musculoskeletal:         General: No swelling. Cervical back: Neck supple. Skin:     General: Skin is warm and dry. Capillary Refill: Capillary refill takes less than 2 seconds. Comments: Patient has small area of induration and redness. It is the size of a small bean. No surrounding cellulitis no fluctuance. No active drainage. No lymphangitis. Neurological:      General: No focal deficit present. Mental Status: He is alert and oriented to person, place, and time. Psychiatric:         Mood and Affect: Mood normal.         Thought Content:  Thought content normal.         Vital Signs  ED Triage Vitals   Temp Pulse Respirations Blood Pressure SpO2   -- 11/25/23 1031 11/25/23 1031 11/25/23 1031 11/25/23 1031    56 18 (!) 220/107 99 %      Temp src Heart Rate Source Patient Position - Orthostatic VS BP Location FiO2 (%)   -- 11/25/23 1031 11/25/23 1031 11/25/23 1031 --    Monitor Sitting Right arm       Pain Score       11/25/23 1046       6           Vitals:    11/25/23 1031 11/25/23 1045   BP: (!) 220/107 (!) 197/109 Pulse: 56 58   Patient Position - Orthostatic VS: Sitting          Visual Acuity      ED Medications  Medications   sulfamethoxazole-trimethoprim (BACTRIM DS) 800-160 mg per tablet 2 tablet (2 tablets Oral Given 11/25/23 1046)   acetaminophen (TYLENOL) tablet 650 mg (650 mg Oral Given 11/25/23 1046)       Diagnostic Studies  Results Reviewed       None                   No orders to display              Procedures  Procedures         ED Course                               SBIRT 20yo+      Flowsheet Row Most Recent Value   Initial Alcohol Screen: US AUDIT-C     1. How often do you have a drink containing alcohol? 0 Filed at: 11/25/2023 1035   2. How many drinks containing alcohol do you have on a typical day you are drinking? 0 Filed at: 11/25/2023 1035   3a. Male UNDER 65: How often do you have five or more drinks on one occasion? 0 Filed at: 11/25/2023 1035   Audit-C Score 0 Filed at: 11/25/2023 1035                      Medical Decision Making  Problems Addressed:  Abscess: acute illness or injury     Details: Not drained in the emergency department    Risk  OTC drugs. Prescription drug management. Disposition  Final diagnoses:   Abscess     Time reflects when diagnosis was documented in both MDM as applicable and the Disposition within this note       Time User Action Codes Description Comment    11/25/2023 10:39 AM Nola Arriaga Add [L02.91] Abscess           ED Disposition       ED Disposition   Discharge    Condition   Stable    Date/Time   Sat Nov 25, 2023 [facility]    [facility] discharge to home/self care.                    Follow-up Information    None         Discharge Medication List as of 11/25/2023 10:40 AM        START taking these medications    Details   sulfamethoxazole-trimethoprim (BACTRIM DS) 800-160 mg per tablet Take 1 tablet by mouth 2 (two) times a day for 10 days smx-tmp DS (BACTRIM) 800-160 mg tabs (1tab q12 D10), Starting Sat 11/25/2023, Until Tue 12/5/2023, Normal           CONTINUE these medications which have NOT CHANGED    Details   Alcohol Swabs 70 % PADS May substitute brand based on insurance coverage. Check glucose ACHS., Normal      amLODIPine (NORVASC) 5 mg tablet Take 1 tablet (5 mg total) by mouth daily, Starting Sat 5/1/2021, Until Mon 5/31/2021, Normal      ascorbic acid (VITAMIN C) 1000 MG tablet Take 1 tablet (1,000 mg total) by mouth every 12 (twelve) hours for 6 doses, Starting Sat 5/1/2021, Until Tue 5/4/2021, Normal      aspirin 81 mg chewable tablet Chew 81 mg, Historical Med      benzonatate (TESSALON PERLES) 100 mg capsule Take 1 capsule (100 mg total) by mouth 3 (three) times a day as needed for cough, Starting Sat 5/1/2021, Normal      cholecalciferol (VITAMIN D3) 1,000 units tablet Take 2 tablets (2,000 Units total) by mouth daily, Starting Sat 5/1/2021, Until Mon 5/31/2021, Normal      clopidogrel (PLAVIX) 75 mg tablet Take 1 tablet (75 mg total) by mouth daily, Starting Tue 8/24/2021, Until Mon 11/22/2021, Normal      glucose blood (FREESTYLE TEST STRIPS) test strip May substitute brand based on insurance coverage. Check glucose ACHS., Normal      glucose monitoring kit (FREESTYLE) monitoring kit May substitute brand based on insurance coverage. Check glucose ACHS., Normal      HYDROcodone-acetaminophen (NORCO) 5-325 mg per tablet Take 1 tablet by mouth every 6 (six) hours as needed for pain for up to 7 doses Max Daily Amount: 4 tablets, Starting Fri 10/27/2023, Normal      Insulin Degludec (TRESIBA FLEXTOUCH SC) Inject 120 mg under the skin daily, Historical Med      insulin detemir (LEVEMIR FLEXTOUCH) 100 Units/mL injection pen Inject under the skin, Historical Med      Lancets (freestyle) lancets May substitute brand based on insurance coverage.  Check glucose ACHS., Normal      lisinopril (ZESTRIL) 10 mg tablet Take 10 mg by mouth daily, Historical Med      nitroglycerin (NITROSTAT) 0.4 mg SL tablet Place 1 tablet (0.4 mg total) under the tongue every 5 (five) minutes as needed for chest pain for up to 10 days, Starting Mon 8/23/2021, Until Thu 9/2/2021 at 2359, Normal      tamsulosin (FLOMAX) 0.4 mg Take 1 capsule (0.4 mg total) by mouth daily with dinner, Starting Mon 8/23/2021, Until Wed 9/22/2021, Normal             No discharge procedures on file.     PDMP Review       None            ED Provider  Electronically Signed by             Chu Khoury MD  11/25/23 6674

## 2023-11-25 NOTE — DISCHARGE INSTRUCTIONS
A  personal message from Dr. Meera Serrano,  Thank you so much for allowing me to care for you today. I pride myself in the care and attention I give all my patients. I hope you were a witness to this tonight. If for any reason your condition does not improve or worsens, or you have a question that was not answered during your visit you can feel free to text me on my personal phone #  # [phone]. I will answer to your message and continue your care past your emergency room visit. Please understand that although you are being discharged because your condition has been deemed stable and able to be managed on an outpatient setting. However your condition may worsen as part of the natural progression of the illness/condition, if this occurs please come back to the emergency department for a repeat evaluation.

## 2024-01-01 ENCOUNTER — HOSPITAL ENCOUNTER (EMERGENCY)
Facility: HOSPITAL | Age: 64
End: 2024-06-30
Attending: EMERGENCY MEDICINE
Payer: COMMERCIAL

## 2024-01-01 DIAGNOSIS — I46.9 CARDIOPULMONARY ARREST (HCC): Primary | ICD-10-CM

## 2024-01-01 LAB
BASE EXCESS BLDA CALC-SCNC: -18 MMOL/L (ref -2–3)
CA-I BLD-SCNC: 0.94 MMOL/L (ref 1.12–1.32)
GLUCOSE SERPL-MCNC: 225 MG/DL (ref 65–140)
GLUCOSE SERPL-MCNC: 311 MG/DL (ref 65–140)
HCO3 BLDA-SCNC: 11 MMOL/L (ref 24–30)
HCT VFR BLD CALC: 38 % (ref 36.5–49.3)
HGB BLDA-MCNC: 12.9 G/DL (ref 12–17)
PCO2 BLD: 12 MMOL/L (ref 21–32)
PCO2 BLD: 36.7 MM HG (ref 42–50)
PH BLD: 7.08 [PH] (ref 7.3–7.4)
PO2 BLD: 36 MM HG (ref 35–45)
POTASSIUM BLD-SCNC: 5.5 MMOL/L (ref 3.5–5.3)
SAO2 % BLD FROM PO2: 49 % (ref 60–85)
SODIUM BLD-SCNC: 139 MMOL/L (ref 136–145)
SPECIMEN SOURCE: ABNORMAL

## 2024-01-01 PROCEDURE — 99285 EMERGENCY DEPT VISIT HI MDM: CPT

## 2024-01-01 PROCEDURE — 84132 ASSAY OF SERUM POTASSIUM: CPT

## 2024-01-01 PROCEDURE — 92950 HEART/LUNG RESUSCITATION CPR: CPT

## 2024-01-01 PROCEDURE — 82948 REAGENT STRIP/BLOOD GLUCOSE: CPT

## 2024-01-01 PROCEDURE — 82330 ASSAY OF CALCIUM: CPT

## 2024-01-01 PROCEDURE — 82803 BLOOD GASES ANY COMBINATION: CPT

## 2024-01-01 PROCEDURE — 84295 ASSAY OF SERUM SODIUM: CPT

## 2024-01-01 PROCEDURE — 85014 HEMATOCRIT: CPT

## 2024-01-01 PROCEDURE — 82947 ASSAY GLUCOSE BLOOD QUANT: CPT

## 2024-01-01 PROCEDURE — 99285 EMERGENCY DEPT VISIT HI MDM: CPT | Performed by: EMERGENCY MEDICINE

## 2024-01-01 PROCEDURE — 92950 HEART/LUNG RESUSCITATION CPR: CPT | Performed by: EMERGENCY MEDICINE

## 2024-01-01 RX ORDER — EPINEPHRINE 0.1 MG/ML
7 SYRINGE (ML) INJECTION ONCE
Status: COMPLETED | OUTPATIENT
Start: 2024-01-01 | End: 2024-01-01

## 2024-01-01 RX ORDER — SODIUM BICARBONATE 0.5MEQ/ML
1 VIAL (ML) INTRAVENOUS ONCE
Status: DISCONTINUED | OUTPATIENT
Start: 2024-01-01 | End: 2024-01-01 | Stop reason: HOSPADM

## 2024-01-02 ENCOUNTER — APPOINTMENT (EMERGENCY)
Dept: CT IMAGING | Facility: HOSPITAL | Age: 64
End: 2024-01-02
Payer: COMMERCIAL

## 2024-01-02 ENCOUNTER — HOSPITAL ENCOUNTER (EMERGENCY)
Facility: HOSPITAL | Age: 64
Discharge: HOME | End: 2024-01-02
Payer: COMMERCIAL

## 2024-01-02 VITALS
RESPIRATION RATE: 18 BRPM | TEMPERATURE: 97.8 F | HEART RATE: 69 BPM | OXYGEN SATURATION: 99 % | SYSTOLIC BLOOD PRESSURE: 234 MMHG | DIASTOLIC BLOOD PRESSURE: 104 MMHG

## 2024-01-02 DIAGNOSIS — J02.9 SORE THROAT: Primary | ICD-10-CM

## 2024-01-02 LAB
ALBUMIN SERPL BCP-MCNC: 4 G/DL (ref 3.5–5)
ALP SERPL-CCNC: 81 U/L (ref 34–104)
ALT SERPL W P-5'-P-CCNC: 18 U/L (ref 7–52)
ANION GAP SERPL CALCULATED.3IONS-SCNC: 6 MMOL/L
AST SERPL W P-5'-P-CCNC: 10 U/L (ref 13–39)
BASOPHILS # BLD AUTO: 0.04 THOUSANDS/ÂΜL (ref 0–0.1)
BASOPHILS NFR BLD AUTO: 0 % (ref 0–1)
BILIRUB SERPL-MCNC: 0.35 MG/DL (ref 0.2–1)
BUN SERPL-MCNC: 29 MG/DL (ref 5–25)
CALCIUM SERPL-MCNC: 9.3 MG/DL (ref 8.4–10.2)
CHLORIDE SERPL-SCNC: 107 MMOL/L (ref 96–108)
CO2 SERPL-SCNC: 23 MMOL/L (ref 21–32)
CREAT SERPL-MCNC: 1.63 MG/DL (ref 0.6–1.3)
EOSINOPHIL # BLD AUTO: 0.24 THOUSAND/ÂΜL (ref 0–0.61)
EOSINOPHIL NFR BLD AUTO: 2 % (ref 0–6)
ERYTHROCYTE [DISTWIDTH] IN BLOOD BY AUTOMATED COUNT: 12.6 % (ref 11.6–15.1)
FLUAV RNA RESP QL NAA+PROBE: NEGATIVE
FLUBV RNA RESP QL NAA+PROBE: NEGATIVE
GFR SERPL CREATININE-BSD FRML MDRD: 43 ML/MIN/1.73SQ M
GLUCOSE SERPL-MCNC: 405 MG/DL (ref 65–140)
HCT VFR BLD AUTO: 39 % (ref 36.5–49.3)
HGB BLD-MCNC: 13.5 G/DL (ref 12–17)
IMM GRANULOCYTES # BLD AUTO: 0.08 THOUSAND/UL (ref 0–0.2)
IMM GRANULOCYTES NFR BLD AUTO: 1 % (ref 0–2)
LYMPHOCYTES # BLD AUTO: 1.97 THOUSANDS/ÂΜL (ref 0.6–4.47)
LYMPHOCYTES NFR BLD AUTO: 17 % (ref 14–44)
MCH RBC QN AUTO: 28.4 PG (ref 26.8–34.3)
MCHC RBC AUTO-ENTMCNC: 34.6 G/DL (ref 31.4–37.4)
MCV RBC AUTO: 82 FL (ref 82–98)
MONOCYTES # BLD AUTO: 0.89 THOUSAND/ÂΜL (ref 0.17–1.22)
MONOCYTES NFR BLD AUTO: 8 % (ref 4–12)
NEUTROPHILS # BLD AUTO: 8.34 THOUSANDS/ÂΜL (ref 1.85–7.62)
NEUTS SEG NFR BLD AUTO: 72 % (ref 43–75)
NRBC BLD AUTO-RTO: 0 /100 WBCS
PLATELET # BLD AUTO: 304 THOUSANDS/UL (ref 149–390)
PMV BLD AUTO: 8.8 FL (ref 8.9–12.7)
POTASSIUM SERPL-SCNC: 4.6 MMOL/L (ref 3.5–5.3)
PROT SERPL-MCNC: 7.5 G/DL (ref 6.4–8.4)
RBC # BLD AUTO: 4.76 MILLION/UL (ref 3.88–5.62)
RSV RNA RESP QL NAA+PROBE: NEGATIVE
SARS-COV-2 RNA RESP QL NAA+PROBE: NEGATIVE
SODIUM SERPL-SCNC: 136 MMOL/L (ref 135–147)
WBC # BLD AUTO: 11.56 THOUSAND/UL (ref 4.31–10.16)

## 2024-01-02 PROCEDURE — 0241U HB NFCT DS VIR RESP RNA 4 TRGT: CPT | Performed by: EMERGENCY MEDICINE

## 2024-01-02 PROCEDURE — 36415 COLL VENOUS BLD VENIPUNCTURE: CPT | Performed by: PHYSICIAN ASSISTANT

## 2024-01-02 PROCEDURE — 80053 COMPREHEN METABOLIC PANEL: CPT | Performed by: PHYSICIAN ASSISTANT

## 2024-01-02 PROCEDURE — 85025 COMPLETE CBC W/AUTO DIFF WBC: CPT | Performed by: PHYSICIAN ASSISTANT

## 2024-01-02 PROCEDURE — 99284 EMERGENCY DEPT VISIT MOD MDM: CPT | Performed by: PHYSICIAN ASSISTANT

## 2024-01-02 PROCEDURE — 99282 EMERGENCY DEPT VISIT SF MDM: CPT

## 2024-01-02 RX ADMIN — DEXAMETHASONE SODIUM PHOSPHATE 10 MG: 10 INJECTION, SOLUTION INTRAMUSCULAR; INTRAVENOUS at 07:56

## 2024-01-02 NOTE — ED NOTES
"Patient states that he does not wish to wait any longer and would like to have his IV removed.   Patient states \"this is horrible care, I have been waiting for hours for my sore throat and have been given nothing.\" This nurse offered patient oral dexamethasone which order was placed at 0749. Patient states that he will take this but is not waiting for anything else. PA made aware. Spoke with patient who does not wish to stay \"because everything is taking too long.\"     IV removed from patients arm, patient ambulatory out of department without any difficulty. Unable to obtain repeat vital signs before leaving.      Angelina Jean RN  01/02/24 0862    "

## 2024-01-02 NOTE — ED PROVIDER NOTES
History  Chief Complaint   Patient presents with    Sore Throat     Patient reports sore throat for 2.5 days.  Patient concerned because his father  of throat cancer.     Blair Caldwell is a 63-year-old male with past medical history including hypertension and diabetes arriving ambulatory to the emergency department for evaluation of several days of sore throat.  Patient states that over the past couple of days his pain has become more left-sided.  He reports pain with swallowing.  No appreciable dysphonia.  He denies drooling or inability to tolerate oral secretions, dental pain, trismus, headache, vision disturbances, ear pain.  He does report subjective fever/chills as well and is afebrile on presentation.  He denies any known sick contact.  He denies history of similar symptoms in the past.  The patient states that he mainly had presented today because he has no prior history of a sore throat and his father had passed away from throat cancer and he wanted to be checked for this.  He denies tobacco use or smoking history, or history of esophagitis/GERD.  He offers no other complaints or concerns at this time.        Prior to Admission Medications   Prescriptions Last Dose Informant Patient Reported? Taking?   Alcohol Swabs 70 % PADS   No No   Sig: May substitute brand based on insurance coverage. Check glucose ACHS.   HYDROcodone-acetaminophen (NORCO) 5-325 mg per tablet   No No   Sig: Take 1 tablet by mouth every 6 (six) hours as needed for pain for up to 7 doses Max Daily Amount: 4 tablets   Insulin Degludec (TRESIBA FLEXTOUCH SC)   Yes No   Sig: Inject 120 mg under the skin daily   Lancets (freestyle) lancets   No No   Sig: May substitute brand based on insurance coverage. Check glucose ACHS.   amLODIPine (NORVASC) 5 mg tablet   No No   Sig: Take 1 tablet (5 mg total) by mouth daily   ascorbic acid (VITAMIN C) 1000 MG tablet   No No   Sig: Take 1 tablet (1,000 mg total) by mouth every 12 (twelve) hours for  6 doses   aspirin 81 mg chewable tablet   Yes No   Sig: Chew 81 mg   benzonatate (TESSALON PERLES) 100 mg capsule   No No   Sig: Take 1 capsule (100 mg total) by mouth 3 (three) times a day as needed for cough   cholecalciferol (VITAMIN D3) 1,000 units tablet   No No   Sig: Take 2 tablets (2,000 Units total) by mouth daily   clopidogrel (PLAVIX) 75 mg tablet   No No   Sig: Take 1 tablet (75 mg total) by mouth daily   glucose blood (FREESTYLE TEST STRIPS) test strip   No No   Sig: May substitute brand based on insurance coverage. Check glucose ACHS.   glucose monitoring kit (FREESTYLE) monitoring kit   No No   Sig: May substitute brand based on insurance coverage. Check glucose ACHS.   insulin detemir (LEVEMIR FLEXTOUCH) 100 Units/mL injection pen   Yes No   Sig: Inject under the skin   lisinopril (ZESTRIL) 10 mg tablet   Yes No   Sig: Take 10 mg by mouth daily   nitroglycerin (NITROSTAT) 0.4 mg SL tablet   No No   Sig: Place 1 tablet (0.4 mg total) under the tongue every 5 (five) minutes as needed for chest pain for up to 10 days   tamsulosin (FLOMAX) 0.4 mg   No No   Sig: Take 1 capsule (0.4 mg total) by mouth daily with dinner      Facility-Administered Medications: None       Past Medical History:   Diagnosis Date    Diabetes mellitus (HCC)     Hypertension        Past Surgical History:   Procedure Laterality Date    HAND SURGERY         History reviewed. No pertinent family history.  I have reviewed and agree with the history as documented.    E-Cigarette/Vaping    E-Cigarette Use Never User      E-Cigarette/Vaping Substances     Social History     Tobacco Use    Smoking status: Never    Smokeless tobacco: Never   Vaping Use    Vaping status: Never Used   Substance Use Topics    Alcohol use: Yes    Drug use: Never       Review of Systems   Constitutional:  Positive for fever.   HENT:  Positive for sore throat. Negative for ear pain and trouble swallowing.    Gastrointestinal:  Negative for abdominal pain,  diarrhea, nausea and vomiting.   Neurological:  Negative for headaches.   All other systems reviewed and are negative.      Physical Exam  Physical Exam  Vitals and nursing note reviewed.   Constitutional:       General: He is not in acute distress.     Appearance: Normal appearance. He is well-developed. He is not ill-appearing, toxic-appearing or diaphoretic.   HENT:      Head: Normocephalic and atraumatic.      Jaw: No trismus.      Right Ear: Tympanic membrane, ear canal and external ear normal.      Left Ear: Tympanic membrane, ear canal and external ear normal.      Nose: No congestion or rhinorrhea.      Mouth/Throat:      Lips: Pink.      Mouth: Mucous membranes are moist.      Pharynx: Oropharynx is clear. Uvula midline. No pharyngeal swelling, oropharyngeal exudate, posterior oropharyngeal erythema or uvula swelling.      Tonsils: No tonsillar exudate or tonsillar abscesses.      Comments: Oropharynx grossly patent.  Patient speaking in full sentences, tolerating oral secretions, protecting airway. No tracheal deviation.  Eyes:      Conjunctiva/sclera: Conjunctivae normal.   Cardiovascular:      Rate and Rhythm: Normal rate.   Pulmonary:      Effort: Pulmonary effort is normal.   Musculoskeletal:         General: Normal range of motion.      Cervical back: Normal range of motion and neck supple.   Skin:     General: Skin is warm and dry.      Capillary Refill: Capillary refill takes less than 2 seconds.   Neurological:      Mental Status: He is alert.         Vital Signs  ED Triage Vitals [01/02/24 0506]   Temperature Pulse Respirations Blood Pressure SpO2   97.8 °F (36.6 °C) 69 18 (!) 234/104 99 %      Temp Source Heart Rate Source Patient Position - Orthostatic VS BP Location FiO2 (%)   Oral Monitor Sitting Left arm --      Pain Score       8           Vitals:    01/02/24 0506   BP: (!) 234/104   Pulse: 69   Patient Position - Orthostatic VS: Sitting         Visual Acuity      ED Medications  Medications    dexamethasone oral liquid 10 mg 1 mL (10 mg Oral Given 1/2/24 0756)       Diagnostic Studies  Results Reviewed       Procedure Component Value Units Date/Time    CBC and differential [[id number]] Collected: 01/02/24 0722    Lab Status: No result Specimen: Blood from Arm, Right     Comprehensive metabolic panel [[id number]] Collected: 01/02/24 0722    Lab Status: No result Specimen: Blood from Arm, Right     FLU/RSV/COVID - if FLU/RSV clinically relevant [[id number]]  (Normal) Collected: 01/02/24 0510    Lab Status: Final result Specimen: Nares from Nose Updated: 01/02/24 0607     SARS-CoV-2 Negative     INFLUENZA A PCR Negative     INFLUENZA B PCR Negative     RSV PCR Negative    Narrative:      FOR PEDIATRIC PATIENTS - copy/paste COVID Guidelines URL to browser: [email or web address]    SARS-CoV-2 assay is a Nucleic Acid Amplification assay intended for the  qualitative detection of nucleic acid from SARS-CoV-2 in nasopharyngeal  swabs. Results are for the presumptive identification of SARS-CoV-2 RNA.    Positive results are indicative of infection with SARS-CoV-2, the virus  causing COVID-19, but do not rule out bacterial infection or co-infection  with other viruses. Laboratories within the [place] and its  territories are required to report all positive results to the appropriate  public health authorities. Negative results do not preclude SARS-CoV-2  infection and should not be used as the sole basis for treatment or other  patient management decisions. Negative results must be combined with  clinical observations, patient history, and epidemiological information.  This test has not been FDA cleared or approved.    This test has been authorized by FDA under an Emergency Use Authorization  (EUA). This test is only authorized for the duration of time the  declaration that circumstances exist justifying the authorization of the  emergency use of an in vitro  diagnostic tests for detection of SARS-CoV-2  virus and/or diagnosis of COVID-19 infection under section 564(b)(1) of  the Act, 21 U.S.C. 360bbb-3(b)(1), unless the authorization is terminated  or revoked sooner. The test has been validated but independent review by FDA  and CLIA is pending.    Test performed using [employer] GeneXpert: This RT-PCR assay targets N2,  a region unique to SARS-CoV-2. A conserved region in the E-gene was chosen  for pan-Sarbecovirus detection which includes SARS-CoV-2.    According to CMS-2020-01-R, this platform meets the definition of high-throughput technology.    Strep A PCR [[id number]] Collected: 01/02/24 0601    Lab Status: In process Specimen: Throat Updated: 01/02/24 0604                   No orders to display              Procedures  Procedures         ED Course  ED Course as of 01/02/24 1620   Tue Jan 02, 2024   0807 Pt reassessed, requested his IV was removed by RN because he doesn't want to be here any longer. States that he's frustrated he didn't get an update of the plan we previously discussed and doesn't want to proceed with the CT today. Recommended that the patient follows up with PCP/ENT as an outpatient                               Medical Decision Making  This is a 63-year-old male arriving to the emergency department with sore throat that started a couple of days ago and has been gradually worsening and becoming more left-sided.  He expresses concern for the possibility of throat cancer as his father had recently passed from this.    Differential diagnosis includes but is not limited to: Pharyngitis, strep throat, viral illness, allergies, esophagitis, will obtain imaging to evaluate for mass/tumor, adenopathy, tonsillar abscess; at this time there are no objective findings consistent with acute airway threat, with low clinical suspicion for Onur's angina, retropharyngeal abscess or other life-threatening process    Initial ED plan: Labs, imaging    Final ED  Assessment: Vital signs reviewed on ED presentation, examination as above. All labs and imaging independently reviewed with imaging interpreted by the Radiologist.  Lab work demonstrates demonstrates a slight elevation in serum creatinine compared to prior lab values which I discussed with the patient.  He is COVID/flu/RSV negative.  Strep PCR in process and I contacted the lab and was told by the technician that it has approximately 10 minutes left on the analyzer.  I was called to the bedside by the patient's RN as the patient was requesting to leave.  Patient expressed frustration that he was not provided more frequent updates and he did not want to sit in the department any longer.  The order for CT soft tissue neck has been discontinued and this can reasonably be performed in the outpatient setting and reordered by his PCP or ENT.  Supportive measures discussed and outpatient follow-up included in his discharge papers.  Parameters for ED return discussed at length.      Amount and/or Complexity of Data Reviewed  Labs: ordered.  Radiology: ordered.    Risk  OTC drugs.             Disposition  Final diagnoses:   Sore throat     Time reflects when diagnosis was documented in both MDM as applicable and the Disposition within this note       Time User Action Codes Description Comment    1/2/2024  8:04 AM Adina West Add [J02.9] Sore throat           ED Disposition       ED Disposition   Discharge    Condition   Stable    Date/Time   Tue Jan 2, 2024  8:04 AM    Comment   Blair Caldwell discharge to home/self care.                   Follow-up Information       Follow up With Specialties Details Why Contact Info Additional Information    [facility] Otol[facility]ology Call   [address]  [address]  [facility] [id number] [phone] [facility] [address], [place] [address]    [facility]   [address]  [address]  [facility] [id number] [phone] [facility] [place], [address], [address], [place], [address] [phone]    [facility] Emergency Department Emergency Medicine  If symptoms worsen [address] [id number] [phone] [facility] Emergency Department, [address], [place], [address]            Patient's Medications   Discharge Prescriptions    No medications on file       No discharge procedures on file.    PDMP Review       None            ED Provider  Electronically Signed by             Adina West PA-C  01/02/24 7907

## 2024-01-03 RX ORDER — AMOXICILLIN 500 MG/1
500 CAPSULE ORAL 3 TIMES DAILY
Qty: 30 CAPSULE | Refills: 0 | Status: SHIPPED | OUTPATIENT
Start: 2024-01-03 | End: 2024-01-13

## 2024-01-03 NOTE — QUICK NOTE
Patient called and spoke with our patient  regarding concerns about his care.in the ED for sore throat yesterday.   I called patient back to discuss his concerns and follow up on results   unfortunately the strep test that was sent down during ED visit did not result ..   The ED provider was notified after the patient had requested to leave the department because everything was taking too long.   Lab requested a repeat specimen be obtained to repeat the test.  I offered the patient the opportunity to come in and have repeat strep test performed verses symptomatic treatment with amoxicillin.  Patient agreeable to having amoxicillin RX called in to [facility] in [place].     We discussed his experience and I explained ED volumes have been incredibly high for the past few weeks unfortunately contributing to increased waiting times.

## 2024-01-05 ENCOUNTER — HOSPITAL ENCOUNTER (EMERGENCY)
Facility: HOSPITAL | Age: 64
Discharge: HOME | End: 2024-01-05
Attending: EMERGENCY MEDICINE
Payer: COMMERCIAL

## 2024-01-05 ENCOUNTER — APPOINTMENT (EMERGENCY)
Dept: CT IMAGING | Facility: HOSPITAL | Age: 64
End: 2024-01-05
Payer: COMMERCIAL

## 2024-01-05 VITALS
RESPIRATION RATE: 18 BRPM | OXYGEN SATURATION: 97 % | DIASTOLIC BLOOD PRESSURE: 71 MMHG | TEMPERATURE: 98.8 F | SYSTOLIC BLOOD PRESSURE: 154 MMHG | HEART RATE: 63 BPM

## 2024-01-05 DIAGNOSIS — R93.89 ABNORMAL CT SCAN: ICD-10-CM

## 2024-01-05 DIAGNOSIS — J02.9 SORE THROAT: Primary | ICD-10-CM

## 2024-01-05 DIAGNOSIS — R79.89 ELEVATED SERUM CREATININE: ICD-10-CM

## 2024-01-05 LAB
ANION GAP SERPL CALCULATED.3IONS-SCNC: 8 MMOL/L
BASOPHILS # BLD AUTO: 0.05 THOUSANDS/ÂΜL (ref 0–0.1)
BASOPHILS NFR BLD AUTO: 0 % (ref 0–1)
BUN SERPL-MCNC: 34 MG/DL (ref 5–25)
CALCIUM SERPL-MCNC: 9.2 MG/DL (ref 8.4–10.2)
CHLORIDE SERPL-SCNC: 107 MMOL/L (ref 96–108)
CO2 SERPL-SCNC: 21 MMOL/L (ref 21–32)
CREAT SERPL-MCNC: 1.7 MG/DL (ref 0.6–1.3)
EOSINOPHIL # BLD AUTO: 0.17 THOUSAND/ÂΜL (ref 0–0.61)
EOSINOPHIL NFR BLD AUTO: 1 % (ref 0–6)
ERYTHROCYTE [DISTWIDTH] IN BLOOD BY AUTOMATED COUNT: 12.6 % (ref 11.6–15.1)
FLUAV RNA RESP QL NAA+PROBE: NEGATIVE
FLUBV RNA RESP QL NAA+PROBE: NEGATIVE
GFR SERPL CREATININE-BSD FRML MDRD: 41 ML/MIN/1.73SQ M
GLUCOSE SERPL-MCNC: 380 MG/DL (ref 65–140)
HCT VFR BLD AUTO: 38.8 % (ref 36.5–49.3)
HGB BLD-MCNC: 13.5 G/DL (ref 12–17)
IMM GRANULOCYTES # BLD AUTO: 0.07 THOUSAND/UL (ref 0–0.2)
IMM GRANULOCYTES NFR BLD AUTO: 1 % (ref 0–2)
LYMPHOCYTES # BLD AUTO: 2.7 THOUSANDS/ÂΜL (ref 0.6–4.47)
LYMPHOCYTES NFR BLD AUTO: 19 % (ref 14–44)
MCH RBC QN AUTO: 28.6 PG (ref 26.8–34.3)
MCHC RBC AUTO-ENTMCNC: 34.8 G/DL (ref 31.4–37.4)
MCV RBC AUTO: 82 FL (ref 82–98)
MONOCYTES # BLD AUTO: 1.09 THOUSAND/ÂΜL (ref 0.17–1.22)
MONOCYTES NFR BLD AUTO: 8 % (ref 4–12)
NEUTROPHILS # BLD AUTO: 10.3 THOUSANDS/ÂΜL (ref 1.85–7.62)
NEUTS SEG NFR BLD AUTO: 71 % (ref 43–75)
NRBC BLD AUTO-RTO: 0 /100 WBCS
PLATELET # BLD AUTO: 351 THOUSANDS/UL (ref 149–390)
PMV BLD AUTO: 8.7 FL (ref 8.9–12.7)
POTASSIUM SERPL-SCNC: 4.6 MMOL/L (ref 3.5–5.3)
RBC # BLD AUTO: 4.72 MILLION/UL (ref 3.88–5.62)
RSV RNA RESP QL NAA+PROBE: NEGATIVE
S PYO DNA THROAT QL NAA+PROBE: NOT DETECTED
SARS-COV-2 RNA RESP QL NAA+PROBE: NEGATIVE
SODIUM SERPL-SCNC: 136 MMOL/L (ref 135–147)
WBC # BLD AUTO: 14.38 THOUSAND/UL (ref 4.31–10.16)

## 2024-01-05 PROCEDURE — 0241U HB NFCT DS VIR RESP RNA 4 TRGT: CPT | Performed by: EMERGENCY MEDICINE

## 2024-01-05 PROCEDURE — 70491 CT SOFT TISSUE NECK W/DYE: CPT

## 2024-01-05 PROCEDURE — 87651 STREP A DNA AMP PROBE: CPT | Performed by: EMERGENCY MEDICINE

## 2024-01-05 PROCEDURE — 36415 COLL VENOUS BLD VENIPUNCTURE: CPT

## 2024-01-05 PROCEDURE — 85025 COMPLETE CBC W/AUTO DIFF WBC: CPT

## 2024-01-05 PROCEDURE — 80048 BASIC METABOLIC PNL TOTAL CA: CPT

## 2024-01-05 PROCEDURE — 99285 EMERGENCY DEPT VISIT HI MDM: CPT

## 2024-01-05 PROCEDURE — 96361 HYDRATE IV INFUSION ADD-ON: CPT

## 2024-01-05 PROCEDURE — 99284 EMERGENCY DEPT VISIT MOD MDM: CPT

## 2024-01-05 PROCEDURE — G1004 CDSM NDSC: HCPCS

## 2024-01-05 PROCEDURE — 96374 THER/PROPH/DIAG INJ IV PUSH: CPT

## 2024-01-05 RX ORDER — ACETAMINOPHEN 10 MG/ML
1000 INJECTION, SOLUTION INTRAVENOUS ONCE
Status: COMPLETED | OUTPATIENT
Start: 2024-01-05 | End: 2024-01-05

## 2024-01-05 RX ADMIN — ACETAMINOPHEN 1000 MG: 10 INJECTION INTRAVENOUS at 04:10

## 2024-01-05 RX ADMIN — SODIUM CHLORIDE 500 ML: 0.9 INJECTION, SOLUTION INTRAVENOUS at 04:09

## 2024-01-05 RX ADMIN — IOHEXOL 85 ML: 350 INJECTION, SOLUTION INTRAVENOUS at 05:11

## 2024-01-05 NOTE — DISCHARGE INSTRUCTIONS
"Your CT scan shows, \"IMPRESSION:     Abnormal process involving the left vallecula, left aryepiglottic fold, left piriform sinus and left side of the glottis. Marked mass effect on the glottic airway without complete occlusion. Although this may represent an inflammatory or infectious   process without discrete abscess, underlying tumor cannot be excluded. ENT consultation is advised.     Additional chronic findings and negatives as above\".  I have given you a printed copy of the full results.     Follow-up with ENT today. I have given you their address and phone number.  Return to the ED for worsening symptoms.   "

## 2024-01-05 NOTE — ED PROVIDER NOTES
History  Chief Complaint   Patient presents with    Sore Throat     Pt arrived ambulatory with c/o a sore throat that has not gotten any better after amoxicillin       Patient is a 63-year-old male with a history of diabetes, who presents today for sore throat.  Patient was seen here 1/2/24 for similar symptoms.  At that time, he was prescribed amoxicillin.  Patient did not want to wait for a CT scan and was told to follow-up with his PCP and ENT outpatient.  Patient reports concerns as his father passed away from throat cancer.  Today, patient reports worsening sore throat and hoarseness.  Denies any other symptoms.  Denies drooling, difficulty swallowing secretions.  Airway patent without tracheal deviation.  Patient has been taking amoxicillin.  Patient has not tried anything for symptom relief.      Sore Throat  Associated symptoms: voice change (Hoarseness)    Associated symptoms: no abdominal pain, no chest pain, no chills, no cough, no ear pain, no fever, no headaches, no rash, no shortness of breath and no trouble swallowing        Prior to Admission Medications   Prescriptions Last Dose Informant Patient Reported? Taking?   Alcohol Swabs 70 % PADS   No No   Sig: May substitute brand based on insurance coverage. Check glucose ACHS.   HYDROcodone-acetaminophen (NORCO) 5-325 mg per tablet   No No   Sig: Take 1 tablet by mouth every 6 (six) hours as needed for pain for up to 7 doses Max Daily Amount: 4 tablets   Insulin Degludec (TRESIBA FLEXTOUCH SC)   Yes No   Sig: Inject 120 mg under the skin daily   Lancets (freestyle) lancets   No No   Sig: May substitute brand based on insurance coverage. Check glucose ACHS.   amLODIPine (NORVASC) 5 mg tablet   No No   Sig: Take 1 tablet (5 mg total) by mouth daily   amoxicillin (AMOXIL) 500 mg capsule   No No   Sig: Take 1 capsule (500 mg total) by mouth 3 (three) times a day for 10 days   ascorbic acid (VITAMIN C) 1000 MG tablet   No No   Sig: Take 1 tablet (1,000 mg  total) by mouth every 12 (twelve) hours for 6 doses   aspirin 81 mg chewable tablet   Yes No   Sig: Chew 81 mg   benzonatate (TESSALON PERLES) 100 mg capsule   No No   Sig: Take 1 capsule (100 mg total) by mouth 3 (three) times a day as needed for cough   cholecalciferol (VITAMIN D3) 1,000 units tablet   No No   Sig: Take 2 tablets (2,000 Units total) by mouth daily   clopidogrel (PLAVIX) 75 mg tablet   No No   Sig: Take 1 tablet (75 mg total) by mouth daily   glucose blood (FREESTYLE TEST STRIPS) test strip   No No   Sig: May substitute brand based on insurance coverage. Check glucose ACHS.   glucose monitoring kit (FREESTYLE) monitoring kit   No No   Sig: May substitute brand based on insurance coverage. Check glucose ACHS.   insulin detemir (LEVEMIR FLEXTOUCH) 100 Units/mL injection pen   Yes No   Sig: Inject under the skin   lisinopril (ZESTRIL) 10 mg tablet   Yes No   Sig: Take 10 mg by mouth daily   nitroglycerin (NITROSTAT) 0.4 mg SL tablet   No No   Sig: Place 1 tablet (0.4 mg total) under the tongue every 5 (five) minutes as needed for chest pain for up to 10 days   tamsulosin (FLOMAX) 0.4 mg   No No   Sig: Take 1 capsule (0.4 mg total) by mouth daily with dinner      Facility-Administered Medications: None       Past Medical History:   Diagnosis Date    Diabetes mellitus (HCC)     Hypertension        Past Surgical History:   Procedure Laterality Date    HAND SURGERY         History reviewed. No pertinent family history.  I have reviewed and agree with the history as documented.    E-Cigarette/Vaping    E-Cigarette Use Never User      E-Cigarette/Vaping Substances     Social History     Tobacco Use    Smoking status: Never    Smokeless tobacco: Never   Vaping Use    Vaping status: Never Used   Substance Use Topics    Alcohol use: Yes    Drug use: Never       Review of Systems   Constitutional:  Negative for chills and fever.   HENT:  Positive for sore throat and voice change (Hoarseness). Negative for ear  pain and trouble swallowing.    Eyes:  Negative for pain and visual disturbance.   Respiratory:  Negative for cough and shortness of breath.    Cardiovascular:  Negative for chest pain and palpitations.   Gastrointestinal:  Negative for abdominal pain, nausea and vomiting.   Genitourinary:  Negative for dysuria and hematuria.   Musculoskeletal:  Negative for arthralgias and back pain.   Skin:  Negative for color change and rash.   Neurological:  Negative for seizures, syncope and headaches.   Psychiatric/Behavioral:  Negative for confusion.    All other systems reviewed and are negative.      Physical Exam  Physical Exam  Vitals and nursing note reviewed.   Constitutional:       General: He is not in acute distress.     Appearance: He is well-developed.   HENT:      Head: Normocephalic and atraumatic.      Right Ear: Tympanic membrane and ear canal normal.      Left Ear: Tympanic membrane and ear canal normal.      Mouth/Throat:      Mouth: Mucous membranes are moist.      Pharynx: Uvula midline. No oropharyngeal exudate, posterior oropharyngeal erythema or uvula swelling.      Tonsils: No tonsillar exudate or tonsillar abscesses.   Eyes:      Conjunctiva/sclera: Conjunctivae normal.   Cardiovascular:      Rate and Rhythm: Normal rate and regular rhythm.      Heart sounds: No murmur heard.  Pulmonary:      Effort: Pulmonary effort is normal. No respiratory distress.      Breath sounds: Normal breath sounds.   Abdominal:      Palpations: Abdomen is soft.      Tenderness: There is no abdominal tenderness.   Musculoskeletal:         General: No swelling.      Cervical back: Neck supple.   Skin:     General: Skin is warm and dry.      Capillary Refill: Capillary refill takes less than 2 seconds.   Neurological:      Mental Status: He is alert.   Psychiatric:         Mood and Affect: Mood normal.         Vital Signs  ED Triage Vitals [01/05/24 0234]   Temperature Pulse Respirations Blood Pressure SpO2   98.8 °F (37.1 °C)  96 18 (!) 204/97 99 %      Temp Source Heart Rate Source Patient Position - Orthostatic VS BP Location FiO2 (%)   Temporal Monitor Sitting Left arm --      Pain Score       --           Vitals:    01/05/24 0234 01/05/24 0556   BP: (!) 204/97 154/71   Pulse: 96 63   Patient Position - Orthostatic VS: Sitting Sitting         Visual Acuity      ED Medications  Medications   acetaminophen (Ofirmev) injection 1,000 mg (0 mg Intravenous Stopped 1/5/24 0425)   sodium chloride 0.9 % bolus 500 mL (0 mL Intravenous Stopped 1/5/24 0509)   iohexol (OMNIPAQUE) 350 MG/ML injection (MULTI-DOSE) 85 mL (85 mL Intravenous Given 1/5/24 0511)       Diagnostic Studies  Results Reviewed       Procedure Component Value Units Date/Time    Basic metabolic panel [[id number]]  (Abnormal) Collected: 01/05/24 0408    Lab Status: Final result Specimen: Blood from Arm, Right Updated: 01/05/24 0446     Sodium 136 mmol/L      Potassium 4.6 mmol/L      Chloride 107 mmol/L      CO2 21 mmol/L      ANION GAP 8 mmol/L      BUN 34 mg/dL      Creatinine 1.70 mg/dL      Glucose 380 mg/dL      Calcium 9.2 mg/dL      eGFR 41 ml/min/1.73sq m     Narrative:      National Kidney Disease Foundation guidelines for Chronic Kidney Disease (CKD):     Stage 1 with normal or high GFR (GFR > 90 mL/min/1.73 square meters)    Stage 2 Mild CKD (GFR = 60-89 mL/min/1.73 square meters)    Stage 3A Moderate CKD (GFR = 45-59 mL/min/1.73 square meters)    Stage 3B Moderate CKD (GFR = 30-44 mL/min/1.73 square meters)    Stage 4 Severe CKD (GFR = 15-29 mL/min/1.73 square meters)    Stage 5 End Stage CKD (GFR <15 mL/min/1.73 square meters)  Note: GFR calculation is accurate only with a steady state creatinine    CBC and differential [[id number]]  (Abnormal) Collected: 01/05/24 0408    Lab Status: Final result Specimen: Blood from Arm, Right Updated: 01/05/24 0416     WBC 14.38 Thousand/uL      RBC 4.72 Million/uL      Hemoglobin 13.5 g/dL      Hematocrit 38.8 %      MCV 82 fL       MCH 28.6 pg      MCHC 34.8 g/dL      RDW 12.6 %      MPV 8.7 fL      Platelets 351 Thousands/uL      nRBC 0 /100 WBCs      Neutrophils Relative 71 %      Immat GRANS % 1 %      Lymphocytes Relative 19 %      Monocytes Relative 8 %      Eosinophils Relative 1 %      Basophils Relative 0 %      Neutrophils Absolute 10.30 Thousands/µL      Immature Grans Absolute 0.07 Thousand/uL      Lymphocytes Absolute 2.70 Thousands/µL      Monocytes Absolute 1.09 Thousand/µL      Eosinophils Absolute 0.17 Thousand/µL      Basophils Absolute 0.05 Thousands/µL     FLU/RSV/COVID - if FLU/RSV clinically relevant [[id number]]  (Normal) Collected: 01/05/24 0241    Lab Status: Final result Specimen: Nares from Nose Updated: 01/05/24 0328     SARS-CoV-2 Negative     INFLUENZA A PCR Negative     INFLUENZA B PCR Negative     RSV PCR Negative    Narrative:      FOR PEDIATRIC PATIENTS - copy/paste COVID Guidelines URL to browser: [email or web address]    SARS-CoV-2 assay is a Nucleic Acid Amplification assay intended for the  qualitative detection of nucleic acid from SARS-CoV-2 in nasopharyngeal  swabs. Results are for the presumptive identification of SARS-CoV-2 RNA.    Positive results are indicative of infection with SARS-CoV-2, the virus  causing COVID-19, but do not rule out bacterial infection or co-infection  with other viruses. Laboratories within the [place] and its  territories are required to report all positive results to the appropriate  public health authorities. Negative results do not preclude SARS-CoV-2  infection and should not be used as the sole basis for treatment or other  patient management decisions. Negative results must be combined with  clinical observations, patient history, and epidemiological information.  This test has not been FDA cleared or approved.    This test has been authorized by FDA under an Emergency Use Authorization  (EUA). This test is only  authorized for the duration of time the  declaration that circumstances exist justifying the authorization of the  emergency use of an in vitro diagnostic tests for detection of SARS-CoV-2  virus and/or diagnosis of COVID-19 infection under section 564(b)(1) of  the Act, 21 U.S.C. 360bbb-3(b)(1), unless the authorization is terminated  or revoked sooner. The test has been validated but independent review by FDA  and CLIA is pending.    Test performed using [employer]: This RT-PCR assay targets N2,  a region unique to SARS-CoV-2. A conserved region in the E-gene was chosen  for pan-Sarbecovirus detection which includes SARS-CoV-2.    According to CMS-2020-01-R, this platform meets the definition of high-throughput technology.    Strep A PCR [[id number]]  (Normal) Collected: 01/05/24 0241    Lab Status: Final result Specimen: Throat Updated: 01/05/24 0313     STREP A PCR Not Detected                   CT soft tissue neck with contrast   Final Result by David Allen MD (01/05 0603)      Abnormal process involving the left vallecula, left aryepiglottic fold, left piriform sinus and left side of the glottis. Marked mass effect on the glottic airway without complete occlusion. Although this may represent an inflammatory or infectious    process without discrete abscess, underlying tumor cannot be excluded. ENT consultation is advised.      Additional chronic findings and negatives as above.         I personally discussed this study with CHELSEY BERRIOS on 1/5/2024 at 05:58      Workstation performed: [id number]                    Procedures  Procedures         ED Course  ED Course as of 01/05/24 0649   Fri Jan 05, 2024   0417 WBC(!): 14.38   0502 Glucose, Random(!): 380   0503 Creatinine(!): 1.70                               SBIRT 22yo+      Flowsheet Row Most Recent Value   Initial Alcohol Screen: US AUDIT-C     1. How often do you have a drink containing alcohol? 0 Filed at: 01/05/2024 0237   2.  "How many drinks containing alcohol do you have on a typical day you are drinking?  0 Filed at: 01/05/2024 0237   3a. Male UNDER 65: How often do you have five or more drinks on one occasion? 0 Filed at: 01/05/2024 0237   Audit-C Score 0 Filed at: 01/05/2024 0237   TRACE: How many times in the past year have you...    Used an illegal drug or used a prescription medication for non-medical reasons? Never Filed at: 01/05/2024 0237                      Medical Decision Making  Patient is a 63-year-old male who presents today for sore throat, worse on the left side. Associated hoarseness. Father recently passed due to throat cancer. Unremarkable physical exam. No drooling, patient able to tolerate swallowing his secretions. Patient able to eat and drink, but reports pain with swallowing. He was seen 1/2/24 for sore throat and left prior to results- he was given amoxicillin on discharge. Strep and viral swabs negative. WBC 14.3. Glucose 380. Creatinine 1.70. Patient given fluids and Tylenol here with symptom improvement. CT soft tissue neck with contrast shows, \"MPRESSION:    Abnormal process involving the left vallecula, left aryepiglottic fold, left piriform sinus and left side of the glottis. Marked mass effect on the glottic airway without complete occlusion. Although this may represent an inflammatory or infectious  process without discrete abscess, underlying tumor cannot be excluded. ENT consultation is advised.    Additional chronic findings and negatives as above\".  I reached out to ENT via [place] text.  They report they will see him in office today.  I gave patient information for their office.  They will call patient to schedule an appointment today.  Patient to follow up with his PCP. Patient to return to the emergency room for worsening symptoms.  Patient understands and agrees to discharge plan at this time.    Problems Addressed:  Abnormal CT scan: acute illness or injury  Sore throat: acute illness or " injury    Amount and/or Complexity of Data Reviewed  Labs: ordered. Decision-making details documented in ED Course.  Radiology: ordered.    Risk  Prescription drug management.             Disposition  Final diagnoses:   Sore throat   Abnormal CT scan   Elevated serum creatinine     Time reflects when diagnosis was documented in both MDM as applicable and the Disposition within this note       Time User Action Codes Description Comment    1/5/2024  6:20 AM Sallie Ibarra [J02.9] Sore throat     1/5/2024  6:20 AM Sallie Ibarra Add [R93.89] Abnormal CT scan     1/5/2024  6:49 AM Sallie Ibarra Add [R79.89] Elevated serum creatinine           ED Disposition       ED Disposition   Discharge    Condition   Stable    Date/Time   Fri Jan 5, 2024 0620    Comment   Blair Caldwell discharge to home/self care.                   Follow-up Information       Follow up With Specialties Details Why Contact Info Additional Information    Blair Lozada MD Family Medicine   [address] [facility]  [address]  [place] [id number] [phone]       On license of [facility] Emergency Medicine Go to  If symptoms worsen [address] [place] [id number]  [phone] On license of [facility], [address] [place], [place], [address]    Felipe Morrison MD Otolaryngology   [address] [facility]  [address]  [place] [address]               Discharge Medication List as of 1/5/2024  6:31 AM        CONTINUE these medications which have NOT CHANGED    Details   Alcohol Swabs 70 % PADS May substitute brand based on insurance coverage. Check glucose ACHS., Normal      amLODIPine (NORVASC) 5 mg tablet Take 1 tablet (5 mg total) by mouth daily, Starting Sat 5/1/2021, Until Mon 5/31/2021, Normal      amoxicillin (AMOXIL) 500 mg capsule Take 1 capsule (500 mg total) by mouth 3 (three) times a day for 10 days, Starting Wed 1/3/2024, Until Sat 1/13/2024, Normal       ascorbic acid (VITAMIN C) 1000 MG tablet Take 1 tablet (1,000 mg total) by mouth every 12 (twelve) hours for 6 doses, Starting Sat 5/1/2021, Until Tue 5/4/2021, Normal      aspirin 81 mg chewable tablet Chew 81 mg, Historical Med      benzonatate (TESSALON PERLES) 100 mg capsule Take 1 capsule (100 mg total) by mouth 3 (three) times a day as needed for cough, Starting Sat 5/1/2021, Normal      cholecalciferol (VITAMIN D3) 1,000 units tablet Take 2 tablets (2,000 Units total) by mouth daily, Starting Sat 5/1/2021, Until Mon 5/31/2021, Normal      clopidogrel (PLAVIX) 75 mg tablet Take 1 tablet (75 mg total) by mouth daily, Starting Tue 8/24/2021, Until Mon 11/22/2021, Normal      glucose blood (FREESTYLE TEST STRIPS) test strip May substitute brand based on insurance coverage. Check glucose ACHS., Normal      glucose monitoring kit (FREESTYLE) monitoring kit May substitute brand based on insurance coverage. Check glucose ACHS., Normal      HYDROcodone-acetaminophen (NORCO) 5-325 mg per tablet Take 1 tablet by mouth every 6 (six) hours as needed for pain for up to 7 doses Max Daily Amount: 4 tablets, Starting Fri 10/27/2023, Normal      Insulin Degludec (TRESIBA FLEXTOUCH SC) Inject 120 mg under the skin daily, Historical Med      insulin detemir (LEVEMIR FLEXTOUCH) 100 Units/mL injection pen Inject under the skin, Historical Med      Lancets (freestyle) lancets May substitute brand based on insurance coverage. Check glucose ACHS., Normal      lisinopril (ZESTRIL) 10 mg tablet Take 10 mg by mouth daily, Historical Med      nitroglycerin (NITROSTAT) 0.4 mg SL tablet Place 1 tablet (0.4 mg total) under the tongue every 5 (five) minutes as needed for chest pain for up to 10 days, Starting Mon 8/23/2021, Until Thu 9/2/2021 at 2359, Normal      tamsulosin (FLOMAX) 0.4 mg Take 1 capsule (0.4 mg total) by mouth daily with dinner, Starting Mon 8/23/2021, Until Wed 9/22/2021, Normal                 PDMP Review         Value  Time User    PDMP Reviewed  Yes 1/5/2024  6:49 AM Sallie Ibarra PA-C            ED Provider  Electronically Signed by             Sallie Ibarra PA-C  01/05/24 0650

## 2024-01-15 ENCOUNTER — OFFICE VISIT (OUTPATIENT)
Dept: FAMILY MEDICINE CLINIC | Facility: CLINIC | Age: 64
End: 2024-01-15
Payer: COMMERCIAL

## 2024-01-15 VITALS
DIASTOLIC BLOOD PRESSURE: 78 MMHG | OXYGEN SATURATION: 100 % | WEIGHT: 220.5 LBS | HEART RATE: 64 BPM | BODY MASS INDEX: 32.66 KG/M2 | SYSTOLIC BLOOD PRESSURE: 132 MMHG | TEMPERATURE: 98.1 F | HEIGHT: 69 IN

## 2024-01-15 DIAGNOSIS — Z79.4 TYPE 2 DIABETES MELLITUS WITH OTHER DIABETIC KIDNEY COMPLICATION, WITH LONG-TERM CURRENT USE OF INSULIN (HCC): Primary | ICD-10-CM

## 2024-01-15 DIAGNOSIS — I10 ESSENTIAL HYPERTENSION: ICD-10-CM

## 2024-01-15 DIAGNOSIS — N18.30 STAGE 3 CHRONIC KIDNEY DISEASE, UNSPECIFIED WHETHER STAGE 3A OR 3B CKD (HCC): ICD-10-CM

## 2024-01-15 DIAGNOSIS — Z12.11 SCREENING FOR COLON CANCER: ICD-10-CM

## 2024-01-15 DIAGNOSIS — I25.10 CORONARY ARTERY DISEASE INVOLVING NATIVE HEART WITHOUT ANGINA PECTORIS, UNSPECIFIED VESSEL OR LESION TYPE: ICD-10-CM

## 2024-01-15 DIAGNOSIS — E11.29 TYPE 2 DIABETES MELLITUS WITH OTHER DIABETIC KIDNEY COMPLICATION, WITH LONG-TERM CURRENT USE OF INSULIN (HCC): Primary | ICD-10-CM

## 2024-01-15 DIAGNOSIS — J04.30 SUPRAGLOTTITIS WITHOUT AIRWAY OBSTRUCTION: ICD-10-CM

## 2024-01-15 PROBLEM — U07.1 COVID-19 VIRUS INFECTION: Status: RESOLVED | Noted: 2021-04-27 | Resolved: 2024-01-15

## 2024-01-15 LAB
CREAT UR-MCNC: 85.8 MG/DL
MICROALBUMIN UR-MCNC: 186.7 MG/L
MICROALBUMIN/CREAT 24H UR: 218 MG/G CREATININE (ref 0–30)
SL AMB POCT HEMOGLOBIN AIC: 10.9 (ref ?–6.5)

## 2024-01-15 PROCEDURE — 82570 ASSAY OF URINE CREATININE: CPT | Performed by: NURSE PRACTITIONER

## 2024-01-15 PROCEDURE — 82043 UR ALBUMIN QUANTITATIVE: CPT | Performed by: NURSE PRACTITIONER

## 2024-01-15 PROCEDURE — 83036 HEMOGLOBIN GLYCOSYLATED A1C: CPT | Performed by: NURSE PRACTITIONER

## 2024-01-15 PROCEDURE — 99204 OFFICE O/P NEW MOD 45 MIN: CPT | Performed by: NURSE PRACTITIONER

## 2024-01-15 RX ORDER — INSULIN ASPART 100 [IU]/ML
INJECTION, SOLUTION INTRAVENOUS; SUBCUTANEOUS
Qty: 8.1 ML | Refills: 1 | Status: SHIPPED | OUTPATIENT
Start: 2024-01-15

## 2024-01-15 RX ORDER — INSULIN ASPART 100 [IU]/ML
3 INJECTION, SOLUTION INTRAVENOUS; SUBCUTANEOUS
Qty: 8.1 ML | Refills: 1 | Status: SHIPPED | OUTPATIENT
Start: 2024-01-15 | End: 2024-01-15 | Stop reason: CLARIF

## 2024-01-15 RX ORDER — LISINOPRIL 20 MG/1
20 TABLET ORAL DAILY
Start: 2024-01-15 | End: 2024-01-16 | Stop reason: SDUPTHER

## 2024-01-15 RX ORDER — PEN NEEDLE, DIABETIC 32GX 5/32"
NEEDLE, DISPOSABLE MISCELLANEOUS
Qty: 300 EACH | Refills: 3 | Status: SHIPPED | OUTPATIENT
Start: 2024-01-15 | End: 2024-01-16

## 2024-01-15 NOTE — ASSESSMENT & PLAN NOTE
Lab Results   Component Value Date    HGBA1C 10.9 (A) 01/15/2024   Diabetes uncontrolled.  Reviewed goal of less than 7.  To continue Tresiba.  Will add NovoLog with meals.  To begin checking blood sugars.  Stressed the importance of consuming a low-carb diet and engaging in daily physical activity.

## 2024-01-15 NOTE — ASSESSMENT & PLAN NOTE
Lab Results   Component Value Date    EGFR 41 01/05/2024    EGFR 43 01/02/2024    EGFR 56 08/23/2021    CREATININE 1.70 (H) 01/05/2024    CREATININE 1.63 (H) 01/02/2024    CREATININE 1.34 (H) 08/23/2021   Creatinine elevated.  To avoid nephrotoxic substances.  Stressed the importance of adequately controlling diabetes.  Provided referral to nephrology.

## 2024-01-15 NOTE — PATIENT INSTRUCTIONS
Isaiah Cano Patient Age: 42 year old  MESSAGE:   [facility] calling stating the patient's insurance no longer uses [facility] for the Humira. pharmacy requesting a verbal or a script faxed over. Fax number [phone]. MESSAGE CONFIRMED WITH CALLER       WEIGHT AND HEIGHT:   Wt Readings from Last 1 Encounters:   07/22/21 (!) 140.6 kg (310 lb)     Ht Readings from Last 1 Encounters:   07/22/21 6' (1.829 m)     BMI Readings from Last 1 Encounters:   07/22/21 42.04 kg/m²       ALLERGIES:  Patient has no known allergies.  Current Outpatient Medications   Medication   • ADALimumab (Humira Pen) 40 MG/0.4ML citrate free   • amLODIPine (NORVASC) 5 MG tablet     No current facility-administered medications for this visit.     [place] [address] [place] [address]       Pharmacy preference(s) on file:   [id number] - [place], [place] - [address] [facility] [address]  [address]  [facility] [address]  Phone: [phone] Fax: [phone]    [facility] [address]  [address] [facility]  [address]  [place] [id number]  Phone: [phone] Fax: [phone]      CALL BACK INFO: Ok to leave response (including medical information) on answering machine  ROUTING: Patient's physician/staff        PCP: Verify Pcp         INS: Payor: [employer]UE [employer] [place] / Plan: BLUE [id number] / Product Type: PPO MISC   PATIENT ADDRESS:  [address] [place] [id number]   Type 2 Diabetes Management for Adults   AMBULATORY CARE:   Type 2 diabetes  is a disease that affects how your body uses glucose (sugar). Either your body cannot make enough insulin, or it cannot use the insulin correctly. It is important to keep diabetes controlled to prevent damage to your heart, blood vessels, and other organs. Management will help you feel well and enjoy your daily activities. Your diabetes care team providers can help you make a plan to fit diabetes care into your schedule. Your plan can change over time to fit your needs and your family's needs.       Have someone call your local emergency number (911 in the [place]) if:   You cannot be woken.    You have signs of diabetic ketoacidosis:     confusion, fatigue    vomiting    rapid heartbeat    fruity smelling breath    extreme thirst    dry mouth and skin    You have any of the following signs of a heart attack:      Squeezing, pressure, or pain in your chest    You may  also have any of the following:     Discomfort or pain in your back, neck, jaw, stomach, or arm    Shortness of breath    Nausea or vomiting    Lightheadedness or a sudden cold sweat    You have any of the following signs of a stroke:      Numbness or drooping on one side of your face     Weakness in an arm or leg    Confusion or difficulty speaking    Dizziness, a severe headache, or vision loss    Call your doctor or diabetes care team provider if:   You have a sore or wound that will not heal.    You have a change in the amount you urinate.    Your blood sugar levels are higher than your target goals.    You often have lower blood sugar levels than your target goals.    Your skin is red, dry, warm, or swollen.    You have trouble coping with diabetes, or you feel anxious or depressed.    You have trouble following any part of your care plan, such as your meal plan.    You have questions or concerns about your condition or care.    What you need to know about high blood sugar  levels:  High blood sugar levels may not cause any symptoms. You may feel more thirsty or urinate more often than usual. Over time, high blood sugar levels can damage your nerves, blood vessels, tissues, and organs. The following can increase your blood sugar levels:  Large meals or large amounts of carbohydrates at one time    Less physical activity    Stress    Illness    A lower dose of diabetes medicine or insulin, or a late dose    What you need to know about low blood sugar levels:  Symptoms include feeling shaky, dizzy, irritable, or confused. You can prevent symptoms by keeping your blood sugar levels from going too low.  Treat a low blood sugar level right away:      Drink 4 ounces of juice or have 1 tube of glucose gel.    Check your blood sugar level again 10 to 15 minutes later.    When the level goes back to normal, eat a meal or snack to prevent another decrease.       Keep glucose gel, raisins, or hard candy with you at all times to treat a low blood sugar level.     Your blood sugar level can get too low if you take diabetes medicine or insulin and do not eat enough food.     If you use insulin, check your blood sugar level before you exercise.      If your blood sugar level is below 100 mg/dL, eat 4 crackers or 2 ounces of raisins, or drink 4 ounces of juice.    Check your level every 30 minutes if you exercise longer than 1 hour.    You may need a snack during or after exercise.    What you can do to manage your blood sugar levels:   Check your blood sugar levels as directed and as needed.  Several items are available to use to check your levels. You may need to check by testing a drop of blood in a glucose monitor. You may instead be given a continuous glucose monitoring (CGM) device. The device is worn at all times. The CGM checks your blood sugar level every 5 minutes. It sends results to an electronic device such as a smart phone. A CGM can be used with or without an insulin pump. You and your  diabetes care team providers will decide on the best method for you. The goal for blood sugar levels before meals  is between 80 and 130 mg/dL and 2 hours after eating  is lower than 180 mg/dL.            Make healthy food choices.  Work with a dietitian to create a meal plan that works for you and your schedule. A dietitian can help you learn how to eat the right amount of carbohydrates (sugar and starchy foods) during your meals and snacks. Examples of carbohydrates are breads, cereals, rice, pasta, fruit, low-fat dairy, and sweets. Carbohydrates can raise your blood sugar level if you eat too many at one time.         Eat high-fiber foods as directed.  Fiber helps improve blood sugar levels. Fiber also lowers your risk for heart disease and other problems diabetes can cause. Examples of high-fiber foods include vegetables, whole-grain bread, and beans such as larios beans. Your dietitian can tell you how much fiber to have each day.         Get regular physical activity.  Physical activity can help you get to your target blood sugar level goal and manage your weight. Get at least 150 minutes of moderate to vigorous aerobic physical activity each week. Resistance training, such as lifting weights, should be done 3 times each week. Do not miss more than 2 days of physical activity in a row. Do not sit longer than 30 minutes at a time. Your healthcare provider can help you create an activity plan. The plan can include the best activities for you and can help you build your strength and endurance.            Maintain a healthy weight.  Ask your team what a healthy weight is for you. A healthy weight can help you control diabetes and prevent heart disease. Ask your team to help you create a weight-loss plan, if needed. Even a loss of 3% to 7% of your excess body weight can help make a difference in managing diabetes. Your team will help you set a weight-loss goal, such as 10 to 15 pounds, or 5% of your extra weight.  Together you and your team can set manageable weight-loss goals.    Take your diabetes medicine or insulin as directed.  You may need diabetes medicine, insulin, or both to help control your blood sugar levels. Your healthcare provider will teach you how and when to take your diabetes medicine or insulin. You will also be taught about side effects oral diabetes medicine can cause. Insulin may be injected or given through a pump or pen. You and your providers will decide on the best method for you:    An insulin pump  is an implanted device that gives your insulin 24 hours a day. An insulin pump prevents the need for multiple insulin injections in a day.         An insulin pen  is a device prefilled with the right amount of insulin.         You and your family members will be taught how to draw up and give insulin  if this is the best method for you. Your providers will also teach you how to dispose of needles and syringes.    You will learn how much insulin you need  and when to give it. You will be taught when not to give insulin. You will also be taught what to do if your blood sugar level drops too low. This may happen if you take insulin and do not eat the right amount of carbohydrates.    More ways to manage type 2 diabetes:   Wear medical alert identification.  Wear medical alert jewelry or carry a card that says you have diabetes. Ask your provider where to get these items.         Do not smoke.  Nicotine and other chemicals in cigarettes and cigars can cause lung and blood vessel damage. It also makes it more difficult to manage your diabetes. Ask your provider for information if you currently smoke and need help to quit. Do not use e-cigarettes or smokeless tobacco in place of cigarettes or to help you quit. They still contain nicotine.    Check your feet each day for cuts, scratches, calluses, or other wounds.  Look for redness and swelling, and feel for warmth. Wear shoes that fit well. Check your shoes  for rocks or other objects that can hurt your feet. Do not walk barefoot or wear shoes without socks. Wear cotton socks to help keep your feet dry.         Ask about vaccines you may need.  You have a higher risk for serious illness if you get the flu, pneumonia, COVID-19, or hepatitis. Ask your provider if you should get vaccines to prevent these or other diseases, and when to get the vaccines.    Talk to your provider if you become stressed about diabetes care.  Sometimes being able to fit diabetes care into your life can cause increased stress. The stress can cause you not to take care of yourself properly. Your provider can help by offering tips about self-care. A mental health provider can listen and offer help with self-care issues. Other types of counseling can help you make nutrition or physical activity changes.    Have your A1c checked as directed.  Your provider may check your A1c every 3 months, or 2 times each year if your diabetes is controlled. An A1c test shows the average amount of sugar in your blood over the past 2 to 3 months. Your provider will tell you what your A1c level should be.    Have screening tests as directed.  Your provider may recommend screening for complications of diabetes and other conditions that may develop. Some screenings may begin right away and some may happen within the first 5 years of diagnosis:    Examples of diabetes complications  include kidney problems, high cholesterol, high blood pressure, blood vessel problems, eye problems, and sleep apnea.    You may be screened for a low vitamin B level  if you take oral diabetes medicine for a long time.    You may be screened for polycystic ovarian syndrome (PCOS)  if you are of childbearing age.    Follow up with your doctor or diabetes care team providers as directed:  You may need to have blood tests done before your follow-up visit. The test results will show if changes need to be made in your treatment or self-care.  Talk to your provider if you cannot afford your medicine. Write down your questions so you remember to ask them during your visits.  © Copyright [employer] 2023 Information is for End User's use only and may not be sold, redistributed or otherwise used for commercial purposes.  The above information is an  only. It is not intended as medical advice for individual conditions or treatments. Talk to your doctor, nurse or pharmacist before following any medical regimen to see if it is safe and effective for you.

## 2024-01-15 NOTE — ASSESSMENT & PLAN NOTE
Blood pressure stable, to continue current antihypertensive regimen.  Counseled on the importance of maintaining a healthy weight and consuming a low-sodium diet.

## 2024-01-15 NOTE — PROGRESS NOTES
Assessment/Plan:    Type 2 diabetes mellitus with other diabetic kidney complication, with long-term current use of insulin (HCC)    Lab Results   Component Value Date    HGBA1C 10.9 (A) 01/15/2024   Diabetes uncontrolled.  Reviewed goal of less than 7.  To continue Tresiba.  Will add NovoLog with meals.  To begin checking blood sugars.  Stressed the importance of consuming a low-carb diet and engaging in daily physical activity.      Essential hypertension  Blood pressure stable, to continue current antihypertensive regimen.  Counseled on the importance of maintaining a healthy weight and consuming a low-sodium diet.     CKD (chronic kidney disease)  Lab Results   Component Value Date    EGFR 41 01/05/2024    EGFR 43 01/02/2024    EGFR 56 08/23/2021    CREATININE 1.70 (H) 01/05/2024    CREATININE 1.63 (H) 01/02/2024    CREATININE 1.34 (H) 08/23/2021   Creatinine elevated.  To avoid nephrotoxic substances.  Stressed the importance of adequately controlling diabetes.  Provided referral to nephrology.    Supraglottitis without airway obstruction  To continue clindamycin.  To keep upcoming appointment with ENT.       Diagnoses and all orders for this visit:    Type 2 diabetes mellitus with other diabetic kidney complication, with long-term current use of insulin ([facility])  -     POCT hemoglobin A1c  -     Ambulatory Referral to Endocrinology; Future  -     Glucometer  -     Glucometer test strips  -     Lancets  -     Cancel: POCT urine microalbumin/creatinine  -     Cancel: IRIS Diabetic eye exam  -     IRIS Diabetic eye exam  -     Insulin Pen Needle (BD Pen Needle Kaity U/F) 32G X 4 MM MISC; Use three times daily as directed with insulin pen  -     Discontinue: insulin aspart (NovoLOG FlexPen) 100 UNIT/ML injection pen; Inject 3 Units under the skin 3 (three) times a day with meals  -     insulin aspart (NovoLOG FlexPen) 100 UNIT/ML injection pen; Sliding Scale: 0-149=0 units, 150-199=2 units, 200-249=4 units,  250-299=6 units, 300-349=8 units, 350-399=10 units, >400=Call office  -     Albumin / creatinine urine ratio    Essential hypertension  -     lisinopril (ZESTRIL) 20 mg tablet; Take 1 tablet (20 mg total) by mouth daily    Stage 3 chronic kidney disease, unspecified whether stage 3a or 3b CKD (HCC)  -     Ambulatory Referral to Nephrology; Future    Screening for colon cancer  -     Cologuard    Coronary artery disease involving native heart without angina pectoris, unspecified vessel or lesion type  -     Ambulatory Referral to Cardiology; Future    Supraglottitis without airway obstruction          Subjective:      Patient ID: Blair Caldwell is a 63 y.o. male.    Blair presents for an initial visit.  He has a past medical history of diabetes, enlarged prostate, hypertension, coronary artery disease requiring stent placement, and myocardial infarction.  He has a past surgical history of hand surgery and a tonsillectomy.  He was recently started on clindamycin due to supraglottitis versus a mass.  He is due to complete his clindamycin in the next couple days.  He feels as though his symptoms are improving gradually but still present.  He has a follow-up with ENT on 1/29/2024.  He would like to proceed with a biopsy if indicated.  In regards to his diabetes, Blair is taking Tresiba as prescribed.  He does not check his blood sugar unless his vision is blurred.  He reports to having a stent placed in [place].  He is not currently connected with a cardiologist.  Denies chest pain, shortness of breath, or fatigue.  He is requesting Norco for aches and pains.  He attributes his elevated creatinine to overuse of aspirin due to his joint pain.  He is taking his antihypertensive as prescribed.        The following portions of the patient's history were reviewed and updated as appropriate: He   Patient Active Problem List    Diagnosis Date Noted    Type 2 diabetes mellitus with other diabetic kidney complication, with  long-term current use of insulin ([facility]) 01/15/2024    Supraglottitis without airway obstruction 01/15/2024    Kidney stone 08/22/2021    Coronary artery disease 08/22/2021    Class 2 obesity due to excess calories without serious comorbidity with body mass index (BMI) of 36.0 to 36.9 in adult 04/27/2021    Chest pain 04/26/2021    CKD (chronic kidney disease) 04/26/2021    Type 2 diabetes mellitus with kidney complication, with long-term current use of insulin ([facility]) 06/29/2014    Essential hypertension 06/29/2014     Current Outpatient Medications   Medication Sig Dispense Refill    atorvastatin (LIPITOR) 10 mg tablet Take 10 mg by mouth daily      clopidogrel (PLAVIX) 75 mg tablet Take 1 tablet (75 mg total) by mouth daily 90 tablet 0    insulin aspart (NovoLOG FlexPen) 100 UNIT/ML injection pen Sliding Scale: 0-149=0 units, 150-199=2 units, 200-249=4 units, 250-299=6 units, 300-349=8 units, 350-399=10 units, >400=Call office 8.1 mL 1    Insulin Degludec (TRESIBA FLEXTOUCH SC) Inject 120 mg under the skin daily      Insulin Glargine w/ Trans Port 100 UNIT/ML SOPN Inject 120 Units under the skin daily      Insulin Pen Needle (BD Pen Needle Kaity U/F) 32G X 4 MM MISC Use three times daily as directed with insulin pen 300 each 3    Lancets (freestyle) lancets May substitute brand based on insurance coverage. Check glucose ACHS. 200 each 0    lisinopril (ZESTRIL) 20 mg tablet Take 1 tablet (20 mg total) by mouth daily      amLODIPine (NORVASC) 5 mg tablet Take 1 tablet (5 mg total) by mouth daily 30 tablet 0    ascorbic acid (VITAMIN C) 1000 MG tablet Take 1 tablet (1,000 mg total) by mouth every 12 (twelve) hours for 6 doses 6 tablet 0    cholecalciferol (VITAMIN D3) 1,000 units tablet Take 2 tablets (2,000 Units total) by mouth daily 60 tablet 0    clindamycin (CLEOCIN) 300 MG capsule Take 1 capsule (300 mg total) by mouth 4 (four) times a day for 14 days (Patient not taking: Reported on 1/15/2024) 56 capsule 0     "glucose blood (FREESTYLE TEST STRIPS) test strip May substitute brand based on insurance coverage. Check glucose ACHS. 200 each 0    glucose monitoring kit (FREESTYLE) monitoring kit May substitute brand based on insurance coverage. Check glucose ACHS. 1 each 0    nitroglycerin (NITROSTAT) 0.4 mg SL tablet Place 1 tablet (0.4 mg total) under the tongue every 5 (five) minutes as needed for chest pain for up to 10 days 10 tablet 0     No current facility-administered medications for this visit.     He is allergic to cat hair extract..    Review of Systems   Constitutional:  Positive for fatigue.   HENT: Negative.     Eyes:  Positive for visual disturbance (blurred vision at times).   Respiratory: Negative.     Cardiovascular: Negative.    Gastrointestinal: Negative.    Endocrine: Negative.    Genitourinary:         Nocturia    Musculoskeletal:  Positive for arthralgias.   Skin:         Mass on neck   Allergic/Immunologic: Negative.    Neurological: Negative.    Hematological: Negative.    Psychiatric/Behavioral: Negative.           /78   Pulse 64   Temp 98.1 °F (36.7 °C)   Ht 5' 9\" (1.753 m)   Wt 100 kg (220 lb 8 oz)   SpO2 100%   BMI 32.56 kg/m²     Objective:     Physical Exam  Vitals and nursing note reviewed.   Constitutional:       General: He is not in acute distress.     Appearance: Normal appearance. He is well-developed.   HENT:      Head: Normocephalic and atraumatic.      Right Ear: Hearing, tympanic membrane and external ear normal.      Left Ear: Hearing, tympanic membrane and external ear normal.      Nose: Nose normal.      Mouth/Throat:      Mouth: Mucous membranes are moist.      Dentition: Abnormal dentition. Dental caries present.      Pharynx: Oropharynx is clear. Uvula midline.   Eyes:      General: Lids are normal.      Conjunctiva/sclera: Conjunctivae normal.   Neck:      Trachea: Trachea normal.     Cardiovascular:      Rate and Rhythm: Normal rate and regular rhythm.      Heart " sounds: Normal heart sounds. No murmur heard.  Pulmonary:      Effort: Pulmonary effort is normal. No respiratory distress.      Breath sounds: Normal breath sounds. No wheezing or rales.   Chest:      Chest wall: No tenderness.   Abdominal:      General: Bowel sounds are normal. There is no distension.      Palpations: Abdomen is soft. There is no mass.      Tenderness: There is no abdominal tenderness. There is no guarding or rebound.   Musculoskeletal:         General: No tenderness or deformity. Normal range of motion.      Cervical back: Normal range of motion and neck supple.      Comments: Deformity of left hand   Lymphadenopathy:      Cervical: No cervical adenopathy.   Skin:     General: Skin is warm and dry.      Coloration: Skin is not pale.      Findings: No erythema or rash.   Neurological:      General: No focal deficit present.      Mental Status: He is alert and oriented to person, place, and time.      Cranial Nerves: No cranial nerve deficit.   Psychiatric:         Mood and Affect: Mood normal.         Behavior: Behavior normal.         Thought Content: Thought content normal.         Judgment: Judgment normal.

## 2024-01-16 ENCOUNTER — OFFICE VISIT (OUTPATIENT)
Dept: FAMILY MEDICINE CLINIC | Facility: CLINIC | Age: 64
End: 2024-01-16
Payer: COMMERCIAL

## 2024-01-16 ENCOUNTER — TELEPHONE (OUTPATIENT)
Dept: ENDOCRINOLOGY | Facility: CLINIC | Age: 64
End: 2024-01-16

## 2024-01-16 ENCOUNTER — TELEPHONE (OUTPATIENT)
Dept: NEPHROLOGY | Facility: CLINIC | Age: 64
End: 2024-01-16

## 2024-01-16 VITALS — HEIGHT: 69 IN | WEIGHT: 220 LBS | BODY MASS INDEX: 32.58 KG/M2

## 2024-01-16 DIAGNOSIS — E55.9 VITAMIN D DEFICIENCY: Primary | ICD-10-CM

## 2024-01-16 DIAGNOSIS — I25.10 CORONARY ARTERY DISEASE: ICD-10-CM

## 2024-01-16 DIAGNOSIS — Z79.4 TYPE 2 DIABETES MELLITUS WITH OTHER DIABETIC KIDNEY COMPLICATION, WITH LONG-TERM CURRENT USE OF INSULIN (HCC): Primary | ICD-10-CM

## 2024-01-16 DIAGNOSIS — E11.29 TYPE 2 DIABETES MELLITUS WITH OTHER DIABETIC KIDNEY COMPLICATION, WITH LONG-TERM CURRENT USE OF INSULIN (HCC): ICD-10-CM

## 2024-01-16 DIAGNOSIS — I10 ESSENTIAL HYPERTENSION: Primary | ICD-10-CM

## 2024-01-16 DIAGNOSIS — Z79.4 TYPE 2 DIABETES MELLITUS WITH OTHER DIABETIC KIDNEY COMPLICATION, WITH LONG-TERM CURRENT USE OF INSULIN (HCC): ICD-10-CM

## 2024-01-16 DIAGNOSIS — I10 ESSENTIAL HYPERTENSION: ICD-10-CM

## 2024-01-16 DIAGNOSIS — E11.29 TYPE 2 DIABETES MELLITUS WITH OTHER DIABETIC KIDNEY COMPLICATION, WITH LONG-TERM CURRENT USE OF INSULIN (HCC): Primary | ICD-10-CM

## 2024-01-16 PROCEDURE — 99214 OFFICE O/P EST MOD 30 MIN: CPT

## 2024-01-16 RX ORDER — BLOOD SUGAR DIAGNOSTIC
STRIP MISCELLANEOUS 4 TIMES DAILY
Qty: 100 EACH | Refills: 3 | Status: SHIPPED | OUTPATIENT
Start: 2024-01-16

## 2024-01-16 RX ORDER — LISINOPRIL 20 MG/1
20 TABLET ORAL DAILY
Qty: 90 TABLET | Refills: 0 | Status: CANCELLED | OUTPATIENT
Start: 2024-01-16

## 2024-01-16 RX ORDER — ATORVASTATIN CALCIUM 10 MG/1
10 TABLET, FILM COATED ORAL DAILY
Qty: 90 TABLET | Refills: 3 | Status: SHIPPED | OUTPATIENT
Start: 2024-01-16

## 2024-01-16 RX ORDER — LANCETS 33 GAUGE
EACH MISCELLANEOUS
Qty: 400 EACH | Refills: 3 | Status: SHIPPED | OUTPATIENT
Start: 2024-01-16

## 2024-01-16 RX ORDER — ATORVASTATIN CALCIUM 10 MG/1
10 TABLET, FILM COATED ORAL DAILY
Qty: 90 TABLET | Refills: 0 | Status: CANCELLED | OUTPATIENT
Start: 2024-01-16

## 2024-01-16 RX ORDER — INSULIN DEGLUDEC INJECTION 100 U/ML
100 INJECTION, SOLUTION SUBCUTANEOUS DAILY
Qty: 60 ML | Refills: 3 | Status: SHIPPED | OUTPATIENT
Start: 2024-01-16

## 2024-01-16 RX ORDER — CLOPIDOGREL BISULFATE 75 MG/1
75 TABLET ORAL DAILY
Qty: 90 TABLET | Refills: 3 | Status: SHIPPED | OUTPATIENT
Start: 2024-01-16 | End: 2025-01-10

## 2024-01-16 RX ORDER — BLOOD-GLUCOSE METER
KIT MISCELLANEOUS
Qty: 1 KIT | Refills: 0 | Status: SHIPPED | OUTPATIENT
Start: 2024-01-16

## 2024-01-16 RX ORDER — BLOOD SUGAR DIAGNOSTIC
STRIP MISCELLANEOUS
Qty: 400 EACH | Refills: 3 | Status: SHIPPED | OUTPATIENT
Start: 2024-01-16

## 2024-01-16 RX ORDER — LISINOPRIL 20 MG/1
20 TABLET ORAL DAILY
Qty: 90 TABLET | Refills: 3 | Status: SHIPPED | OUTPATIENT
Start: 2024-01-16

## 2024-01-16 RX ORDER — INSULIN DEGLUDEC INJECTION 100 U/ML
100 INJECTION, SOLUTION SUBCUTANEOUS DAILY
Qty: 15 ML | Refills: 3 | Status: SHIPPED | OUTPATIENT
Start: 2024-01-16 | End: 2024-01-16 | Stop reason: SDUPTHER

## 2024-01-16 NOTE — TELEPHONE ENCOUNTER
Pt called and states that his rx for Tresiba was sent in for a 15 day supply. Pt is requesting a 60 day supply.

## 2024-01-16 NOTE — TELEPHONE ENCOUNTER
I called and left message on patients answering machine for patient to give the office a call back about scheduling a Nephrology Consult ref by Dr. Kelly Alicea for Stage 3 chronic kidney disease, unspecified whether stage 3a or 3b CKD (HCC

## 2024-01-16 NOTE — ASSESSMENT & PLAN NOTE
Reviewed instructions to stay on Tresiba same dose, will add sliding scale. Went over in get detail. Follow up is scheduled in 1 month. Have fasting lab work prior to follow up  Lab Results   Component Value Date    HGBA1C 10.9 (A) 01/15/2024

## 2024-01-16 NOTE — PROGRESS NOTES
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 Went over in get detail. Follow up is scheduled in 1 month. Have fasting lab work prior to follow up  Lab Results   Component Value Date    HGBA1C 10.9 (A) 01/15/2024          Relevant Medications    insulin degludec (Tresiba FlexTouch) 100 units/mL injection pen    Insulin Pen Needle (Advocate Insulin Pen Needles) 31G X 8 MM MISC       Cardiovascular and Mediastinum    Essential hypertension     Continue current medication, have lab work, follow up in 1 month.          Relevant Medications    lisinopril (ZESTRIL) 20 mg tablet    Other Relevant Orders    CBC and differential    Comprehensive metabolic panel    Lipid panel    TSH, 3rd generation with Free T4 reflex    Albumin / creatinine urine ratio    Coronary artery disease     Continue current medication, have lab work, follow up in 1 month.          Relevant Medications    atorvastatin (LIPITOR) 10 mg tablet    clopidogrel (PLAVIX) 75 mg tablet    Other Relevant Orders    CBC and differential    Comprehensive metabolic panel    Lipid panel    TSH, 3rd generation with Free T4 reflex    Albumin / creatinine urine ratio   Other Visit Diagnoses     Vitamin D deficiency    -  Primary    Have lab work, prior to next visit.    Relevant Orders    Vitamin D 25 hydroxy            Subjective:      Patient ID: Blair Caldwell is a 63 y.o. male.    Blair is here to go over all his instructions from yesterday regarding insulin.         The following portions of the patient's history were reviewed and updated as appropriate:   Past Medical History:  He has a past medical history of Diabetes mellitus (HCC), Enlarged prostate, Hypertension, Kidney stones, and Myocardial infarction  (HCC).,  _______________________________________________________________________  Medical Problems:  does not have any pertinent problems on file.,  _______________________________________________________________________  Past Surgical History:   has a past surgical history that includes Hand surgery and Tonsillectomy.,  _______________________________________________________________________  Family History:  family history includes Lung cancer in his father.,  _______________________________________________________________________  Social History:   reports that he has never smoked. He has never used smokeless tobacco. He reports current alcohol use. He reports that he does not use drugs.,  _______________________________________________________________________  Allergies:  is allergic to cat hair extract..  _______________________________________________________________________  Current Outpatient Medications   Medication Sig Dispense Refill   • atorvastatin (LIPITOR) 10 mg tablet Take 1 tablet (10 mg total) by mouth daily 90 tablet 3   • clopidogrel (PLAVIX) 75 mg tablet Take 1 tablet (75 mg total) by mouth daily 90 tablet 3   • insulin aspart (NovoLOG FlexPen) 100 UNIT/ML injection pen Sliding Scale: 0-149=0 units, 150-199=2 units, 200-249=4 units, 250-299=6 units, 300-349=8 units, 350-399=10 units, >400=Call office 8.1 mL 1   • insulin degludec (Tresiba FlexTouch) 100 units/mL injection pen Inject 100 Units under the skin daily 15 mL 3   • Insulin Pen Needle (Advocate Insulin Pen Needles) 31G X 8 MM MISC Use 4 (four) times a day 100 each 3   • lisinopril (ZESTRIL) 20 mg tablet Take 1 tablet (20 mg total) by mouth daily 90 tablet 3   • nitroglycerin (NITROSTAT) 0.4 mg SL tablet Place 1 tablet (0.4 mg total) under the tongue every 5 (five) minutes as needed for chest pain for up to 10 days 10 tablet 0     No current facility-administered medications for this visit.  "    _______________________________________________________________________  Review of Systems   Constitutional:  Negative for chills, diaphoresis and fever.   HENT:  Negative for congestion, ear pain, postnasal drip, rhinorrhea, sinus pressure, sinus pain and sore throat.    Eyes:  Negative for pain and visual disturbance.   Respiratory:  Negative for cough, chest tightness, shortness of breath and wheezing.    Cardiovascular:  Negative for chest pain and palpitations.   Gastrointestinal:  Negative for abdominal pain, constipation, diarrhea, nausea and vomiting.   Genitourinary:  Negative for dysuria, frequency, hematuria and urgency.   Musculoskeletal:  Positive for arthralgias. Negative for back pain and myalgias.   Skin:  Negative for color change and rash.   Neurological:  Negative for dizziness, seizures, syncope, light-headedness and headaches.   All other systems reviewed and are negative.        Objective:  Vitals:    01/16/24 1140   Weight: 99.8 kg (220 lb)   Height: 5' 9\" (1.753 m)     Body mass index is 32.49 kg/m².     Physical Exam  Vitals and nursing note reviewed.   Constitutional:       General: He is not in acute distress.     Appearance: Normal appearance. He is not ill-appearing.   HENT:      Head: Normocephalic.      Right Ear: Tympanic membrane, ear canal and external ear normal. There is no impacted cerumen.      Left Ear: Tympanic membrane, ear canal and external ear normal. There is no impacted cerumen.      Nose: Nose normal.      Mouth/Throat:      Mouth: Mucous membranes are moist.      Pharynx: No posterior oropharyngeal erythema.   Eyes:      General:         Right eye: No discharge.         Left eye: No discharge.      Conjunctiva/sclera: Conjunctivae normal.   Cardiovascular:      Rate and Rhythm: Normal rate and regular rhythm.      Pulses: Normal pulses.      Heart sounds: Normal heart sounds. No murmur heard.  Pulmonary:      Effort: Pulmonary effort is normal. No respiratory " distress.      Breath sounds: Normal breath sounds. No wheezing.   Abdominal:      General: Abdomen is flat. Bowel sounds are normal.   Musculoskeletal:         General: Normal range of motion.      Cervical back: Normal range of motion.      Right lower leg: No edema.      Left lower leg: No edema.   Skin:     General: Skin is warm and dry.   Neurological:      General: No focal deficit present.      Mental Status: He is alert and oriented to person, place, and time.   Psychiatric:         Mood and Affect: Mood normal.         Behavior: Behavior normal.

## 2024-01-16 NOTE — TELEPHONE ENCOUNTER
"Patient called. He stated that we did not send in his medication correctly. He stated that he needs a sugar meter and test strips.     He also said we did not call in a few others:  - Klonopin 75mg One Time A Day  - Lisinopril 20mg One Time A Day  - Atrdasatin 10mg One Time A Day    He also stated these should be for a 60 day supply.    He also stated that he uses 8mm needles, not 4mm.     He was given 5 pens with 100 units.    He also does not understand how much a \"unit\" is as far as his injections go. He does not know how to take the medication, he stated that it does not make sense because it was not explained to him what a \"unit\" is.     Please call patient to clarify how to use his medication.     He stated that he can come in today if needed. He is going to plow the driveway now, he asked to give him an hour or so.   Thank you!  "

## 2024-01-18 RX ORDER — BLOOD-GLUCOSE METER
EACH MISCELLANEOUS 2 TIMES DAILY
Qty: 100 KIT | Refills: 0 | Status: SHIPPED | OUTPATIENT
Start: 2024-01-18

## 2024-01-18 RX ORDER — PERPHENAZINE 16 MG/1
TABLET, FILM COATED ORAL
Qty: 100 EACH | Refills: 0 | Status: SHIPPED | OUTPATIENT
Start: 2024-01-18

## 2024-01-19 ENCOUNTER — TELEPHONE (OUTPATIENT)
Dept: NEPHROLOGY | Facility: CLINIC | Age: 64
End: 2024-01-19

## 2024-01-27 LAB — COLOGUARD RESULT REPORTABLE: NORMAL

## 2024-02-12 ENCOUNTER — HOSPITAL ENCOUNTER (EMERGENCY)
Facility: HOSPITAL | Age: 64
Discharge: HOME | End: 2024-02-12
Attending: EMERGENCY MEDICINE
Payer: COMMERCIAL

## 2024-02-12 ENCOUNTER — APPOINTMENT (EMERGENCY)
Dept: CT IMAGING | Facility: HOSPITAL | Age: 64
End: 2024-02-12
Payer: COMMERCIAL

## 2024-02-12 VITALS
BODY MASS INDEX: 32.58 KG/M2 | DIASTOLIC BLOOD PRESSURE: 96 MMHG | HEIGHT: 69 IN | RESPIRATION RATE: 16 BRPM | TEMPERATURE: 97.8 F | WEIGHT: 220 LBS | HEART RATE: 67 BPM | OXYGEN SATURATION: 99 % | SYSTOLIC BLOOD PRESSURE: 182 MMHG

## 2024-02-12 DIAGNOSIS — L03.213 PRESEPTAL CELLULITIS OF LEFT EYE: Primary | ICD-10-CM

## 2024-02-12 DIAGNOSIS — R03.0 ELEVATED BLOOD PRESSURE READING: ICD-10-CM

## 2024-02-12 LAB
ALBUMIN SERPL BCP-MCNC: 4 G/DL (ref 3.5–5)
ALP SERPL-CCNC: 65 U/L (ref 34–104)
ALT SERPL W P-5'-P-CCNC: 15 U/L (ref 7–52)
ANION GAP SERPL CALCULATED.3IONS-SCNC: 7 MMOL/L
AST SERPL W P-5'-P-CCNC: 12 U/L (ref 13–39)
BASOPHILS # BLD AUTO: 0.05 THOUSANDS/ÂΜL (ref 0–0.1)
BASOPHILS NFR BLD AUTO: 0 % (ref 0–1)
BILIRUB SERPL-MCNC: 0.58 MG/DL (ref 0.2–1)
BUN SERPL-MCNC: 24 MG/DL (ref 5–25)
CALCIUM SERPL-MCNC: 8.9 MG/DL (ref 8.4–10.2)
CHLORIDE SERPL-SCNC: 110 MMOL/L (ref 96–108)
CO2 SERPL-SCNC: 21 MMOL/L (ref 21–32)
CREAT SERPL-MCNC: 1.49 MG/DL (ref 0.6–1.3)
EOSINOPHIL # BLD AUTO: 0.2 THOUSAND/ÂΜL (ref 0–0.61)
EOSINOPHIL NFR BLD AUTO: 2 % (ref 0–6)
ERYTHROCYTE [DISTWIDTH] IN BLOOD BY AUTOMATED COUNT: 12.9 % (ref 11.6–15.1)
GFR SERPL CREATININE-BSD FRML MDRD: 48 ML/MIN/1.73SQ M
GLUCOSE SERPL-MCNC: 199 MG/DL (ref 65–140)
HCT VFR BLD AUTO: 43 % (ref 36.5–49.3)
HGB BLD-MCNC: 14.4 G/DL (ref 12–17)
IMM GRANULOCYTES # BLD AUTO: 0.07 THOUSAND/UL (ref 0–0.2)
IMM GRANULOCYTES NFR BLD AUTO: 1 % (ref 0–2)
LYMPHOCYTES # BLD AUTO: 2.97 THOUSANDS/ÂΜL (ref 0.6–4.47)
LYMPHOCYTES NFR BLD AUTO: 25 % (ref 14–44)
MCH RBC QN AUTO: 28 PG (ref 26.8–34.3)
MCHC RBC AUTO-ENTMCNC: 33.5 G/DL (ref 31.4–37.4)
MCV RBC AUTO: 84 FL (ref 82–98)
MONOCYTES # BLD AUTO: 0.85 THOUSAND/ÂΜL (ref 0.17–1.22)
MONOCYTES NFR BLD AUTO: 7 % (ref 4–12)
NEUTROPHILS # BLD AUTO: 7.78 THOUSANDS/ÂΜL (ref 1.85–7.62)
NEUTS SEG NFR BLD AUTO: 65 % (ref 43–75)
NRBC BLD AUTO-RTO: 0 /100 WBCS
PLATELET # BLD AUTO: 295 THOUSANDS/UL (ref 149–390)
PMV BLD AUTO: 8.9 FL (ref 8.9–12.7)
POTASSIUM SERPL-SCNC: 4.1 MMOL/L (ref 3.5–5.3)
PROT SERPL-MCNC: 7 G/DL (ref 6.4–8.4)
RBC # BLD AUTO: 5.15 MILLION/UL (ref 3.88–5.62)
SODIUM SERPL-SCNC: 138 MMOL/L (ref 135–147)
WBC # BLD AUTO: 11.92 THOUSAND/UL (ref 4.31–10.16)

## 2024-02-12 PROCEDURE — 80053 COMPREHEN METABOLIC PANEL: CPT

## 2024-02-12 PROCEDURE — 71260 CT THORAX DX C+: CPT

## 2024-02-12 PROCEDURE — 99284 EMERGENCY DEPT VISIT MOD MDM: CPT

## 2024-02-12 PROCEDURE — 85025 COMPLETE CBC W/AUTO DIFF WBC: CPT

## 2024-02-12 PROCEDURE — 70487 CT MAXILLOFACIAL W/DYE: CPT

## 2024-02-12 PROCEDURE — 36415 COLL VENOUS BLD VENIPUNCTURE: CPT

## 2024-02-12 RX ORDER — AMOXICILLIN AND CLAVULANATE POTASSIUM 875; 125 MG/1; MG/1
1 TABLET, FILM COATED ORAL 2 TIMES DAILY
Qty: 14 TABLET | Refills: 0 | Status: SHIPPED | OUTPATIENT
Start: 2024-02-12 | End: 2024-02-19 | Stop reason: ALTCHOICE

## 2024-02-12 RX ORDER — AMOXICILLIN AND CLAVULANATE POTASSIUM 875; 125 MG/1; MG/1
1 TABLET, FILM COATED ORAL ONCE
Status: COMPLETED | OUTPATIENT
Start: 2024-02-12 | End: 2024-02-12

## 2024-02-12 RX ADMIN — AMOXICILLIN AND CLAVULANATE POTASSIUM 1 TABLET: 875; 125 TABLET, FILM COATED ORAL at 06:55

## 2024-02-12 RX ADMIN — IOHEXOL 100 ML: 350 INJECTION, SOLUTION INTRAVENOUS at 05:40

## 2024-02-12 NOTE — ED PROVIDER NOTES
History  Chief Complaint   Patient presents with    Facial Swelling     Pt arrived ambulatory with c/o a swollen left eye. Pt also has a swollen area on his right side chest     The patient is a 63 y.o. male with a history of diabetes, hypertension, MI, enlarged prostate, supraglottitis who presents to [facility] with a chief complaint of left eye swelling. Symptoms began few days ago and have been the same since onset.  His pain is currently rated as a 6/10 in severity and described as having without radiation. Associated symptoms include pain and swelling. Symptoms are aggravated with touching and alleviating factors include none noted. The patient denies fever, chills, night sweats, chest pain, shortness of breath, nausea, vomiting, syncope, falls, trauma, pain on eye movement, photophobia, headache, blurred vision, discharge from the eye, field cut, floaters.  He also reports a small red lump to his right pectoral that itches but does not hurt. No other reported symptoms at this time.  Patient denies allergies to any medications  Patient reports recently being seen by ENT for evaluation of possible throat mass and was diagnosed with supraglottitis. ENT recommended continued observation and if symptoms come back or worsen to do a biopsy at that time.           History provided by:  Patient   used: No        Prior to Admission Medications   Prescriptions Last Dose Informant Patient Reported? Taking?   Blood Glucose Monitoring Suppl (Contour Next One) KIT   No No   Sig: by Device route 2 (two) times a day May substitute brand based on insurance coverage. Check glucose ACHS.   Blood Glucose Monitoring Suppl (OneTouch Verio Reflect) w/Device KIT   No No   Sig: Check blood sugars four times daily. Please substitute with appropriate alternative as covered by patient's insurance. Dx: E11.65   Insulin Pen Needle (Advocate Insulin Pen Needles) 31G X 8 MM MISC   No No   Sig: Use 4  (four) times a day   OneTouch Delica Lancets 33G MISC   No No   Sig: Check blood sugars four times daily. Please substitute with appropriate alternative as covered by patient's insurance. Dx: E11.65   atorvastatin (LIPITOR) 10 mg tablet   No No   Sig: Take 1 tablet (10 mg total) by mouth daily   clopidogrel (PLAVIX) 75 mg tablet   No No   Sig: Take 1 tablet (75 mg total) by mouth daily   glucose blood (Contour Next Test) test strip   No No   Sig: Check sugar twice daily   glucose blood (OneTouch Verio) test strip   No No   Sig: Check blood sugars four times daily. Please substitute with appropriate alternative as covered by patient's insurance. Dx: E11.65   insulin aspart (NovoLOG FlexPen) 100 UNIT/ML injection pen   No No   Sig: Sliding Scale: 0-149=0 units, 150-199=2 units, 200-249=4 units, 250-299=6 units, 300-349=8 units, 350-399=10 units, >400=Call office   insulin degludec (Tresiba FlexTouch) 100 units/mL injection pen   No No   Sig: Inject 100 Units under the skin daily   lisinopril (ZESTRIL) 20 mg tablet   No No   Sig: Take 1 tablet (20 mg total) by mouth daily   nitroglycerin (NITROSTAT) 0.4 mg SL tablet   No No   Sig: Place 1 tablet (0.4 mg total) under the tongue every 5 (five) minutes as needed for chest pain for up to 10 days      Facility-Administered Medications: None       Past Medical History:   Diagnosis Date    Diabetes mellitus (HCC)     Enlarged prostate     Hypertension     Kidney stones     Myocardial infarction (HCC)        Past Surgical History:   Procedure Laterality Date    HAND SURGERY      TONSILLECTOMY         Family History   Problem Relation Age of Onset    Lung cancer Father      I have reviewed and agree with the history as documented.    E-Cigarette/Vaping    E-Cigarette Use Never User      E-Cigarette/Vaping Substances     Social History     Tobacco Use    Smoking status: Never    Smokeless tobacco: Never   Vaping Use    Vaping status: Never Used   Substance Use Topics    Alcohol  use: Yes    Drug use: Never       Review of Systems   Constitutional:  Negative for chills and fever.   HENT:  Negative for congestion, ear pain, mouth sores, nosebleeds, postnasal drip, rhinorrhea, sinus pressure, sinus pain and sore throat.    Eyes:  Negative for photophobia, pain, redness and visual disturbance.        Periorbital swelling and pain   Respiratory:  Negative for cough and shortness of breath.    Cardiovascular:  Negative for chest pain and palpitations.   Gastrointestinal:  Negative for abdominal pain and vomiting.   Genitourinary:  Negative for dysuria and hematuria.   Musculoskeletal:  Negative for arthralgias and back pain.   Skin:  Negative for color change and rash.   Neurological:  Negative for dizziness, seizures, syncope, speech difficulty, weakness, light-headedness, numbness and headaches.   All other systems reviewed and are negative.      Physical Exam  Physical Exam  Vitals reviewed.   Constitutional:       General: He is not in acute distress.     Appearance: Normal appearance. He is not ill-appearing.   HENT:      Head: Normocephalic and atraumatic.      Right Ear: Tympanic membrane normal.      Left Ear: Tympanic membrane normal.      Mouth/Throat:      Mouth: Mucous membranes are moist.      Pharynx: Oropharynx is clear.   Eyes:      General: Vision grossly intact. Gaze aligned appropriately. No allergic shiner, visual field deficit or scleral icterus.        Right eye: No discharge.         Left eye: No discharge.      Extraocular Movements: Extraocular movements intact.        Comments: Swelling, erythema and tenderness around the    Cardiovascular:      Rate and Rhythm: Normal rate.      Pulses: Normal pulses.   Pulmonary:      Effort: Pulmonary effort is normal. No respiratory distress.      Breath sounds: No stridor. No wheezing or rhonchi.   Chest:      Chest wall: No swelling, crepitus or edema.   Breasts:     Right: Skin change present. No swelling, bleeding, nipple  discharge or tenderness.       Abdominal:      General: Abdomen is flat.      Tenderness: There is no abdominal tenderness.   Musculoskeletal:         General: Normal range of motion.      Cervical back: Normal range of motion.   Skin:     General: Skin is warm and dry.      Capillary Refill: Capillary refill takes less than 2 seconds.      Coloration: Skin is not jaundiced.      Findings: No bruising.   Neurological:      General: No focal deficit present.      Mental Status: He is alert and oriented to person, place, and time. Mental status is at baseline.         Vital Signs  ED Triage Vitals [02/12/24 0412]   Temperature Pulse Respirations Blood Pressure SpO2   97.8 °F (36.6 °C) 67 16 (!) 182/96 99 %      Temp Source Heart Rate Source Patient Position - Orthostatic VS BP Location FiO2 (%)   Temporal Monitor -- -- --      Pain Score       --           Vitals:    02/12/24 0412   BP: (!) 182/96   Pulse: 67         Visual Acuity      ED Medications  Medications   iohexol (OMNIPAQUE) 350 MG/ML injection (MULTI-DOSE) 100 mL (100 mL Intravenous Given 2/12/24 0540)   amoxicillin-clavulanate (AUGMENTIN) 875-125 mg per tablet 1 tablet (1 tablet Oral Given 2/12/24 0655)       Diagnostic Studies  Results Reviewed       Procedure Component Value Units Date/Time    Comprehensive metabolic panel [[id number]]  (Abnormal) Collected: 02/12/24 0445    Lab Status: Final result Specimen: Blood from Arm, Left Updated: 02/12/24 0506     Sodium 138 mmol/L      Potassium 4.1 mmol/L      Chloride 110 mmol/L      CO2 21 mmol/L      ANION GAP 7 mmol/L      BUN 24 mg/dL      Creatinine 1.49 mg/dL      Glucose 199 mg/dL      Calcium 8.9 mg/dL      AST 12 U/L      ALT 15 U/L      Alkaline Phosphatase 65 U/L      Total Protein 7.0 g/dL      Albumin 4.0 g/dL      Total Bilirubin 0.58 mg/dL      eGFR 48 ml/min/1.73sq m     Narrative:      National Kidney Disease Foundation guidelines for Chronic Kidney Disease (CKD):     Stage 1 with normal or  high GFR (GFR > 90 mL/min/1.73 square meters)    Stage 2 Mild CKD (GFR = 60-89 mL/min/1.73 square meters)    Stage 3A Moderate CKD (GFR = 45-59 mL/min/1.73 square meters)    Stage 3B Moderate CKD (GFR = 30-44 mL/min/1.73 square meters)    Stage 4 Severe CKD (GFR = 15-29 mL/min/1.73 square meters)    Stage 5 End Stage CKD (GFR <15 mL/min/1.73 square meters)  Note: GFR calculation is accurate only with a steady state creatinine    CBC and differential [[id number]]  (Abnormal) Collected: 02/12/24 0445    Lab Status: Final result Specimen: Blood from Arm, Left Updated: 02/12/24 0451     WBC 11.92 Thousand/uL      RBC 5.15 Million/uL      Hemoglobin 14.4 g/dL      Hematocrit 43.0 %      MCV 84 fL      MCH 28.0 pg      MCHC 33.5 g/dL      RDW 12.9 %      MPV 8.9 fL      Platelets 295 Thousands/uL      nRBC 0 /100 WBCs      Neutrophils Relative 65 %      Immat GRANS % 1 %      Lymphocytes Relative 25 %      Monocytes Relative 7 %      Eosinophils Relative 2 %      Basophils Relative 0 %      Neutrophils Absolute 7.78 Thousands/µL      Immature Grans Absolute 0.07 Thousand/uL      Lymphocytes Absolute 2.97 Thousands/µL      Monocytes Absolute 0.85 Thousand/µL      Eosinophils Absolute 0.20 Thousand/µL      Basophils Absolute 0.05 Thousands/µL                    CT facial bones with contrast   Final Result by David Allen MD (02/12 0614)      Left periorbital/preseptal cellulitis with more focal confluent edema adjacent to the anterolateral orbital rim. No peripherally enhancing fluid collection to suggest abscess.      No evidence of orbital cellulitis.      This study demonstrates a significant  finding and was documented as such in [facility] for liaison and referring practitioner notification.      Workstation performed: [id number]         CT chest with contrast   Final Result by Neil Hernandez MD (02/12 0645)      No acute findings. No chest wall abnormalities appreciated.               Workstation  performed: [id number]                    Procedures  Procedures         ED Course  ED Course as of 02/12/24 0711   Mon Feb 12, 2024   0617 CT facial bones with contrast  Left periorbital/preseptal cellulitis with more focal confluent edema adjacent to the anterolateral orbital rim. No peripherally enhancing fluid collection to suggest abscess.     No evidence of orbital cellulitis.   0649 CT chest with contrast  No acute findings. No chest wall abnormalities appreciated.                               SBIRT 22yo+      Flowsheet Row Most Recent Value   Initial Alcohol Screen: US AUDIT-C     1. How often do you have a drink containing alcohol? 0 Filed at: 02/12/2024 0414   2. How many drinks containing alcohol do you have on a typical day you are drinking?  0 Filed at: 02/12/2024 0414   3a. Male UNDER 65: How often do you have five or more drinks on one occasion? 0 Filed at: 02/12/2024 0414   Audit-C Score 0 Filed at: 02/12/2024 0414   TRACE: How many times in the past year have you...    Used an illegal drug or used a prescription medication for non-medical reasons? Never Filed at: 02/12/2024 0414                      Medical Decision Making  The patient is a 63 y.o. male with a history of diabetes, hypertension, MI, enlarged prostate, supraglottitis who presents to [facility] with a chief complaint of left eye swelling.   On exam he has no blurred vision, decreased visual acuity, injection, discharge, pain with EOMs.  CT facial bones Left periorbital/preseptal cellulitis with more focal confluent edema adjacent to the anterolateral orbital rim. No peripherally enhancing fluid collection to suggest abscess. No evidence of orbital cellulitis.   CT chest showed No acute findings. No chest wall abnormalities appreciated.  Discussed results with the patient.  Patient will be given Augmentin for preseptal cellulitis and will follow-up within 48 hours with his primary care physician.  If he cannot follow-up  he will return to the ER.  Patient understands treatment plan and follow-up given strict return parameters.    Problems Addressed:  Elevated blood pressure reading: acute illness or injury  Preseptal cellulitis of left eye: acute illness or injury    Amount and/or Complexity of Data Reviewed  Labs: ordered.  Radiology: ordered. Decision-making details documented in ED Course.    Risk  Prescription drug management.             Disposition  Final diagnoses:   Preseptal cellulitis of left eye   Elevated blood pressure reading     Time reflects when diagnosis was documented in both MDM as applicable and the Disposition within this note       Time User Action Codes Description Comment    2/12/2024  6:51 AM Inder Gibson Add [L03.213] Preseptal cellulitis of left eye     2/12/2024  6:53 AM Inder Gibson Add [R03.0] Elevated blood pressure reading           ED Disposition       ED Disposition   Discharge    Condition   Stable    Date/Time   Mon Feb 12, 2024 0652    Comment   Blair Caldwell discharge to home/self care.                   Follow-up Information       Follow up With Specialties Details Why Contact Info Additional Information    [facility], Nurse Practitioner Schedule an appointment as soon as possible for a visit   [address]  [facility] [address]  [phone]       [facility] Emergency Medicine  If symptoms worsen [address] [facility] [id number] [phone] [facility], [address] [place], [place], [address]            Patient's Medications   Discharge Prescriptions    AMOXICILLIN-CLAVULANATE (AUGMENTIN) 875-125 MG PER TABLET    Take 1 tablet by mouth 2 (two) times a day for 7 days       Start Date: 2/12/2024 End Date: 2/19/2024       Order Dose: 1 tablet       Quantity: 14 tablet    Refills: 0       No discharge procedures on file.    PDMP Review          Value Time User    PDMP Reviewed  Yes 1/5/2024  6:49 AM Sallie Ibarra PA-C            ED Provider  Electronically Signed by             Inder Gibson PA-C  02/12/24 0711

## 2024-02-12 NOTE — DISCHARGE INSTRUCTIONS
Follow-up with PCP within 48 hours for reevaluation. Use warm compresses. Talk to PCP about your blood pressure. Take medicine as directed.  If any symptoms worsening symptoms appear return to the ER.

## 2024-02-14 ENCOUNTER — HOSPITAL ENCOUNTER (EMERGENCY)
Facility: HOSPITAL | Age: 64
Discharge: HOME | End: 2024-02-14
Payer: COMMERCIAL

## 2024-02-14 VITALS
RESPIRATION RATE: 20 BRPM | OXYGEN SATURATION: 99 % | HEART RATE: 77 BPM | SYSTOLIC BLOOD PRESSURE: 139 MMHG | DIASTOLIC BLOOD PRESSURE: 83 MMHG | TEMPERATURE: 98.6 F

## 2024-02-14 DIAGNOSIS — L02.01 FACIAL ABSCESS: Primary | ICD-10-CM

## 2024-02-14 PROCEDURE — 99282 EMERGENCY DEPT VISIT SF MDM: CPT

## 2024-02-14 PROCEDURE — 10061 I&D ABSCESS COMP/MULTIPLE: CPT | Performed by: PHYSICIAN ASSISTANT

## 2024-02-14 PROCEDURE — 99284 EMERGENCY DEPT VISIT MOD MDM: CPT | Performed by: PHYSICIAN ASSISTANT

## 2024-02-14 RX ORDER — DOXYCYCLINE HYCLATE 100 MG/1
100 CAPSULE ORAL 2 TIMES DAILY
Qty: 20 CAPSULE | Refills: 0 | Status: SHIPPED | OUTPATIENT
Start: 2024-02-14 | End: 2024-02-24

## 2024-02-14 NOTE — ED PROVIDER NOTES
History  Chief Complaint   Patient presents with    Eye Pain    Cellulitis     Pt seen here 2 days ago for same, placed on Abx, little improvement.      63 y.o. male with a history of diabetes, hypertension, MI, re-presents to Emergency Department with a chief complaint of left eye swelling. Patient seen in ED on 2/12/24 for similar symptoms.  He reports initial symptoms started a few days prior to 2/12 ED evaluation.   Quality is reported as moderately painful left eyebrow swelling.   Associated symptoms: denies fevers, eye pain, loss of vision, headache, chills, nausea, vomiting,  pain on eye movement, photophobia, discharge from the eye or visual field deficits.  Modifyers: treated with augmentin on 2/12 without improvement.  Returns today stating swelling is worse and has not come to a head and be believes he needs stronger antibiotics.           History provided by:  Patient   used: No    Eye Pain  Associated symptoms: no abdominal pain, no chest pain, no congestion, no cough, no ear pain, no fever, no headaches, no rash, no rhinorrhea, no shortness of breath, no sore throat and no vomiting        Prior to Admission Medications   Prescriptions Last Dose Informant Patient Reported? Taking?   Blood Glucose Monitoring Suppl (Contour Next One) KIT   No No   Sig: by Device route 2 (two) times a day May substitute brand based on insurance coverage. Check glucose ACHS.   Blood Glucose Monitoring Suppl (OneTouch Verio Reflect) w/Device KIT   No No   Sig: Check blood sugars four times daily. Please substitute with appropriate alternative as covered by patient's insurance. Dx: E11.65   Insulin Pen Needle (Advocate Insulin Pen Needles) 31G X 8 MM MISC   No No   Sig: Use 4 (four) times a day   OneTouch Delica Lancets 33G MISC   No No   Sig: Check blood sugars four times daily. Please substitute with appropriate alternative as covered by patient's insurance. Dx: E11.65   amoxicillin-clavulanate  (AUGMENTIN) 875-125 mg per tablet   No No   Sig: Take 1 tablet by mouth 2 (two) times a day for 7 days   atorvastatin (LIPITOR) 10 mg tablet   No No   Sig: Take 1 tablet (10 mg total) by mouth daily   clopidogrel (PLAVIX) 75 mg tablet   No No   Sig: Take 1 tablet (75 mg total) by mouth daily   glucose blood (Contour Next Test) test strip   No No   Sig: Check sugar twice daily   glucose blood (OneTouch Verio) test strip   No No   Sig: Check blood sugars four times daily. Please substitute with appropriate alternative as covered by patient's insurance. Dx: E11.65   insulin aspart (NovoLOG FlexPen) 100 UNIT/ML injection pen   No No   Sig: Sliding Scale: 0-149=0 units, 150-199=2 units, 200-249=4 units, 250-299=6 units, 300-349=8 units, 350-399=10 units, >400=Call office   insulin degludec (Tresiba FlexTouch) 100 units/mL injection pen   No No   Sig: Inject 100 Units under the skin daily   lisinopril (ZESTRIL) 20 mg tablet   No No   Sig: Take 1 tablet (20 mg total) by mouth daily   nitroglycerin (NITROSTAT) 0.4 mg SL tablet   No No   Sig: Place 1 tablet (0.4 mg total) under the tongue every 5 (five) minutes as needed for chest pain for up to 10 days      Facility-Administered Medications: None       Past Medical History:   Diagnosis Date    Diabetes mellitus (HCC)     Enlarged prostate     Hypertension     Kidney stones     Myocardial infarction (HCC)        Past Surgical History:   Procedure Laterality Date    HAND SURGERY      TONSILLECTOMY         Family History   Problem Relation Age of Onset    Lung cancer Father      I have reviewed and agree with the history as documented.    E-Cigarette/Vaping    E-Cigarette Use Never User      E-Cigarette/Vaping Substances     Social History     Tobacco Use    Smoking status: Never    Smokeless tobacco: Never   Vaping Use    Vaping status: Never Used   Substance Use Topics    Alcohol use: Yes    Drug use: Never       Review of Systems   Constitutional:  Negative for chills and  fever.   HENT:  Negative for congestion, ear pain, mouth sores, nosebleeds, postnasal drip, rhinorrhea, sinus pressure, sinus pain and sore throat.    Eyes:  Positive for pain. Negative for photophobia, redness and visual disturbance.        Periorbital swelling and pain   Respiratory:  Negative for cough and shortness of breath.    Cardiovascular:  Negative for chest pain and palpitations.   Gastrointestinal:  Negative for abdominal pain and vomiting.   Genitourinary:  Negative for dysuria and hematuria.   Musculoskeletal:  Negative for arthralgias and back pain.   Skin:  Negative for color change and rash.   Neurological:  Negative for dizziness, seizures, syncope, speech difficulty, weakness, light-headedness, numbness and headaches.   All other systems reviewed and are negative.      Physical Exam  Physical Exam  Vitals reviewed.   Constitutional:       General: He is not in acute distress.     Appearance: Normal appearance. He is not ill-appearing.   HENT:      Head: Normocephalic and atraumatic.      Right Ear: Tympanic membrane normal.      Left Ear: Tympanic membrane normal.      Mouth/Throat:      Mouth: Mucous membranes are moist.      Pharynx: Oropharynx is clear.   Eyes:      General: Vision grossly intact. Gaze aligned appropriately. No allergic shiner, visual field deficit or scleral icterus.        Right eye: No discharge.         Left eye: No discharge.      Extraocular Movements: Extraocular movements intact.        Comments: Swelling, erythema and tenderness around the    Cardiovascular:      Rate and Rhythm: Normal rate.      Pulses: Normal pulses.   Pulmonary:      Effort: Pulmonary effort is normal. No respiratory distress.      Breath sounds: No stridor. No wheezing or rhonchi.   Chest:      Chest wall: No swelling, crepitus or edema.   Breasts:     Right: Skin change present. No swelling, bleeding, nipple discharge or tenderness.       Abdominal:      General: Abdomen is flat.      Tenderness:  There is no abdominal tenderness.   Musculoskeletal:         General: Normal range of motion.      Cervical back: Normal range of motion.   Skin:     General: Skin is warm and dry.      Capillary Refill: Capillary refill takes less than 2 seconds.      Coloration: Skin is not jaundiced.      Findings: No bruising.   Neurological:      General: No focal deficit present.      Mental Status: He is alert and oriented to person, place, and time. Mental status is at baseline.         Vital Signs  ED Triage Vitals [02/14/24 0910]   Temperature Pulse Respirations Blood Pressure SpO2   98.6 °F (37 °C) 77 20 139/83 99 %      Temp Source Heart Rate Source Patient Position - Orthostatic VS BP Location FiO2 (%)   Temporal Monitor Sitting Left arm --      Pain Score       --           Vitals:    02/14/24 0910   BP: 139/83   Pulse: 77   Patient Position - Orthostatic VS: Sitting         Visual Acuity      ED Medications  Medications - No data to display    Diagnostic Studies  Results Reviewed       None                   No orders to display              Procedures  Incision and drain    Date/Time: 2/14/2024 11:15 AM    Performed by: Efrain Antonio PA-C  Authorized by: Efrain Antonio PA-C  Universal Protocol:  Consent: Verbal consent obtained.  Risks and benefits: risks, benefits and alternatives were discussed  Consent given by: patient    Patient location:  ED  Location:     Type:  Abscess    Location:  Head/neck    Head/neck location:  L eyelid  Pre-procedure details:     Skin preparation:  Betadine  Anesthesia (see MAR for exact dosages):     Anesthesia method:  Local infiltration    Local anesthetic:  Lidocaine 1% WITH epi  Procedure details:     Complexity:  Complex    Incision types:  Stab incision    Aspiration type: puncture aspiration      Scalpel blade:  11    Approach:  Puncture    Incision depth:  Subcutaneous    Wound management:  Probed and deloculated and extensive cleaning    Hemostat:  Yes     Drainage:  Purulent    Drainage amount:  Moderate    Wound treatment:  Wound left open    Packing materials:  None  Post-procedure details:     Patient tolerance of procedure:  Tolerated well, no immediate complications    Complication (if applicable):  None           ED Course  ED Course as of 02/14/24 1825   Wed Feb 14, 2024   1031 Reviewed ED chart from 2/12/24.  Patient evaluated for periorbital swelling.  Ct facial bones demonstrated Left periorbital/preseptal cellulitis with more focal confluent edema adjacent to the anterolateral orbital rim. No peripherally enhancing fluid collection to suggest abscess. Discharged on Augmentin                               SBIRT 20yo+      Flowsheet Row Most Recent Value   Initial Alcohol Screen: US AUDIT-C     1. How often do you have a drink containing alcohol? 0 Filed at: 02/14/2024 1111   2. How many drinks containing alcohol do you have on a typical day you are drinking?  0 Filed at: 02/14/2024 1111   3a. Male UNDER 65: How often do you have five or more drinks on one occasion? 0 Filed at: 02/14/2024 1111   3b. FEMALE Any Age, or MALE 65+: How often do you have 4 or more drinks on one occassion? 0 Filed at: 02/14/2024 1111   Audit-C Score 0 Filed at: 02/14/2024 1111   TRACE: How many times in the past year have you...    Used an illegal drug or used a prescription medication for non-medical reasons? Never Filed at: 02/14/2024 1111                      Medical Decision Making  MDM:     Based on my history and physical examination, I considered the following life or limb threatening disease(s) in my Differential Diagnosis:  preseptal or orbital cellulitis, NSTI, abscess, cellulitis, retrobulbar hematoma    Patient has been medically screened for potential limb- or life-threatening conditions that may lead to permanent organ injury or dysfunctionan. Initial history and clinical exam findings raise concern for an acute emergent process.  Considered indications for advanced  imaging but patient had ct face for same symptoms 2 days ago and repeat imaging unlikely to .      Based on my history and clinical evaluation I will order the appropriate diagnostic testing to narrow my differential diagnosis and arrive at a final diagnosis. I have reviewed the patient's vital signs, nursing notes, and other relevant ancillary testing/information. I have had a detailed discussion with the patient regarding the historical points, examination findings, and any diagnostic results    Final Assessment (see ED course for additional MDM):  Left eyebrow abscess/facial abscess not improving after 2 days of Augmentin.  Consider MRSA abscess.   No ptosis or proptosis on exam. Mastoids without redness, warmth or pain.  Well localized fluctuant pustule amenable to local I&D performed in ED.  Hemodynamically stable, will switch antibiotic coverage to doxycycline to cover MRSA.  Stable for discharge and outpatient follow-up.  Follow-up with primary care physician for further treatment.  Return to ed precautions given.       Risk  Prescription drug management.             Disposition  Final diagnoses:   Facial abscess     Time reflects when diagnosis was documented in both MDM as applicable and the Disposition within this note       Time User Action Codes Description Comment    2/14/2024 11:23 AM Efrain Antonio Add [L02.01] Facial abscess           ED Disposition       ED Disposition   Discharge    Condition   Stable    Date/Time   Wed Feb 14, 2024 1123    Comment   Blair Caldwell discharge to home/self care.                   Follow-up Information       Follow up With Specialties Details Why Contact Info Additional Information    [facility], Nurse Practitioner Call in 2 days for further evaluation of symptoms [address] [address]  [phone]       [facility] Emergency Department Emergency Medicine Go to  If symptoms worsen  [address] [facility] [address]  [phone] [facility] Emergency Department, [address], [place], [place], [address]            Discharge Medication List as of 2/14/2024 11:29 AM        START taking these medications    Details   doxycycline hyclate (VIBRAMYCIN) 100 mg capsule Take 1 capsule (100 mg total) by mouth 2 (two) times a day for 10 days, Starting Wed 2/14/2024, Until Sat 2/24/2024, Normal           CONTINUE these medications which have NOT CHANGED    Details   amoxicillin-clavulanate (AUGMENTIN) 875-125 mg per tablet Take 1 tablet by mouth 2 (two) times a day for 7 days, Starting Mon 2/12/2024, Until Mon 2/19/2024, Normal      atorvastatin (LIPITOR) 10 mg tablet Take 1 tablet (10 mg total) by mouth daily, Starting Tue 1/16/2024, Normal      Blood Glucose Monitoring Suppl (Contour Next One) KIT by Device route 2 (two) times a day May substitute brand based on insurance coverage. Check glucose ACHS., Starting Thu 1/18/2024, Normal      Blood Glucose Monitoring Suppl (OneTouch Verio Reflect) w/Device KIT Check blood sugars four times daily. Please substitute with appropriate alternative as covered by patient's insurance. Dx: E11.65, Normal      clopidogrel (PLAVIX) 75 mg tablet Take 1 tablet (75 mg total) by mouth daily, Starting Tue 1/16/2024, Until Fri 1/10/2025, Normal      !! glucose blood (Contour Next Test) test strip Check sugar twice daily, Normal      !! glucose blood (OneTouch Verio) test strip Check blood sugars four times daily. Please substitute with appropriate alternative as covered by patient's insurance. Dx: E11.65, Normal      insulin aspart (NovoLOG FlexPen) 100 UNIT/ML injection pen Sliding Scale: 0-149=0 units, 150-199=2 units, 200-249=4 units, 250-299=6 units, 300-349=8 units, 350-399=10 units, >400=Call office, Normal      insulin degludec (Tresiba FlexTouch) 100 units/mL injection pen Inject 100 Units under the skin daily,  Starting Tue 1/16/2024, Normal      Insulin Pen Needle (Advocate Insulin Pen Needles) 31G X 8 MM MISC Use 4 (four) times a day, Starting Tue 1/16/2024, Normal      lisinopril (ZESTRIL) 20 mg tablet Take 1 tablet (20 mg total) by mouth daily, Starting Tue 1/16/2024, Normal      nitroglycerin (NITROSTAT) 0.4 mg SL tablet Place 1 tablet (0.4 mg total) under the tongue every 5 (five) minutes as needed for chest pain for up to 10 days, Starting Mon 8/23/2021, Until Thu 9/2/2021 at 2359, Normal      OneTouch Delica Lancets 33G MISC Check blood sugars four times daily. Please substitute with appropriate alternative as covered by patient's insurance. Dx: E11.65, Normal       !! - Potential duplicate medications found. Please discuss with provider.          No discharge procedures on file.    PDMP Review         Value Time User    PDMP Reviewed  Yes 1/5/2024  6:49 AM Sallie Ibarra PA-C            ED Provider  Electronically Signed by             Efrain Antonio PA-C  02/14/24 [id number]

## 2024-02-19 ENCOUNTER — OFFICE VISIT (OUTPATIENT)
Dept: FAMILY MEDICINE CLINIC | Facility: CLINIC | Age: 64
End: 2024-02-19
Payer: COMMERCIAL

## 2024-02-19 VITALS
DIASTOLIC BLOOD PRESSURE: 84 MMHG | OXYGEN SATURATION: 100 % | HEIGHT: 69 IN | BODY MASS INDEX: 34.07 KG/M2 | WEIGHT: 230 LBS | HEART RATE: 62 BPM | SYSTOLIC BLOOD PRESSURE: 158 MMHG | RESPIRATION RATE: 16 BRPM

## 2024-02-19 DIAGNOSIS — E66.09 CLASS 1 OBESITY DUE TO EXCESS CALORIES WITHOUT SERIOUS COMORBIDITY WITH BODY MASS INDEX (BMI) OF 33.0 TO 33.9 IN ADULT: ICD-10-CM

## 2024-02-19 DIAGNOSIS — L02.91 ABSCESS: ICD-10-CM

## 2024-02-19 DIAGNOSIS — I10 ESSENTIAL HYPERTENSION: ICD-10-CM

## 2024-02-19 DIAGNOSIS — N18.9 CHRONIC KIDNEY DISEASE, UNSPECIFIED CKD STAGE: ICD-10-CM

## 2024-02-19 DIAGNOSIS — E66.09 CLASS 2 OBESITY DUE TO EXCESS CALORIES WITHOUT SERIOUS COMORBIDITY WITH BODY MASS INDEX (BMI) OF 36.0 TO 36.9 IN ADULT: Primary | ICD-10-CM

## 2024-02-19 DIAGNOSIS — Z79.4 TYPE 2 DIABETES MELLITUS WITH OTHER DIABETIC KIDNEY COMPLICATION, WITH LONG-TERM CURRENT USE OF INSULIN (HCC): ICD-10-CM

## 2024-02-19 DIAGNOSIS — E11.29 TYPE 2 DIABETES MELLITUS WITH OTHER DIABETIC KIDNEY COMPLICATION, WITH LONG-TERM CURRENT USE OF INSULIN (HCC): ICD-10-CM

## 2024-02-19 LAB
GLUCOSE SERPL-MCNC: 83 MG/DL (ref 65–140)
SL AMB POCT HEMOGLOBIN AIC: 9.2 (ref ?–6.5)

## 2024-02-19 PROCEDURE — 83036 HEMOGLOBIN GLYCOSYLATED A1C: CPT | Performed by: NURSE PRACTITIONER

## 2024-02-19 PROCEDURE — 99214 OFFICE O/P EST MOD 30 MIN: CPT | Performed by: NURSE PRACTITIONER

## 2024-02-19 RX ORDER — TAMSULOSIN HYDROCHLORIDE 0.4 MG/1
0.4 CAPSULE ORAL
COMMUNITY
Start: 2024-01-19 | End: 2025-01-18

## 2024-02-19 RX ORDER — LISINOPRIL 40 MG/1
40 TABLET ORAL DAILY
Qty: 30 TABLET | Refills: 2 | Status: SHIPPED | OUTPATIENT
Start: 2024-02-19

## 2024-02-19 RX ORDER — BLOOD-GLUCOSE METER
EACH MISCELLANEOUS 2 TIMES DAILY
Qty: 100 KIT | Refills: 0 | Status: SHIPPED | OUTPATIENT
Start: 2024-02-19

## 2024-02-19 NOTE — ASSESSMENT & PLAN NOTE
Blood pressure elevated.  Reviewed goal of less than 130/70 due to comorbidities.  Will increase lisinopril to 40 mg daily.  To begin monitoring blood pressure while at home.  To call in 1 week with blood pressure readings.

## 2024-02-19 NOTE — ASSESSMENT & PLAN NOTE
Lab Results   Component Value Date    EGFR 48 02/12/2024    EGFR 41 01/05/2024    EGFR 43 01/02/2024    CREATININE 1.49 (H) 02/12/2024    CREATININE 1.70 (H) 01/05/2024    CREATININE 1.63 (H) 01/02/2024   Will continue to monitor creatinine..  Counseled the importance of staying well-hydrated and avoiding nephrotoxic substances.  If creatinine begins to trend up, will decrease lisinopril and add another agent to manage blood pressure.

## 2024-02-19 NOTE — PROGRESS NOTES
Assessment/Plan:    Type 2 diabetes mellitus with other diabetic kidney complication, with long-term current use of insulin ([facility])    Lab Results   Component Value Date    HGBA1C 9.2 (A) 02/19/2024   A1c improved.  Blood sugar 88 today during exam.  Patient has not been monitoring his blood sugar at home as his glucometer is not working.  Stressed the importance of monitoring blood glucose daily.  Foot exam and eye exam completed today.  Counseled importance of consuming a low-carb diet and engaging in daily physical activity as tolerated.  To repeat labs and follow-up in 2 months or sooner if needed.    Essential hypertension  Blood pressure elevated.  Reviewed goal of less than 130/70 due to comorbidities.  Will increase lisinopril to 40 mg daily.  To begin monitoring blood pressure while at home.  To call in 1 week with blood pressure readings.    CKD (chronic kidney disease)  Lab Results   Component Value Date    EGFR 48 02/12/2024    EGFR 41 01/05/2024    EGFR 43 01/02/2024    CREATININE 1.49 (H) 02/12/2024    CREATININE 1.70 (H) 01/05/2024    CREATININE 1.63 (H) 01/02/2024   Will continue to monitor creatinine..  Counseled the importance of staying well-hydrated and avoiding nephrotoxic substances.  If creatinine begins to trend up, will decrease lisinopril and add another agent to manage blood pressure.    Class 1 obesity due to excess calories without serious comorbidity with body mass index (BMI) of 33.0 to 33.9 in adult  Counseled on the importance of weight loss, healthy food choices, engaging in daily physical activity, and reducing BMI.        Diagnoses and all orders for this visit:    Class 2 obesity due to excess calories without serious comorbidity with body mass index (BMI) of 36.0 to 36.9 in adult  -     POCT hemoglobin A1c    Essential hypertension  -     lisinopril (ZESTRIL) 40 mg tablet; Take 1 tablet (40 mg total) by mouth daily    Type 2 diabetes mellitus with other diabetic kidney  complication, with long-term current use of insulin ([facility])  -     Blood Glucose Monitoring Suppl (Contour Next One) KIT; by Device route 2 (two) times a day May substitute brand based on insurance coverage. Check glucose ACHS.  -     Fingerstick Glucose (POCT)  -     IRIS Diabetic eye exam    Chronic kidney disease, unspecified CKD stage    Class 1 obesity due to excess calories without serious comorbidity with body mass index (BMI) of 33.0 to 33.9 in adult    Abscess  Comments:  Healing.  To continue doxycycline as prescribed.    Other orders  -     tamsulosin (FLOMAX) 0.4 mg; Take 0.4 mg by mouth          Subjective:      Patient ID: Blair Caldwell is a 63 y.o. male.    Blair presents for a follow-up.  He was recently evaluated in the ER and started on doxycycline after he underwent an I&D of an abscess above his left eye.  Symptoms have significantly improved since changing antibiotics and having his abscess drain.  He is feeling well.          The following portions of the patient's history were reviewed and updated as appropriate: He   Patient Active Problem List    Diagnosis Date Noted    Type 2 diabetes mellitus with other diabetic kidney complication, with long-term current use of insulin ([facility]) 01/15/2024    Supraglottitis without airway obstruction 01/15/2024    Kidney stone 08/22/2021    Coronary artery disease 08/22/2021    Class 1 obesity due to excess calories without serious comorbidity with body mass index (BMI) of 33.0 to 33.9 in adult 04/27/2021    Chest pain 04/26/2021    CKD (chronic kidney disease) 04/26/2021    Type 2 diabetes mellitus with kidney complication, with long-term current use of insulin ([facility]) 06/29/2014    Essential hypertension 06/29/2014     Current Outpatient Medications   Medication Sig Dispense Refill    atorvastatin (LIPITOR) 10 mg tablet Take 1 tablet (10 mg total) by mouth daily 90 tablet 3    Blood Glucose Monitoring Suppl (Contour Next One) KIT by Device route 2 (two) times  a day May substitute brand based on insurance coverage. Check glucose ACHS. 100 kit 0    clopidogrel (PLAVIX) 75 mg tablet Take 1 tablet (75 mg total) by mouth daily 90 tablet 3    doxycycline hyclate (VIBRAMYCIN) 100 mg capsule Take 1 capsule (100 mg total) by mouth 2 (two) times a day for 10 days 20 capsule 0    insulin aspart (NovoLOG FlexPen) 100 UNIT/ML injection pen Sliding Scale: 0-149=0 units, 150-199=2 units, 200-249=4 units, 250-299=6 units, 300-349=8 units, 350-399=10 units, >400=Call office 8.1 mL 1    insulin degludec (Tresiba FlexTouch) 100 units/mL injection pen Inject 100 Units under the skin daily 60 mL 3    lisinopril (ZESTRIL) 40 mg tablet Take 1 tablet (40 mg total) by mouth daily 30 tablet 2    nitroglycerin (NITROSTAT) 0.4 mg SL tablet Place 1 tablet (0.4 mg total) under the tongue every 5 (five) minutes as needed for chest pain for up to 10 days 10 tablet 0    tamsulosin (FLOMAX) 0.4 mg Take 0.4 mg by mouth      Blood Glucose Monitoring Suppl (OneTouch Verio Reflect) w/Device KIT Check blood sugars four times daily. Please substitute with appropriate alternative as covered by patient's insurance. Dx: E11.65 1 kit 0    glucose blood (Contour Next Test) test strip Check sugar twice daily 100 each 0    glucose blood (OneTouch Verio) test strip Check blood sugars four times daily. Please substitute with appropriate alternative as covered by patient's insurance. Dx: E11.65 400 each 3    Insulin Pen Needle (Advocate Insulin Pen Needles) 31G X 8 MM MISC Use 4 (four) times a day 100 each 3    OneTouch Delica Lancets 33G MISC Check blood sugars four times daily. Please substitute with appropriate alternative as covered by patient's insurance. Dx: E11.65 400 each 3     No current facility-administered medications for this visit.     He is allergic to cat hair extract..    Review of Systems   Constitutional: Negative.    HENT: Negative.     Eyes: Negative.    Respiratory: Negative.     Cardiovascular:  "Negative.    Gastrointestinal: Negative.    Endocrine: Negative.    Genitourinary: Negative.    Musculoskeletal: Negative.    Skin:  Positive for color change.   Allergic/Immunologic: Negative.    Neurological: Negative.    Hematological: Negative.    Psychiatric/Behavioral: Negative.           /84   Pulse 62   Resp 16   Ht 5' 9\" (1.753 m)   Wt 104 kg (230 lb)   SpO2 100%   BMI 33.97 kg/m²     Objective:     Physical Exam  Vitals and nursing note reviewed.   Constitutional:       General: He is not in acute distress.     Appearance: Normal appearance. He is well-developed. He is not ill-appearing, toxic-appearing or diaphoretic.   HENT:      Head: Normocephalic and atraumatic.   Eyes:      Conjunctiva/sclera: Conjunctivae normal.   Cardiovascular:      Rate and Rhythm: Normal rate and regular rhythm.      Pulses: no weak pulses.           Dorsalis pedis pulses are 2+ on the right side and 2+ on the left side.      Heart sounds: Normal heart sounds. No murmur heard.  Pulmonary:      Effort: Pulmonary effort is normal. No respiratory distress.      Breath sounds: Normal breath sounds. No wheezing or rales.   Chest:      Chest wall: No tenderness.   Musculoskeletal:      Cervical back: Neck supple.   Feet:      Right foot:      Skin integrity: Callus and dry skin present. No ulcer, skin breakdown, erythema or warmth.      Left foot:      Skin integrity: Callus and dry skin present. No ulcer, skin breakdown, erythema or warmth.   Skin:     General: Skin is warm and dry.      Capillary Refill: Capillary refill takes less than 2 seconds.          Neurological:      General: No focal deficit present.      Mental Status: He is alert and oriented to person, place, and time.   Psychiatric:         Mood and Affect: Mood normal.         Behavior: Behavior normal.         Thought Content: Thought content normal.         Judgment: Judgment normal.           Patient's shoes and socks removed.    Right Foot/Ankle "   Right Foot Inspection  Skin Exam: skin normal, dry skin, callus and callus. No warmth, no erythema, no maceration, no abnormal color, no pre-ulcer and no ulcer.     Toe Exam: ROM and strength within normal limits.     Sensory   Monofilament testing: intact    Vascular  Capillary refills: < 3 seconds  The right DP pulse is 2+.     Left Foot/Ankle  Left Foot Inspection  Skin Exam: skin normal, skin intact, dry skin and callus. No warmth, no erythema, no maceration, normal color, no pre-ulcer and no ulcer.     Toe Exam: ROM and strength within normal limits.     Sensory   Monofilament testing: intact    Vascular  Capillary refills: < 3 seconds  The left DP pulse is 2+.     Assign Risk Category  No deformity present  No loss of protective sensation  No weak pulses  Risk: 0

## 2024-02-19 NOTE — ASSESSMENT & PLAN NOTE
Counseled on the importance of weight loss, healthy food choices, engaging in daily physical activity, and reducing BMI.

## 2024-02-19 NOTE — ASSESSMENT & PLAN NOTE
Lab Results   Component Value Date    HGBA1C 9.2 (A) 02/19/2024   A1c improved.  Blood sugar 88 today during exam.  Patient has not been monitoring his blood sugar at home as his glucometer is not working.  Stressed the importance of monitoring blood glucose daily.  Foot exam and eye exam completed today.  Counseled importance of consuming a low-carb diet and engaging in daily physical activity as tolerated.  To repeat labs and follow-up in 2 months or sooner if needed.

## 2024-03-03 LAB — COLOGUARD RESULT REPORTABLE: NORMAL

## 2024-03-12 ENCOUNTER — TELEPHONE (OUTPATIENT)
Dept: FAMILY MEDICINE CLINIC | Facility: CLINIC | Age: 64
End: 2024-03-12

## 2024-03-12 NOTE — TELEPHONE ENCOUNTER
"Spoke with patient- he stated that he has been seeing an orthopedic doctor for his knee. He is currently bone on bone. He is requesting a \"AC1\" test... I think he means A1C in order to determine what they can do with him next. He is [facility].    Please advise, thank you!  "

## 2024-03-13 NOTE — TELEPHONE ENCOUNTER
Tatiana can we find out a little more please. Are they trying to see if her is a diabetic, he should come in for a visit that way if more labs need to be ordered that can be done

## 2024-03-13 NOTE — TELEPHONE ENCOUNTER
Called Dr. Sena's office. Spoke with his Medical Assistant. She stated that patient presented to Dr. Sena's office yesterday for right knee pain and so the plan of action would be that the patient eventually wants surgery done. They need a new A1C ordered so they can clear him to have a cortisone injection and he will follow up with Dr. Sena after the A1C so they can determine if surgery is feasible.    Are we able to enter a lab slip in for the A1C?    Please advise, thank you!

## 2024-03-14 DIAGNOSIS — Z79.4 TYPE 2 DIABETES MELLITUS WITH OTHER DIABETIC KIDNEY COMPLICATION, WITH LONG-TERM CURRENT USE OF INSULIN (HCC): Primary | ICD-10-CM

## 2024-03-14 DIAGNOSIS — E11.29 TYPE 2 DIABETES MELLITUS WITH OTHER DIABETIC KIDNEY COMPLICATION, WITH LONG-TERM CURRENT USE OF INSULIN (HCC): Primary | ICD-10-CM

## 2024-03-14 NOTE — TELEPHONE ENCOUNTER
Tawanna Head I didn't see the order in his chart can you please put the order in for him? Thank you

## 2024-03-19 ENCOUNTER — APPOINTMENT (OUTPATIENT)
Dept: LAB | Facility: HOSPITAL | Age: 64
End: 2024-03-19
Payer: COMMERCIAL

## 2024-03-19 DIAGNOSIS — E11.29 TYPE 2 DIABETES MELLITUS WITH OTHER DIABETIC KIDNEY COMPLICATION, WITH LONG-TERM CURRENT USE OF INSULIN (HCC): ICD-10-CM

## 2024-03-19 DIAGNOSIS — Z79.4 TYPE 2 DIABETES MELLITUS WITH OTHER DIABETIC KIDNEY COMPLICATION, WITH LONG-TERM CURRENT USE OF INSULIN (HCC): ICD-10-CM

## 2024-03-19 LAB
EST. AVERAGE GLUCOSE BLD GHB EST-MCNC: 186 MG/DL
HBA1C MFR BLD: 8.1 %

## 2024-03-19 PROCEDURE — 83036 HEMOGLOBIN GLYCOSYLATED A1C: CPT

## 2024-03-19 PROCEDURE — 36415 COLL VENOUS BLD VENIPUNCTURE: CPT

## 2024-03-27 ENCOUNTER — HOSPITAL ENCOUNTER (EMERGENCY)
Facility: HOSPITAL | Age: 64
Discharge: HOME | End: 2024-03-27
Attending: EMERGENCY MEDICINE
Payer: COMMERCIAL

## 2024-03-27 VITALS
SYSTOLIC BLOOD PRESSURE: 190 MMHG | OXYGEN SATURATION: 97 % | HEART RATE: 69 BPM | RESPIRATION RATE: 19 BRPM | TEMPERATURE: 98.8 F | DIASTOLIC BLOOD PRESSURE: 92 MMHG

## 2024-03-27 DIAGNOSIS — L03.90 CELLULITIS: Primary | ICD-10-CM

## 2024-03-27 PROCEDURE — 99282 EMERGENCY DEPT VISIT SF MDM: CPT

## 2024-03-27 PROCEDURE — 99284 EMERGENCY DEPT VISIT MOD MDM: CPT

## 2024-03-27 RX ORDER — DOXYCYCLINE HYCLATE 100 MG/1
100 CAPSULE ORAL 2 TIMES DAILY
Qty: 14 CAPSULE | Refills: 0 | Status: SHIPPED | OUTPATIENT
Start: 2024-03-27 | End: 2024-04-03

## 2024-03-27 RX ORDER — DOXYCYCLINE HYCLATE 100 MG/1
100 CAPSULE ORAL ONCE
Status: COMPLETED | OUTPATIENT
Start: 2024-03-27 | End: 2024-03-27

## 2024-03-27 RX ADMIN — DOXYCYCLINE HYCLATE 100 MG: 100 CAPSULE ORAL at 05:00

## 2024-03-27 NOTE — ED PROVIDER NOTES
"History  Chief Complaint   Patient presents with    Cellulitis     Patient presents complaining of \"a pimple on my nose, it's killing me\". Patient presents with mild redness to right side of nose that patient reports is painful. Reports symptoms have been present for \"a few days\". Reports previous visits for pimples to legs and other body areas, (-) MRSA history.        The patient is a 63 y.o. male with a history of Diabetes, enlarged prostate, hypertension, kidney stones, MI who presents to [facility] with a chief complaint of pimple to the outside of the right nare. Symptoms began a few days ago and have been constant since onset. His pain is currently rated as a 4/10 in severity and described as throbbing without radiation. Associated symptoms include soreness. Symptoms are aggravated with touching and alleviating factors include none noted. The patient denies fever, chills, night sweats, chest pain, shortness of breath, headache, dizziness, lightheadedness, epistaxis, congestion, facial swelling, tinnitus, jaw pain, falls, trauma. He reports similar symptoms over the past few months. No other reported symptoms at this time.  Patient affirms allergies to cat hair          History provided by:  Patient   used: No        Prior to Admission Medications   Prescriptions Last Dose Informant Patient Reported? Taking?   Blood Glucose Monitoring Suppl (Contour Next One) KIT   No No   Sig: by Device route 2 (two) times a day May substitute brand based on insurance coverage. Check glucose ACHS.   Blood Glucose Monitoring Suppl (OneTouch Verio Reflect) w/Device KIT   No No   Sig: Check blood sugars four times daily. Please substitute with appropriate alternative as covered by patient's insurance. Dx: E11.65   Insulin Pen Needle (Advocate Insulin Pen Needles) 31G X 8 MM MISC   No No   Sig: Use 4 (four) times a day   OneTouch Delica Lancets 33G MISC   No No   Sig: Check blood sugars four " times daily. Please substitute with appropriate alternative as covered by patient's insurance. Dx: E11.65   atorvastatin (LIPITOR) 10 mg tablet   No No   Sig: Take 1 tablet (10 mg total) by mouth daily   clopidogrel (PLAVIX) 75 mg tablet   No No   Sig: Take 1 tablet (75 mg total) by mouth daily   glucose blood (Contour Next Test) test strip   No No   Sig: Check sugar twice daily   glucose blood (OneTouch Verio) test strip   No No   Sig: Check blood sugars four times daily. Please substitute with appropriate alternative as covered by patient's insurance. Dx: E11.65   insulin aspart (NovoLOG FlexPen) 100 UNIT/ML injection pen   No No   Sig: Sliding Scale: 0-149=0 units, 150-199=2 units, 200-249=4 units, 250-299=6 units, 300-349=8 units, 350-399=10 units, >400=Call office   insulin degludec (Tresiba FlexTouch) 100 units/mL injection pen   No No   Sig: Inject 100 Units under the skin daily   lisinopril (ZESTRIL) 40 mg tablet   No No   Sig: Take 1 tablet (40 mg total) by mouth daily   nitroglycerin (NITROSTAT) 0.4 mg SL tablet   No No   Sig: Place 1 tablet (0.4 mg total) under the tongue every 5 (five) minutes as needed for chest pain for up to 10 days   tamsulosin (FLOMAX) 0.4 mg   Yes No   Sig: Take 0.4 mg by mouth      Facility-Administered Medications: None       Past Medical History:   Diagnosis Date    Diabetes mellitus (HCC)     Enlarged prostate     Hypertension     Kidney stones     Myocardial infarction (HCC)        Past Surgical History:   Procedure Laterality Date    HAND SURGERY      TONSILLECTOMY         Family History   Problem Relation Age of Onset    Lung cancer Father      I have reviewed and agree with the history as documented.    E-Cigarette/Vaping    E-Cigarette Use Never User      E-Cigarette/Vaping Substances     Social History     Tobacco Use    Smoking status: Never    Smokeless tobacco: Never   Vaping Use    Vaping status: Never Used   Substance Use Topics    Alcohol use: Yes    Drug use: Never        Review of Systems   Constitutional:  Negative for chills and fever.   HENT:  Negative for ear pain and sore throat.         Nose pain   Eyes:  Negative for pain and visual disturbance.   Respiratory:  Negative for cough and shortness of breath.    Cardiovascular:  Negative for chest pain and palpitations.   Gastrointestinal:  Negative for abdominal pain and vomiting.   Genitourinary:  Negative for dysuria and hematuria.   Musculoskeletal:  Negative for arthralgias and back pain.   Skin:  Negative for color change and rash.   Neurological:  Negative for seizures and syncope.   All other systems reviewed and are negative.      Physical Exam  Physical Exam  Vitals reviewed.   Constitutional:       General: He is not in acute distress.     Appearance: Normal appearance. He is not ill-appearing.   HENT:      Head: Normocephalic and atraumatic.      Right Ear: Tympanic membrane, ear canal and external ear normal.      Left Ear: Tympanic membrane, ear canal and external ear normal.      Nose: Nasal tenderness present. No nasal deformity, septal deviation, signs of injury, laceration, mucosal edema or congestion.      Right Nostril: No foreign body, epistaxis, septal hematoma or occlusion.      Left Nostril: No foreign body, epistaxis, septal hematoma or occlusion.      Right Turbinates: Not enlarged, swollen or pale.      Left Turbinates: Not enlarged, swollen or pale.      Right Sinus: No maxillary sinus tenderness or frontal sinus tenderness.      Left Sinus: No maxillary sinus tenderness or frontal sinus tenderness.     Cardiovascular:      Rate and Rhythm: Normal rate.      Pulses: Normal pulses.   Pulmonary:      Effort: Pulmonary effort is normal. No respiratory distress.      Breath sounds: No stridor. No wheezing or rhonchi.   Abdominal:      General: Abdomen is flat.      Tenderness: There is no abdominal tenderness.   Musculoskeletal:         General: No swelling. Normal range of motion.      Cervical back:  Normal range of motion. No rigidity or tenderness.   Skin:     General: Skin is warm and dry.      Capillary Refill: Capillary refill takes less than 2 seconds.   Neurological:      Mental Status: He is alert and oriented to person, place, and time. Mental status is at baseline.         Vital Signs  ED Triage Vitals [03/27/24 0226]   Temperature Pulse Respirations Blood Pressure SpO2   98.8 °F (37.1 °C) 69 19 (!) 190/92 97 %      Temp Source Heart Rate Source Patient Position - Orthostatic VS BP Location FiO2 (%)   Oral Monitor Sitting Left arm --      Pain Score       --           Vitals:    03/27/24 0226   BP: (!) 190/92   Pulse: 69   Patient Position - Orthostatic VS: Sitting         Visual Acuity      ED Medications  Medications   doxycycline hyclate (VIBRAMYCIN) capsule 100 mg (100 mg Oral Given 3/27/24 0500)       Diagnostic Studies  Results Reviewed       None                   No orders to display              Procedures  Procedures         ED Course                               SBIRT 20yo+      Flowsheet Row Most Recent Value   Initial Alcohol Screen: US AUDIT-C     1. How often do you have a drink containing alcohol? 0 Filed at: 03/27/2024 0459   2. How many drinks containing alcohol do you have on a typical day you are drinking?  0 Filed at: 03/27/2024 0459   3a. Male UNDER 65: How often do you have five or more drinks on one occasion? 0 Filed at: 03/27/2024 0459   Audit-C Score 0 Filed at: 03/27/2024 0459   TRACE: How many times in the past year have you...    Used an illegal drug or used a prescription medication for non-medical reasons? Never Filed at: 03/27/2024 0459                      Medical Decision Making  The patient is a 63 y.o. male with a history of Diabetes, enlarged prostate, hypertension, kidney stones, MI who presents to [facility] with a chief complaint of pimple to the outside of the right nare.   Patient reports similar symptoms over the past couple months. Patient  has normal exam, no deficits, sinus tenderness, facial swelling or erythema, normal eye exam.  He reports that he has improvement with warm compresses and antibiotics in the past.  Discussed that the few times that he has been here recently his sugar has been elevated.  Discussed that symptoms of abscesses in random places throughout the body that are new is not a normal thing.  Recommended discussing this with his primary care and discussing controlling his sugar more tightly. Discussed red flag symptoms and when to return to the ER. Will provide abx and close PCP follow up. Prior to discharge, discharge instructions were discussed with patient at bedside. Patient was provided both verbal and written instructions. Patient is understanding of the discharge instructions and is agreeable to plan of care. Return precautions were discussed with patient bedside, patient verbalized understanding of signs and symptoms that would necessitate return to the ED. All questions were answered. Patient was comfortable with the plan of care and discharged to home.       Problems Addressed:  Cellulitis: acute illness or injury    Risk  Prescription drug management.             Disposition  Final diagnoses:   Cellulitis     Time reflects when diagnosis was documented in both MDM as applicable and the Disposition within this note       Time User Action Codes Description Comment    3/27/2024  4:48 AM Inder Gibson Add [L03.90] Cellulitis           ED Disposition       ED Disposition   Discharge    Condition   Stable    Date/Time   Wed Mar 27, 2024 2999    Comment   Blair Caldwell discharge to home/self care.                   Follow-up Information       Follow up With Specialties Details Why Contact Info Additional Information    [facility], Nurse Practitioner Schedule an appointment as soon as possible for a visit   [address]  [facility] [address]  [phone]       [facility]  [place] [facility] Emergency Medicine  If symptoms worsen [address] [id number] [phone] [facility], [address] [place], [place], [address]            Discharge Medication List as of 3/27/2024  4:49 AM        START taking these medications    Details   doxycycline hyclate (VIBRAMYCIN) 100 mg capsule Take 1 capsule (100 mg total) by mouth 2 (two) times a day for 7 days, Starting Wed 3/27/2024, Until Wed 4/3/2024, Normal           CONTINUE these medications which have NOT CHANGED    Details   atorvastatin (LIPITOR) 10 mg tablet Take 1 tablet (10 mg total) by mouth daily, Starting Tue 1/16/2024, Normal      Blood Glucose Monitoring Suppl (Contour Next One) KIT by Device route 2 (two) times a day May substitute brand based on insurance coverage. Check glucose ACHS., Starting Mon 2/19/2024, Normal      Blood Glucose Monitoring Suppl (OneTouch Verio Reflect) w/Device KIT Check blood sugars four times daily. Please substitute with appropriate alternative as covered by patient's insurance. Dx: E11.65, Normal      clopidogrel (PLAVIX) 75 mg tablet Take 1 tablet (75 mg total) by mouth daily, Starting Tue 1/16/2024, Until Fri 1/10/2025, Normal      !! glucose blood (Contour Next Test) test strip Check sugar twice daily, Normal      !! glucose blood (OneTouch Verio) test strip Check blood sugars four times daily. Please substitute with appropriate alternative as covered by patient's insurance. Dx: E11.65, Normal      insulin aspart (NovoLOG FlexPen) 100 UNIT/ML injection pen Sliding Scale: 0-149=0 units, 150-199=2 units, 200-249=4 units, 250-299=6 units, 300-349=8 units, 350-399=10 units, >400=Call office, Normal      insulin degludec (Tresiba FlexTouch) 100 units/mL injection pen Inject 100 Units under the skin daily, Starting Tue 1/16/2024, Normal      Insulin Pen Needle (Advocate Insulin Pen Needles) 31G X 8 MM MISC Use 4  (four) times a day, Starting Tue 1/16/2024, Normal      lisinopril (ZESTRIL) 40 mg tablet Take 1 tablet (40 mg total) by mouth daily, Starting Mon 2/19/2024, Normal      nitroglycerin (NITROSTAT) 0.4 mg SL tablet Place 1 tablet (0.4 mg total) under the tongue every 5 (five) minutes as needed for chest pain for up to 10 days, Starting Mon 8/23/2021, Until Mon 2/19/2024 at 2359, Normal      OneTouch Delica Lancets 33G MISC Check blood sugars four times daily. Please substitute with appropriate alternative as covered by patient's insurance. Dx: E11.65, Normal      tamsulosin (FLOMAX) 0.4 mg Take 0.4 mg by mouth, Starting Fri 1/19/2024, Until Sat 1/18/2025 at 2359, Historical Med       !! - Potential duplicate medications found. Please discuss with provider.          No discharge procedures on file.    PDMP Review         Value Time User    PDMP Reviewed  Yes 1/5/2024  6:49 AM Sallie Ibarra PA-C            ED Provider  Electronically Signed by             Inder Gibson PA-C  03/27/24 0858       Inder Gibson PA-C  03/27/24 0859

## 2024-03-27 NOTE — DISCHARGE INSTRUCTIONS
Follow-up with PCP.  Discussed sugar control.  Take antibiotics as prescribed.  If any symptoms worsen or new symptoms appear return to the ER.

## 2024-03-28 ENCOUNTER — TELEPHONE (OUTPATIENT)
Dept: NEPHROLOGY | Facility: CLINIC | Age: 64
End: 2024-03-28

## 2024-03-28 DIAGNOSIS — N18.31 STAGE 3A CHRONIC KIDNEY DISEASE (HCC): Primary | ICD-10-CM

## 2024-03-28 NOTE — TELEPHONE ENCOUNTER
Called spoke with patient advised patient to get non-fasting labs done 1 week prior to 04/09/24 consult appointment with Dr.Jwalant Johny MD. patient verbalized understanding and is okay with it. lab orders in [facility].

## 2024-04-08 ENCOUNTER — TELEPHONE (OUTPATIENT)
Dept: NEPHROLOGY | Facility: CLINIC | Age: 64
End: 2024-04-08

## 2024-04-08 ENCOUNTER — APPOINTMENT (OUTPATIENT)
Dept: LAB | Facility: HOSPITAL | Age: 64
End: 2024-04-08
Payer: COMMERCIAL

## 2024-04-08 DIAGNOSIS — N18.31 STAGE 3A CHRONIC KIDNEY DISEASE (HCC): ICD-10-CM

## 2024-04-08 LAB
25(OH)D3 SERPL-MCNC: 19.7 NG/ML (ref 30–100)
ANION GAP SERPL CALCULATED.3IONS-SCNC: 12 MMOL/L (ref 4–13)
BACTERIA UR QL AUTO: ABNORMAL /HPF
BASOPHILS # BLD AUTO: 0.06 THOUSANDS/ÂΜL (ref 0–0.1)
BASOPHILS NFR BLD AUTO: 1 % (ref 0–1)
BILIRUB UR QL STRIP: NEGATIVE
BUN SERPL-MCNC: 42 MG/DL (ref 5–25)
CALCIUM SERPL-MCNC: 10 MG/DL (ref 8.4–10.2)
CHLORIDE SERPL-SCNC: 101 MMOL/L (ref 96–108)
CLARITY UR: CLEAR
CO2 SERPL-SCNC: 19 MMOL/L (ref 21–32)
COLOR UR: ABNORMAL
CREAT SERPL-MCNC: 1.97 MG/DL (ref 0.6–1.3)
CREAT UR-MCNC: 59.8 MG/DL
EOSINOPHIL # BLD AUTO: 0.3 THOUSAND/ÂΜL (ref 0–0.61)
EOSINOPHIL NFR BLD AUTO: 3 % (ref 0–6)
ERYTHROCYTE [DISTWIDTH] IN BLOOD BY AUTOMATED COUNT: 12.7 % (ref 11.6–15.1)
GFR SERPL CREATININE-BSD FRML MDRD: 34 ML/MIN/1.73SQ M
GLUCOSE P FAST SERPL-MCNC: 528 MG/DL (ref 65–99)
GLUCOSE UR STRIP-MCNC: ABNORMAL MG/DL
HCT VFR BLD AUTO: 47.7 % (ref 36.5–49.3)
HGB BLD-MCNC: 17.1 G/DL (ref 12–17)
HGB UR QL STRIP.AUTO: NEGATIVE
IMM GRANULOCYTES # BLD AUTO: 0.05 THOUSAND/UL (ref 0–0.2)
IMM GRANULOCYTES NFR BLD AUTO: 0 % (ref 0–2)
KETONES UR STRIP-MCNC: NEGATIVE MG/DL
LEUKOCYTE ESTERASE UR QL STRIP: ABNORMAL
LYMPHOCYTES # BLD AUTO: 2.53 THOUSANDS/ÂΜL (ref 0.6–4.47)
LYMPHOCYTES NFR BLD AUTO: 21 % (ref 14–44)
MCH RBC QN AUTO: 28.7 PG (ref 26.8–34.3)
MCHC RBC AUTO-ENTMCNC: 35.8 G/DL (ref 31.4–37.4)
MCV RBC AUTO: 80 FL (ref 82–98)
MICROALBUMIN UR-MCNC: 176.4 MG/L
MICROALBUMIN/CREAT 24H UR: 295 MG/G CREATININE (ref 0–30)
MONOCYTES # BLD AUTO: 0.64 THOUSAND/ÂΜL (ref 0.17–1.22)
MONOCYTES NFR BLD AUTO: 5 % (ref 4–12)
NEUTROPHILS # BLD AUTO: 8.26 THOUSANDS/ÂΜL (ref 1.85–7.62)
NEUTS SEG NFR BLD AUTO: 70 % (ref 43–75)
NITRITE UR QL STRIP: NEGATIVE
NON-SQ EPI CELLS URNS QL MICRO: ABNORMAL /HPF
NRBC BLD AUTO-RTO: 0 /100 WBCS
PH UR STRIP.AUTO: 5 [PH]
PHOSPHATE SERPL-MCNC: 3.1 MG/DL (ref 2.3–4.1)
PLATELET # BLD AUTO: 304 THOUSANDS/UL (ref 149–390)
PMV BLD AUTO: 9.6 FL (ref 8.9–12.7)
POTASSIUM SERPL-SCNC: 4.6 MMOL/L (ref 3.5–5.3)
PROT UR STRIP-MCNC: ABNORMAL MG/DL
PTH-INTACT SERPL-MCNC: 55.1 PG/ML (ref 12–88)
RBC # BLD AUTO: 5.95 MILLION/UL (ref 3.88–5.62)
RBC #/AREA URNS AUTO: ABNORMAL /HPF
SODIUM SERPL-SCNC: 132 MMOL/L (ref 135–147)
SP GR UR STRIP.AUTO: 1.03 (ref 1–1.03)
URATE SERPL-MCNC: 8.5 MG/DL (ref 3.5–8.5)
UROBILINOGEN UR STRIP-ACNC: <2 MG/DL
WBC # BLD AUTO: 11.84 THOUSAND/UL (ref 4.31–10.16)
WBC #/AREA URNS AUTO: ABNORMAL /HPF

## 2024-04-08 PROCEDURE — 36415 COLL VENOUS BLD VENIPUNCTURE: CPT

## 2024-04-08 PROCEDURE — 82306 VITAMIN D 25 HYDROXY: CPT

## 2024-04-08 PROCEDURE — 84100 ASSAY OF PHOSPHORUS: CPT

## 2024-04-08 PROCEDURE — 80048 BASIC METABOLIC PNL TOTAL CA: CPT

## 2024-04-08 PROCEDURE — 83970 ASSAY OF PARATHORMONE: CPT

## 2024-04-08 PROCEDURE — 81001 URINALYSIS AUTO W/SCOPE: CPT

## 2024-04-08 PROCEDURE — 85025 COMPLETE CBC W/AUTO DIFF WBC: CPT

## 2024-04-08 PROCEDURE — 84550 ASSAY OF BLOOD/URIC ACID: CPT

## 2024-04-08 PROCEDURE — 82043 UR ALBUMIN QUANTITATIVE: CPT

## 2024-04-08 PROCEDURE — 82570 ASSAY OF URINE CREATININE: CPT

## 2024-04-08 NOTE — TELEPHONE ENCOUNTER
Appointment was confirmed with patient. Reminded patient to have labs done prior appointment tomorrow. Patient stated he will go in today to have labs I also let patient know labs are non-fasting

## 2024-04-09 ENCOUNTER — CONSULT (OUTPATIENT)
Dept: NEPHROLOGY | Facility: CLINIC | Age: 64
End: 2024-04-09

## 2024-04-09 VITALS
DIASTOLIC BLOOD PRESSURE: 78 MMHG | WEIGHT: 225.4 LBS | RESPIRATION RATE: 18 BRPM | BODY MASS INDEX: 33.38 KG/M2 | OXYGEN SATURATION: 98 % | SYSTOLIC BLOOD PRESSURE: 108 MMHG | HEART RATE: 68 BPM | HEIGHT: 69 IN | TEMPERATURE: 97.8 F

## 2024-04-09 DIAGNOSIS — Z79.4 TYPE 2 DIABETES MELLITUS WITH STAGE 3 CHRONIC KIDNEY DISEASE, WITH LONG-TERM CURRENT USE OF INSULIN, UNSPECIFIED WHETHER STAGE 3A OR 3B CKD (HCC): ICD-10-CM

## 2024-04-09 DIAGNOSIS — I12.9 HYPERTENSIVE KIDNEY DISEASE WITH STAGE 3 CHRONIC KIDNEY DISEASE, UNSPECIFIED WHETHER STAGE 3A OR 3B CKD (HCC): ICD-10-CM

## 2024-04-09 DIAGNOSIS — R80.8 OTHER PROTEINURIA: ICD-10-CM

## 2024-04-09 DIAGNOSIS — N18.32 STAGE 3B CHRONIC KIDNEY DISEASE (HCC): Primary | ICD-10-CM

## 2024-04-09 DIAGNOSIS — E55.9 VITAMIN D DEFICIENCY: ICD-10-CM

## 2024-04-09 DIAGNOSIS — E11.22 TYPE 2 DIABETES MELLITUS WITH STAGE 3 CHRONIC KIDNEY DISEASE, WITH LONG-TERM CURRENT USE OF INSULIN, UNSPECIFIED WHETHER STAGE 3A OR 3B CKD (HCC): ICD-10-CM

## 2024-04-09 DIAGNOSIS — N18.30 TYPE 2 DIABETES MELLITUS WITH STAGE 3 CHRONIC KIDNEY DISEASE, WITH LONG-TERM CURRENT USE OF INSULIN, UNSPECIFIED WHETHER STAGE 3A OR 3B CKD (HCC): ICD-10-CM

## 2024-04-09 DIAGNOSIS — N18.30 HYPERTENSIVE KIDNEY DISEASE WITH STAGE 3 CHRONIC KIDNEY DISEASE, UNSPECIFIED WHETHER STAGE 3A OR 3B CKD (HCC): ICD-10-CM

## 2024-04-09 LAB — COLOGUARD RESULT REPORTABLE: NORMAL

## 2024-04-09 NOTE — PROGRESS NOTES
NEPHROLOGY OFFICE CONSULT  Blair Caldwell 63 y.o.male YOB: 1960 MRN: [id number]    Encounter: [id number] DATE: 4/9/2024    REASON FOR VISIT: Blair Caldwell is a 63 y.o.male who was referred by  for further management of chronic kidney disease    HPI:    This is 63-year-old male with past medical history significant for diabetes type 2, hypertension, coronary artery disease, BPH, referred to nephrology for further management of CKD stage 3.    Upon review of old medical record from [facility] chart review, patient's baseline creatinine seems to be fluctuating and in the past baseline creatinine was thought to be around 1.5-1.7.    Patient has underlying diabetes type 2 and currently taking insulin.  On recent BMP found to have blood glucose level of 528.  Last known HbA1c is 8.1% [3/19/2024].  According to patient he does not check blood glucose level at home    Patient has underlying hypertension and has been compliant with his antihypertensive medication.  According to patient he does not check blood pressure at home    Patient also have underlying enlarged prostate and currently taking Flomax.  Patient used to see Dr. Walters in the past but no longer being followed by any urologist.  Currently also denies any urinary complaint.  According to patient he had a kidney stone which was removed in [place] many years back.  Underwent CT scan of abdomen on 8/30/2021 and reviewed results which showed 4.5 cm right-sided renal cyst and also enlarged prostate.    Patient has underlying coronary artery disease and had PCI performed in the past and now taking Plavix.    Send also have underlying hyperlipidemia and currently taking statin    Patient takes all the medications as prescribed and denies use of NSAIDs        REVIEW OF SYSTEMS:    Review of Systems   Constitutional:  Negative for chills and fever.   HENT:  Negative for nosebleeds.    Eyes:  Negative for photophobia and pain.   Respiratory:  Negative for  choking.    Cardiovascular:  Negative for palpitations.   Gastrointestinal:  Negative for blood in stool.   Genitourinary:  Negative for hematuria.   Musculoskeletal:  Negative for neck stiffness.   Neurological:  Negative for seizures.   Psychiatric/Behavioral:  Negative for confusion and suicidal ideas.          PAST MEDICAL HISTORY:  Past Medical History:   Diagnosis Date    Chronic kidney disease     Diabetes mellitus (HCC)     Enlarged prostate     Hypertension     Kidney stone     Kidney stones     Myocardial infarction (HCC)        PAST SURGICAL HISTORY:  Past Surgical History:   Procedure Laterality Date    HAND SURGERY      TONSILLECTOMY         SOCIAL HISTORY:  Social History     Substance and Sexual Activity   Alcohol Use Yes     Social History     Substance and Sexual Activity   Drug Use Never     Social History     Tobacco Use   Smoking Status Never    Passive exposure: Never   Smokeless Tobacco Never       FAMILY HISTORY:  Family History   Problem Relation Age of Onset    Lung cancer Father        ALLERGY:  Allergies   Allergen Reactions    Cat Hair Extract Shortness Of Breath       MEDICATIONS:    Current Outpatient Medications:     atorvastatin (LIPITOR) 10 mg tablet, Take 1 tablet (10 mg total) by mouth daily, Disp: 90 tablet, Rfl: 3    Blood Glucose Monitoring Suppl (Contour Next One) KIT, by Device route 2 (two) times a day May substitute brand based on insurance coverage. Check glucose ACHS., Disp: 100 kit, Rfl: 0    Blood Glucose Monitoring Suppl (OneTouch Verio Reflect) w/Device KIT, Check blood sugars four times daily. Please substitute with appropriate alternative as covered by patient's insurance. Dx: E11.65, Disp: 1 kit, Rfl: 0    Cholecalciferol 50 MCG (2000 UT) TABS, Take 1 tablet (2,000 Units total) by mouth daily, Disp: , Rfl:     clopidogrel (PLAVIX) 75 mg tablet, Take 1 tablet (75 mg total) by mouth daily, Disp: 90 tablet, Rfl: 3    glucose blood (Contour Next Test) test strip, Check  "sugar twice daily, Disp: 100 each, Rfl: 0    glucose blood (OneTouch Verio) test strip, Check blood sugars four times daily. Please substitute with appropriate alternative as covered by patient's insurance. Dx: E11.65, Disp: 400 each, Rfl: 3    insulin aspart (NovoLOG FlexPen) 100 UNIT/ML injection pen, Sliding Scale: 0-149=0 units, 150-199=2 units, 200-249=4 units, 250-299=6 units, 300-349=8 units, 350-399=10 units, >400=Call office, Disp: 8.1 mL, Rfl: 1    insulin degludec (Tresiba FlexTouch) 100 units/mL injection pen, Inject 100 Units under the skin daily, Disp: 60 mL, Rfl: 3    Insulin Pen Needle (Advocate Insulin Pen Needles) 31G X 8 MM MISC, Use 4 (four) times a day, Disp: 100 each, Rfl: 3    lisinopril (ZESTRIL) 40 mg tablet, Take 1 tablet (40 mg total) by mouth daily, Disp: 30 tablet, Rfl: 2    OneTouch Delica Lancets 33G MISC, Check blood sugars four times daily. Please substitute with appropriate alternative as covered by patient's insurance. Dx: E11.65, Disp: 400 each, Rfl: 3    tamsulosin (FLOMAX) 0.4 mg, Take 0.4 mg by mouth, Disp: , Rfl:     nitroglycerin (NITROSTAT) 0.4 mg SL tablet, Place 1 tablet (0.4 mg total) under the tongue every 5 (five) minutes as needed for chest pain for up to 10 days (Patient not taking: Reported on 4/9/2024), Disp: 10 tablet, Rfl: 0    PHYSICAL EXAM:  Vitals:    04/09/24 0900   BP: 108/78   Pulse: 68   Resp: 18   Temp: 97.8 °F (36.6 °C)   TempSrc: Temporal   SpO2: 98%   Weight: 102 kg (225 lb 6.4 oz)   Height: 5' 9\" (1.753 m)     Body mass index is 33.29 kg/m².    Physical Exam  Constitutional:       General: He is not in acute distress.  HENT:      Right Ear: External ear normal.   Eyes:      General: No scleral icterus.     Conjunctiva/sclera:      Right eye: No hemorrhage.  Neck:      Thyroid: No thyromegaly.      Vascular: No JVD.   Cardiovascular:      Rate and Rhythm: Normal rate.   Pulmonary:      Effort: Pulmonary effort is normal. No accessory muscle usage or " respiratory distress.      Breath sounds: No wheezing.   Abdominal:      General: There is no distension.   Musculoskeletal:      Right ankle: No swelling.      Left ankle: No swelling.   Skin:     General: Skin is warm.      Coloration: Skin is not jaundiced.   Neurological:      Mental Status: He is alert. He is not disoriented.   Psychiatric:         Speech: He is communicative.         Behavior: Behavior is not combative.         LAB RESULTS:  Results for orders placed or performed in visit on 04/08/24   Basic metabolic panel   Result Value Ref Range    Sodium 132 (L) 135 - 147 mmol/L    Potassium 4.6 3.5 - 5.3 mmol/L    Chloride 101 96 - 108 mmol/L    CO2 19 (L) 21 - 32 mmol/L    ANION GAP 12 4 - 13 mmol/L    BUN 42 (H) 5 - 25 mg/dL    Creatinine 1.97 (H) 0.60 - 1.30 mg/dL    Glucose, Fasting 528 (HH) 65 - 99 mg/dL    Calcium 10.0 8.4 - 10.2 mg/dL    eGFR 34 ml/min/1.73sq m   CBC and differential   Result Value Ref Range    WBC 11.84 (H) 4.31 - 10.16 Thousand/uL    RBC 5.95 (H) 3.88 - 5.62 Million/uL    Hemoglobin 17.1 (H) 12.0 - 17.0 g/dL    Hematocrit 47.7 36.5 - 49.3 %    MCV 80 (L) 82 - 98 fL    MCH 28.7 26.8 - 34.3 pg    MCHC 35.8 31.4 - 37.4 g/dL    RDW 12.7 11.6 - 15.1 %    MPV 9.6 8.9 - 12.7 fL    Platelets 304 149 - 390 Thousands/uL    nRBC 0 /100 WBCs    Segmented % 70 43 - 75 %    Immature Grans % 0 0 - 2 %    Lymphocytes % 21 14 - 44 %    Monocytes % 5 4 - 12 %    Eosinophils Relative 3 0 - 6 %    Basophils Relative 1 0 - 1 %    Absolute Neutrophils 8.26 (H) 1.85 - 7.62 Thousands/µL    Absolute Immature Grans 0.05 0.00 - 0.20 Thousand/uL    Absolute Lymphocytes 2.53 0.60 - 4.47 Thousands/µL    Absolute Monocytes 0.64 0.17 - 1.22 Thousand/µL    Eosinophils Absolute 0.30 0.00 - 0.61 Thousand/µL    Basophils Absolute 0.06 0.00 - 0.10 Thousands/µL   Phosphorus   Result Value Ref Range    Phosphorus 3.1 2.3 - 4.1 mg/dL   PTH, intact   Result Value Ref Range    PTH 55.1 12.0 - 88.0 pg/mL   Uric acid    Result Value Ref Range    Uric Acid 8.5 3.5 - 8.5 mg/dL   Urinalysis with microscopic   Result Value Ref Range    Color, UA Light Yellow     Clarity, UA Clear     Specific Gravity, UA 1.028 1.003 - 1.030    pH, UA 5.0 4.5, 5.0, 5.5, 6.0, 6.5, 7.0, 7.5, 8.0    Leukocytes, UA (A) Negative     Elevated glucose may cause decreased leukocyte values. See urine microscopic for UWBC result    Nitrite, UA Negative Negative    Protein, UA Trace (A) Negative mg/dl    Glucose, UA >=1000 (1%) (A) Negative mg/dl    Ketones, UA Negative Negative mg/dl    Urobilinogen, UA <2.0 <2.0 mg/dl mg/dl    Bilirubin, UA Negative Negative    Occult Blood, UA Negative Negative    RBC, UA None Seen None Seen, 1-2 /hpf    WBC, UA 1-2 None Seen, 1-2 /hpf    Epithelial Cells None Seen None Seen, Occasional /hpf    Bacteria, UA Occasional None Seen, Occasional /hpf   Vitamin D 25 hydroxy   Result Value Ref Range    Vit D, 25-Hydroxy 19.7 (L) 30.0 - 100.0 ng/mL   Albumin / creatinine urine ratio   Result Value Ref Range    Creatinine, Ur 59.8 Reference range not established. mg/dL    Albumin,U,Random 176.4 (H) <20.0 mg/L    Albumin Creat Ratio 295 (H) 0 - 30 mg/g creatinine       ASSESSMENT and PLAN:  Blair was seen today for chronic kidney disease and consult.    Diagnoses and all orders for this visit:    Stage 3b chronic kidney disease (HCC)  -     Ambulatory Referral to Nephrology  -     CBC and differential; Future  -     Basic metabolic panel; Future  -     UA (URINE) with reflex to Scope; Future  -     Albumin / creatinine urine ratio; Future  -     Phosphorus; Future  -     Uric acid; Future  -     PTH, intact; Future  -     Cancel: Vitamin D 25 hydroxy; Future  -     US kidney and bladder with pvr; Future  -     Vitamin D 25 hydroxy; Future    Hypertensive kidney disease with stage 3 chronic kidney disease, unspecified whether stage 3a or 3b CKD (HCC)  -     Basic metabolic panel; Future  -     UA (URINE) with reflex to Scope; Future  -      Albumin / creatinine urine ratio; Future    Type 2 diabetes mellitus with stage 3 chronic kidney disease, with long-term current use of insulin, unspecified whether stage 3a or 3b CKD (HCC)  -     Basic metabolic panel; Future  -     UA (URINE) with reflex to Scope; Future  -     Albumin / creatinine urine ratio; Future    Other proteinuria  -     Basic metabolic panel; Future  -     UA (URINE) with reflex to Scope; Future  -     Albumin / creatinine urine ratio; Future    Vitamin D deficiency  -     Cancel: Vitamin D 25 hydroxy; Future  -     Cholecalciferol 50 MCG (2000 UT) TABS; Take 1 tablet (2,000 Units total) by mouth daily      1.  CKD stage III.  Multifactorial and suspected due to underlying diabetic nephropathy on top of hypertensive nephrosclerosis.    Upon review of old medical record from [facility] chart review, renal function seems to be fluctuating and I think previously known baseline creatinine was around 1.5-1.7 but current creatinine is 1.97 with EGFR of 34.  Blood glucose level on recent blood work was 528 and discussed in length with patient regarding better control of diabetes.    Clinically patient is not in volume overload state and advised patient to drink around 2 L of fluid on daily basis to ensure staying well-hydrated.    Patient has a history of kidney stone and according to patient many years back he had a kidney stone removed.  Patient had underwent CT scan of abdomen on 8/30/2021 showed 4.5 cm right-sided renal cyst.  Patient also have enlarged prostate and currently taking Flomax and denies any urinary complaint.  Plan to check renal ultrasound to rule out echogenicity and also rule out hydronephrosis/urinary retention    Management of diabetes and hypertension as mentioned below    Plan to avoid NSAIDs and recheck renal function before next visit.    2.  Hypertension in chronic kidney disease.  Currently blood pressure is under well control and monitor hypertension with lisinopril 40  "mg p.o. daily.  Encourage patient to follow low-salt diet.    3.  Diabetes type 2 in chronic kidney disease.  Goal HbA1c would be less than 7% for underlying chronic kidney disease [last known HbA1c was 8.1%-3/19/2024 but on recent BMP patient was found to have blood glucose level of 528].  Patient currently does not check blood glucose level at home and advised patient to check blood glucose level at home on regular basis and contact PCP if diabetes number remains above the goal.  Currently patient is taking insulin.    4.  Proteinuria.  Multifactorial and suspected due to underlying diabetic nephropathy on top of hypertension nephrosclerosis.  Current urine microalbumin to creatinine ratio is 295 mg.  Currently on lisinopril 40 mg p.o. daily.  Encourage patient to ensure diabetes and hypertension stays under good control.  Plan to recheck proteinuria with the next visit.    5.  Vitamin D deficiency.  Found to have low vitamin D level of 19 which is below the goal and plan to start patient on cholecalciferol 2000 units p.o. daily and recheck vitamin D level with the next visit.    Return to nephrology office in 3 months.  Future labs + renal ultrasound ordered        Portions of the record may have been created with voice recognition software. Occasional wrong word or \"sound a like\" substitutions may have occurred due to the inherent limitations of voice recognition software. Read the chart carefully and recognize, using context, where substitutions have occurred.If you have any questions, please contact the dictating provider.   "

## 2024-04-17 ENCOUNTER — APPOINTMENT (EMERGENCY)
Dept: RADIOLOGY | Facility: HOSPITAL | Age: 64
End: 2024-04-17
Payer: COMMERCIAL

## 2024-04-17 ENCOUNTER — HOSPITAL ENCOUNTER (OUTPATIENT)
Facility: HOSPITAL | Age: 64
Setting detail: OBSERVATION
Discharge: HOME | End: 2024-04-19
Attending: EMERGENCY MEDICINE | Admitting: STUDENT IN AN ORGANIZED HEALTH CARE EDUCATION/TRAINING PROGRAM
Payer: COMMERCIAL

## 2024-04-17 DIAGNOSIS — R07.9 CHEST PAIN: ICD-10-CM

## 2024-04-17 DIAGNOSIS — J18.9 PNEUMONIA: ICD-10-CM

## 2024-04-17 DIAGNOSIS — R79.89 ELEVATED TROPONIN: ICD-10-CM

## 2024-04-17 DIAGNOSIS — B34.9 VIRAL SYNDROME: Primary | ICD-10-CM

## 2024-04-17 PROBLEM — R06.02 SOB (SHORTNESS OF BREATH): Status: ACTIVE | Noted: 2024-04-17

## 2024-04-17 PROBLEM — E87.1 HYPONATREMIA: Status: ACTIVE | Noted: 2024-04-17

## 2024-04-17 LAB
2HR DELTA HS TROPONIN: -34 NG/L
ALBUMIN SERPL BCP-MCNC: 3.9 G/DL (ref 3.5–5)
ALP SERPL-CCNC: 61 U/L (ref 34–104)
ALT SERPL W P-5'-P-CCNC: 13 U/L (ref 7–52)
ANION GAP SERPL CALCULATED.3IONS-SCNC: 10 MMOL/L (ref 4–13)
AST SERPL W P-5'-P-CCNC: 11 U/L (ref 13–39)
ATRIAL RATE: 98 BPM
BASE EX.OXY STD BLDV CALC-SCNC: 85.4 % (ref 60–80)
BASE EXCESS BLDV CALC-SCNC: -7.8 MMOL/L
BASOPHILS # BLD AUTO: 0.02 THOUSANDS/ÂΜL (ref 0–0.1)
BASOPHILS NFR BLD AUTO: 0 % (ref 0–1)
BILIRUB SERPL-MCNC: 0.72 MG/DL (ref 0.2–1)
BNP SERPL-MCNC: 23 PG/ML (ref 0–100)
BUN SERPL-MCNC: 31 MG/DL (ref 5–25)
CALCIUM SERPL-MCNC: 8.7 MG/DL (ref 8.4–10.2)
CARDIAC TROPONIN I PNL SERPL HS: 117 NG/L
CARDIAC TROPONIN I PNL SERPL HS: 83 NG/L
CHLORIDE SERPL-SCNC: 107 MMOL/L (ref 96–108)
CO2 SERPL-SCNC: 15 MMOL/L (ref 21–32)
CREAT SERPL-MCNC: 1.84 MG/DL (ref 0.6–1.3)
D DIMER PPP FEU-MCNC: 0.57 UG/ML FEU
EOSINOPHIL # BLD AUTO: 0.07 THOUSAND/ÂΜL (ref 0–0.61)
EOSINOPHIL NFR BLD AUTO: 1 % (ref 0–6)
ERYTHROCYTE [DISTWIDTH] IN BLOOD BY AUTOMATED COUNT: 12.5 % (ref 11.6–15.1)
FLUAV RNA RESP QL NAA+PROBE: NEGATIVE
FLUBV RNA RESP QL NAA+PROBE: NEGATIVE
GFR SERPL CREATININE-BSD FRML MDRD: 37 ML/MIN/1.73SQ M
GLUCOSE SERPL-MCNC: 281 MG/DL (ref 65–140)
GLUCOSE SERPL-MCNC: 292 MG/DL (ref 65–140)
GLUCOSE SERPL-MCNC: 339 MG/DL (ref 65–140)
HCO3 BLDV-SCNC: 15.1 MMOL/L (ref 24–30)
HCT VFR BLD AUTO: 42.1 % (ref 36.5–49.3)
HGB BLD-MCNC: 14.9 G/DL (ref 12–17)
IMM GRANULOCYTES # BLD AUTO: 0.03 THOUSAND/UL (ref 0–0.2)
IMM GRANULOCYTES NFR BLD AUTO: 0 % (ref 0–2)
LACTATE SERPL-SCNC: 1.1 MMOL/L (ref 0.5–2)
LYMPHOCYTES # BLD AUTO: 1.11 THOUSANDS/ÂΜL (ref 0.6–4.47)
LYMPHOCYTES NFR BLD AUTO: 12 % (ref 14–44)
MCH RBC QN AUTO: 28.4 PG (ref 26.8–34.3)
MCHC RBC AUTO-ENTMCNC: 35.4 G/DL (ref 31.4–37.4)
MCV RBC AUTO: 80 FL (ref 82–98)
MONOCYTES # BLD AUTO: 0.91 THOUSAND/ÂΜL (ref 0.17–1.22)
MONOCYTES NFR BLD AUTO: 10 % (ref 4–12)
NEUTROPHILS # BLD AUTO: 6.82 THOUSANDS/ÂΜL (ref 1.85–7.62)
NEUTS SEG NFR BLD AUTO: 77 % (ref 43–75)
NRBC BLD AUTO-RTO: 0 /100 WBCS
O2 CT BLDV-SCNC: 19.2 ML/DL
P AXIS: 61 DEGREES
PCO2 BLDV: 25.8 MM HG (ref 42–50)
PH BLDV: 7.39 [PH] (ref 7.3–7.4)
PLATELET # BLD AUTO: 249 THOUSANDS/UL (ref 149–390)
PMV BLD AUTO: 9.4 FL (ref 8.9–12.7)
PO2 BLDV: 50.2 MM HG (ref 35–45)
POTASSIUM SERPL-SCNC: 4.3 MMOL/L (ref 3.5–5.3)
PR INTERVAL: 156 MS
PROCALCITONIN SERPL-MCNC: 0.2 NG/ML
PROT SERPL-MCNC: 7.1 G/DL (ref 6.4–8.4)
QRS AXIS: 44 DEGREES
QRSD INTERVAL: 96 MS
QT INTERVAL: 352 MS
QTC INTERVAL: 449 MS
RBC # BLD AUTO: 5.25 MILLION/UL (ref 3.88–5.62)
RSV RNA RESP QL NAA+PROBE: NEGATIVE
SARS-COV-2 RNA RESP QL NAA+PROBE: NEGATIVE
SODIUM SERPL-SCNC: 132 MMOL/L (ref 135–147)
T WAVE AXIS: 59 DEGREES
VENTRICULAR RATE: 98 BPM
WBC # BLD AUTO: 8.96 THOUSAND/UL (ref 4.31–10.16)

## 2024-04-17 PROCEDURE — 93010 ELECTROCARDIOGRAM REPORT: CPT | Performed by: INTERNAL MEDICINE

## 2024-04-17 PROCEDURE — 84484 ASSAY OF TROPONIN QUANT: CPT | Performed by: PHYSICIAN ASSISTANT

## 2024-04-17 PROCEDURE — 87449 NOS EACH ORGANISM AG IA: CPT | Performed by: STUDENT IN AN ORGANIZED HEALTH CARE EDUCATION/TRAINING PROGRAM

## 2024-04-17 PROCEDURE — 93005 ELECTROCARDIOGRAM TRACING: CPT

## 2024-04-17 PROCEDURE — 80053 COMPREHEN METABOLIC PANEL: CPT | Performed by: PHYSICIAN ASSISTANT

## 2024-04-17 PROCEDURE — 85025 COMPLETE CBC W/AUTO DIFF WBC: CPT | Performed by: PHYSICIAN ASSISTANT

## 2024-04-17 PROCEDURE — 84145 PROCALCITONIN (PCT): CPT | Performed by: STUDENT IN AN ORGANIZED HEALTH CARE EDUCATION/TRAINING PROGRAM

## 2024-04-17 PROCEDURE — 99223 1ST HOSP IP/OBS HIGH 75: CPT | Performed by: STUDENT IN AN ORGANIZED HEALTH CARE EDUCATION/TRAINING PROGRAM

## 2024-04-17 PROCEDURE — 99285 EMERGENCY DEPT VISIT HI MDM: CPT

## 2024-04-17 PROCEDURE — 83880 ASSAY OF NATRIURETIC PEPTIDE: CPT | Performed by: STUDENT IN AN ORGANIZED HEALTH CARE EDUCATION/TRAINING PROGRAM

## 2024-04-17 PROCEDURE — 85379 FIBRIN DEGRADATION QUANT: CPT | Performed by: PHYSICIAN ASSISTANT

## 2024-04-17 PROCEDURE — 82805 BLOOD GASES W/O2 SATURATION: CPT | Performed by: STUDENT IN AN ORGANIZED HEALTH CARE EDUCATION/TRAINING PROGRAM

## 2024-04-17 PROCEDURE — 36415 COLL VENOUS BLD VENIPUNCTURE: CPT | Performed by: PHYSICIAN ASSISTANT

## 2024-04-17 PROCEDURE — 82948 REAGENT STRIP/BLOOD GLUCOSE: CPT

## 2024-04-17 PROCEDURE — 71046 X-RAY EXAM CHEST 2 VIEWS: CPT

## 2024-04-17 PROCEDURE — 83605 ASSAY OF LACTIC ACID: CPT | Performed by: STUDENT IN AN ORGANIZED HEALTH CARE EDUCATION/TRAINING PROGRAM

## 2024-04-17 PROCEDURE — 0241U HB NFCT DS VIR RESP RNA 4 TRGT: CPT | Performed by: EMERGENCY MEDICINE

## 2024-04-17 PROCEDURE — 99285 EMERGENCY DEPT VISIT HI MDM: CPT | Performed by: PHYSICIAN ASSISTANT

## 2024-04-17 RX ORDER — HEPARIN SODIUM 5000 [USP'U]/ML
5000 INJECTION, SOLUTION INTRAVENOUS; SUBCUTANEOUS EVERY 8 HOURS SCHEDULED
Status: DISCONTINUED | OUTPATIENT
Start: 2024-04-17 | End: 2024-04-19 | Stop reason: HOSPADM

## 2024-04-17 RX ORDER — LANOLIN ALCOHOL/MO/W.PET/CERES
6 CREAM (GRAM) TOPICAL
Status: DISCONTINUED | OUTPATIENT
Start: 2024-04-17 | End: 2024-04-19 | Stop reason: HOSPADM

## 2024-04-17 RX ORDER — LIDOCAINE 50 MG/G
2 PATCH TOPICAL DAILY
Status: DISCONTINUED | OUTPATIENT
Start: 2024-04-18 | End: 2024-04-19 | Stop reason: HOSPADM

## 2024-04-17 RX ORDER — ACETAMINOPHEN 325 MG/1
650 TABLET ORAL EVERY 6 HOURS PRN
Status: DISCONTINUED | OUTPATIENT
Start: 2024-04-17 | End: 2024-04-19 | Stop reason: HOSPADM

## 2024-04-17 RX ORDER — AZITHROMYCIN 500 MG/1
500 TABLET, FILM COATED ORAL EVERY 24 HOURS
Status: DISCONTINUED | OUTPATIENT
Start: 2024-04-17 | End: 2024-04-19 | Stop reason: HOSPADM

## 2024-04-17 RX ORDER — NITROGLYCERIN 0.4 MG/1
0.4 TABLET SUBLINGUAL
Status: DISCONTINUED | OUTPATIENT
Start: 2024-04-17 | End: 2024-04-19 | Stop reason: HOSPADM

## 2024-04-17 RX ORDER — CLOPIDOGREL BISULFATE 75 MG/1
75 TABLET ORAL DAILY
Status: DISCONTINUED | OUTPATIENT
Start: 2024-04-18 | End: 2024-04-19 | Stop reason: HOSPADM

## 2024-04-17 RX ORDER — INSULIN GLARGINE 100 [IU]/ML
50 INJECTION, SOLUTION SUBCUTANEOUS EVERY MORNING
Status: DISCONTINUED | OUTPATIENT
Start: 2024-04-18 | End: 2024-04-19 | Stop reason: HOSPADM

## 2024-04-17 RX ORDER — GUAIFENESIN 600 MG/1
600 TABLET, EXTENDED RELEASE ORAL 2 TIMES DAILY
Status: DISCONTINUED | OUTPATIENT
Start: 2024-04-17 | End: 2024-04-19 | Stop reason: HOSPADM

## 2024-04-17 RX ORDER — TAMSULOSIN HYDROCHLORIDE 0.4 MG/1
0.4 CAPSULE ORAL
Status: DISCONTINUED | OUTPATIENT
Start: 2024-04-17 | End: 2024-04-19 | Stop reason: HOSPADM

## 2024-04-17 RX ORDER — ATORVASTATIN CALCIUM 10 MG/1
10 TABLET, FILM COATED ORAL DAILY
Status: DISCONTINUED | OUTPATIENT
Start: 2024-04-18 | End: 2024-04-19 | Stop reason: HOSPADM

## 2024-04-17 RX ORDER — BENZONATATE 100 MG/1
100 CAPSULE ORAL 3 TIMES DAILY PRN
Status: DISCONTINUED | OUTPATIENT
Start: 2024-04-17 | End: 2024-04-19 | Stop reason: HOSPADM

## 2024-04-17 RX ORDER — FLUTICASONE PROPIONATE 50 MCG
1 SPRAY, SUSPENSION (ML) NASAL DAILY
Status: DISCONTINUED | OUTPATIENT
Start: 2024-04-18 | End: 2024-04-19 | Stop reason: HOSPADM

## 2024-04-17 RX ORDER — INSULIN LISPRO 100 [IU]/ML
1-5 INJECTION, SOLUTION INTRAVENOUS; SUBCUTANEOUS
Status: DISCONTINUED | OUTPATIENT
Start: 2024-04-17 | End: 2024-04-19 | Stop reason: HOSPADM

## 2024-04-17 RX ORDER — SODIUM CHLORIDE, SODIUM GLUCONATE, SODIUM ACETATE, POTASSIUM CHLORIDE, MAGNESIUM CHLORIDE, SODIUM PHOSPHATE, DIBASIC, AND POTASSIUM PHOSPHATE .53; .5; .37; .037; .03; .012; .00082 G/100ML; G/100ML; G/100ML; G/100ML; G/100ML; G/100ML; G/100ML
75 INJECTION, SOLUTION INTRAVENOUS CONTINUOUS
Status: DISCONTINUED | OUTPATIENT
Start: 2024-04-17 | End: 2024-04-18

## 2024-04-17 RX ADMIN — ACETAMINOPHEN 650 MG: 325 TABLET, FILM COATED ORAL at 20:21

## 2024-04-17 RX ADMIN — TAMSULOSIN HYDROCHLORIDE 0.4 MG: 0.4 CAPSULE ORAL at 18:53

## 2024-04-17 RX ADMIN — AZITHROMYCIN 500 MG: 500 TABLET, FILM COATED ORAL at 22:11

## 2024-04-17 RX ADMIN — INSULIN LISPRO 2 UNITS: 100 INJECTION, SOLUTION INTRAVENOUS; SUBCUTANEOUS at 20:23

## 2024-04-17 RX ADMIN — HEPARIN SODIUM 5000 UNITS: 5000 INJECTION INTRAVENOUS; SUBCUTANEOUS at 22:21

## 2024-04-17 RX ADMIN — BENZONATATE 100 MG: 100 CAPSULE ORAL at 22:11

## 2024-04-17 RX ADMIN — INSULIN LISPRO 3 UNITS: 100 INJECTION, SOLUTION INTRAVENOUS; SUBCUTANEOUS at 23:00

## 2024-04-17 RX ADMIN — SODIUM CHLORIDE, SODIUM GLUCONATE, SODIUM ACETATE, POTASSIUM CHLORIDE, MAGNESIUM CHLORIDE, SODIUM PHOSPHATE, DIBASIC, AND POTASSIUM PHOSPHATE 75 ML/HR: .53; .5; .37; .037; .03; .012; .00082 INJECTION, SOLUTION INTRAVENOUS at 18:53

## 2024-04-17 RX ADMIN — CEFTRIAXONE SODIUM 1000 MG: 10 INJECTION, POWDER, FOR SOLUTION INTRAVENOUS at 20:21

## 2024-04-17 RX ADMIN — Medication 6 MG: at 22:11

## 2024-04-17 RX ADMIN — GUAIFENESIN 600 MG: 600 TABLET ORAL at 18:53

## 2024-04-17 NOTE — PLAN OF CARE
Problem: PAIN - ADULT  Goal: Verbalizes/displays adequate comfort level or baseline comfort level  Description: Interventions:  - Encourage patient to monitor pain and request assistance  - Assess pain using appropriate pain scale  - Administer analgesics based on type and severity of pain and evaluate response  - Implement non-pharmacological measures as appropriate and evaluate response  - Consider cultural and social influences on pain and pain management  - Notify physician/advanced practitioner if interventions unsuccessful or patient reports new pain  Outcome: Progressing     Problem: INFECTION - ADULT  Goal: Absence or prevention of progression during hospitalization  Description: INTERVENTIONS:  - Assess and monitor for signs and symptoms of infection  - Monitor lab/diagnostic results  - Monitor all insertion sites, i.e. indwelling lines, tubes, and drains  - Monitor endotracheal if appropriate and nasal secretions for changes in amount and color  - [place] appropriate cooling/warming therapies per order  - Administer medications as ordered  - Instruct and encourage patient and family to use good hand hygiene technique  - Identify and instruct in appropriate isolation precautions for identified infection/condition  Outcome: Progressing  Goal: Absence of fever/infection during neutropenic period  Description: INTERVENTIONS:  - Monitor WBC    Outcome: Progressing     Problem: SAFETY ADULT  Goal: Maintain or return to baseline ADL function  Description: INTERVENTIONS:  -  Assess patient's ability to carry out ADLs; assess patient's baseline for ADL function and identify physical deficits which impact ability to perform ADLs (bathing, care of mouth/teeth, toileting, grooming, dressing, etc.)  - Assess/evaluate cause of self-care deficits   - Assess range of motion  - Assess patient's mobility; develop plan if impaired  - Assess patient's need for assistive devices and provide as appropriate  - Encourage  maximum independence but intervene and supervise when necessary  - Involve family in performance of ADLs  - Assess for home care needs following discharge   - Consider OT consult to assist with ADL evaluation and planning for discharge  - Provide patient education as appropriate  Outcome: Progressing     Problem: DISCHARGE PLANNING  Goal: Discharge to home or other facility with appropriate resources  Description: INTERVENTIONS:  - Identify barriers to discharge w/patient and caregiver  - Arrange for needed discharge resources and transportation as appropriate  - Identify discharge learning needs (meds, wound care, etc.)  - Arrange for interpretive services to assist at discharge as needed  - Refer to Case Management Department for coordinating discharge planning if the patient needs post-hospital services based on physician/advanced practitioner order or complex needs related to functional status, cognitive ability, or social support system  Outcome: Progressing     Problem: Knowledge Deficit  Goal: Patient/family/caregiver demonstrates understanding of disease process, treatment plan, medications, and discharge instructions  Description: Complete learning assessment and assess knowledge base.  Interventions:  - Provide teaching at level of understanding  - Provide teaching via preferred learning methods  Outcome: Progressing

## 2024-04-17 NOTE — ASSESSMENT & PLAN NOTE
Blood pressure stable  Creatinine slightly above baseline, Will hold home dose lisinopril 40 mg daily for now  Monitor vitals closely

## 2024-04-17 NOTE — ASSESSMENT & PLAN NOTE
Sodium 132, suspect secondary to poor p.o. intake over the past few days.  Continue with gentle IV fluids  Monitor with a.m. BMP

## 2024-04-17 NOTE — ASSESSMENT & PLAN NOTE
History of CAD status post PCI on Plavix 75 mg daily, Lipitor 10 mg daily  Patient denies chest pain or palpitations at this time

## 2024-04-17 NOTE — ASSESSMENT & PLAN NOTE
"Lab Results   Component Value Date    HGBA1C 8.1 (H) 03/19/2024       No results for input(s): \"POCGLU\" in the last 72 hours.    Blood Sugar Average: Last 72 hrs:      "

## 2024-04-17 NOTE — ASSESSMENT & PLAN NOTE
"Lab Results   Component Value Date    HGBA1C 8.1 (H) 03/19/2024       No results for input(s): \"POCGLU\" in the last 72 hours.    Blood Sugar Average: Last 72 hrs:  Continue with home insulin regimen  Sliding scale insulin with point-of-care glucose check    "

## 2024-04-17 NOTE — ASSESSMENT & PLAN NOTE
Lab Results   Component Value Date    EGFR 37 04/17/2024    EGFR 34 04/08/2024    EGFR 48 02/12/2024    CREATININE 1.84 (H) 04/17/2024    CREATININE 1.97 (H) 04/08/2024    CREATININE 1.49 (H) 02/12/2024   Baseline creatinine 1.5-1.7, following with nephrology outpatient.  Fattening 1.84, slightly above baseline.  Appears dehydrated on exam, continue with gentle IV fluids  Holding lisinopril, if creatinine stable resume tomorrow

## 2024-04-17 NOTE — ASSESSMENT & PLAN NOTE
Troponin 117, peak troponin 82.  EKG with some ST depressions in lateral leads noted.  Patient denies any chest pain or palpitations.  Does report shortness of breath, with ongoing productive cough, congestion, fevers and chills.  Given EKG finding and hx of PCI, will order echocardiogram  ESR/CRP to evaluate for [place]/pericarditis   Telemetry  As needed nitroglycerin for chest pain

## 2024-04-17 NOTE — ED PROVIDER NOTES
History  Chief Complaint   Patient presents with    Flu Symptoms     Pt arrived ambulatory c/o cough, chills, body aches, congestion and lightheadedness x 3-4 days ago -n/v -cp     This is a 63-year-old male patient with history of hypertension hyperlipidemia CAD prior MI, diabetes.  Comes to the emergency room for about 5 days of cough congestion sore throat.  Nasal congestion.  Productive cough.  Thick sputum.  No hemoptysis.  He states he is short of breath but when he elaborates he is saying he is congested through his nose and has difficulty breathing through his nose.  He denies chest pain but states his right ribs hurt from coughing.  He had a fever of 101 earlier this week but none since that time.  He presents today because he thought by now he be better.  Denies any leg pain or swelling.  No recent travels traumas or surgeries.      History provided by:  Patient   used: No    Flu Symptoms  Presenting symptoms: cough, fever, myalgias, shortness of breath and sore throat    Presenting symptoms: no vomiting    Associated symptoms: chills and nasal congestion    Associated symptoms: no ear pain        Prior to Admission Medications   Prescriptions Last Dose Informant Patient Reported? Taking?   Blood Glucose Monitoring Suppl (Contour Next One) KIT  Self No No   Sig: by Device route 2 (two) times a day May substitute brand based on insurance coverage. Check glucose ACHS.   Blood Glucose Monitoring Suppl (OneTouch Verio Reflect) w/Device KIT  Self No No   Sig: Check blood sugars four times daily. Please substitute with appropriate alternative as covered by patient's insurance. Dx: E11.65   Cholecalciferol 50 MCG (2000 UT) TABS   No No   Sig: Take 1 tablet (2,000 Units total) by mouth daily   Insulin Pen Needle (Advocate Insulin Pen Needles) 31G X 8 MM MISC  Self No No   Sig: Use 4 (four) times a day   OneTouch Delica Lancets 33G MISC  Self No No   Sig: Check blood sugars four times daily.  Please substitute with appropriate alternative as covered by patient's insurance. Dx: E11.65   atorvastatin (LIPITOR) 10 mg tablet  Self No No   Sig: Take 1 tablet (10 mg total) by mouth daily   clopidogrel (PLAVIX) 75 mg tablet  Self No No   Sig: Take 1 tablet (75 mg total) by mouth daily   glucose blood (Contour Next Test) test strip  Self No No   Sig: Check sugar twice daily   glucose blood (OneTouch Verio) test strip  Self No No   Sig: Check blood sugars four times daily. Please substitute with appropriate alternative as covered by patient's insurance. Dx: E11.65   insulin aspart (NovoLOG FlexPen) 100 UNIT/ML injection pen  Self No No   Sig: Sliding Scale: 0-149=0 units, 150-199=2 units, 200-249=4 units, 250-299=6 units, 300-349=8 units, 350-399=10 units, >400=Call office   insulin degludec (Tresiba FlexTouch) 100 units/mL injection pen  Self No No   Sig: Inject 100 Units under the skin daily   lisinopril (ZESTRIL) 40 mg tablet  Self No No   Sig: Take 1 tablet (40 mg total) by mouth daily   nitroglycerin (NITROSTAT) 0.4 mg SL tablet   No No   Sig: Place 1 tablet (0.4 mg total) under the tongue every 5 (five) minutes as needed for chest pain for up to 10 days   Patient not taking: Reported on 4/9/2024   tamsulosin (FLOMAX) 0.4 mg  Self Yes No   Sig: Take 0.4 mg by mouth      Facility-Administered Medications: None       Past Medical History:   Diagnosis Date    Chronic kidney disease     Diabetes mellitus (HCC)     Enlarged prostate     Hypertension     Kidney stone     Kidney stones     Myocardial infarction (HCC)        Past Surgical History:   Procedure Laterality Date    HAND SURGERY      TONSILLECTOMY         Family History   Problem Relation Age of Onset    Lung cancer Father      I have reviewed and agree with the history as documented.    E-Cigarette/Vaping    E-Cigarette Use Never User      E-Cigarette/Vaping Substances     Social History     Tobacco Use    Smoking status: Never     Passive exposure:  Never    Smokeless tobacco: Never   Vaping Use    Vaping status: Never Used   Substance Use Topics    Alcohol use: Yes    Drug use: Never       Review of Systems   Constitutional:  Positive for chills and fever.   HENT:  Positive for congestion and sore throat. Negative for ear pain.    Eyes:  Negative for pain and visual disturbance.   Respiratory:  Positive for cough and shortness of breath.    Cardiovascular:  Negative for chest pain and palpitations.   Gastrointestinal:  Negative for abdominal pain and vomiting.   Genitourinary:  Negative for dysuria and hematuria.   Musculoskeletal:  Positive for myalgias. Negative for arthralgias and back pain.   Skin:  Negative for color change and rash.   Neurological:  Negative for seizures and syncope.   All other systems reviewed and are negative.      Physical Exam  Physical Exam  Vitals and nursing note reviewed.   Constitutional:       General: He is not in acute distress.     Appearance: He is well-developed.   HENT:      Head: Normocephalic and atraumatic.   Eyes:      Conjunctiva/sclera: Conjunctivae normal.   Cardiovascular:      Rate and Rhythm: Normal rate and regular rhythm.      Heart sounds: No murmur heard.  Pulmonary:      Effort: Pulmonary effort is normal. No respiratory distress.      Breath sounds: Normal breath sounds.   Abdominal:      Palpations: Abdomen is soft.      Tenderness: There is no abdominal tenderness.   Musculoskeletal:         General: No swelling.      Cervical back: Neck supple.   Skin:     General: Skin is warm and dry.      Capillary Refill: Capillary refill takes less than 2 seconds.   Neurological:      Mental Status: He is alert.   Psychiatric:         Mood and Affect: Mood normal.         Vital Signs  ED Triage Vitals [04/17/24 1426]   Temperature Pulse Respirations Blood Pressure SpO2   (!) 97.3 °F (36.3 °C) (!) 107 22 159/99 95 %      Temp Source Heart Rate Source Patient Position - Orthostatic VS BP Location FiO2 (%)   Temporal  Monitor Sitting Left arm --      Pain Score       --           Vitals:    04/17/24 1426 04/17/24 1500   BP: 159/99 151/93   Pulse: (!) 107 99   Patient Position - Orthostatic VS: Sitting Lying         Visual Acuity      ED Medications  Medications - No data to display    Diagnostic Studies  Results Reviewed       Procedure Component Value Units Date/Time    Procalcitonin [[id number]] Collected: 04/17/24 1510    Lab Status: In process Specimen: Blood from Arm, Right Updated: 04/17/24 1658    B-Type Natriuretic Peptide(BNP) [[id number]] Collected: 04/17/24 1510    Lab Status: In process Specimen: Blood from Arm, Right Updated: 04/17/24 1657    Blood gas, venous [[id number]]     Lab Status: No result Specimen: Blood     Lactic acid, plasma (w/reflex if result > 2.0) [[id number]]     Lab Status: No result Specimen: Blood     Sedimentation rate, automated [[id number]]     Lab Status: No result Specimen: Blood     C-reactive protein [[id number]]     Lab Status: No result Specimen: Blood     D-dimer, quantitative [[id number]]  (Abnormal) Collected: 04/17/24 1550    Lab Status: Final result Specimen: Blood from Arm, Right Updated: 04/17/24 1625     D-Dimer, Quant 0.57 ug/ml FEU     Narrative:      In the evaluation for possible pulmonary embolism, in the appropriate (Well's Score of 4 or less) patient, the age adjusted d-dimer cutoff for this patient can be calculated as:    Age x 0.01 (in ug/mL) for Age-adjusted D-dimer exclusion threshold for a patient over 50 years.    HS Troponin I 4hr [[id number]]     Lab Status: No result Specimen: Blood     HS Troponin I 2hr [[id number]]     Lab Status: No result Specimen: Blood     HS Troponin 0hr (reflex protocol) [[id number]]  (Abnormal) Collected: 04/17/24 1510    Lab Status: Final result Specimen: Blood from Arm, Right Updated: 04/17/24 1543     hs TnI 0hr 117 ng/L     Comprehensive metabolic panel [[id number]]  (Abnormal) Collected: 04/17/24 1510    Lab Status: Final result Specimen:  Blood from Arm, Right Updated: 04/17/24 1536     Sodium 132 mmol/L      Potassium 4.3 mmol/L      Chloride 107 mmol/L      CO2 15 mmol/L      ANION GAP 10 mmol/L      BUN 31 mg/dL      Creatinine 1.84 mg/dL      Glucose 281 mg/dL      Calcium 8.7 mg/dL      AST 11 U/L      ALT 13 U/L      Alkaline Phosphatase 61 U/L      Total Protein 7.1 g/dL      Albumin 3.9 g/dL      Total Bilirubin 0.72 mg/dL      eGFR 37 ml/min/1.73sq m     Narrative:      National Kidney Disease Foundation guidelines for Chronic Kidney Disease (CKD):     Stage 1 with normal or high GFR (GFR > 90 mL/min/1.73 square meters)    Stage 2 Mild CKD (GFR = 60-89 mL/min/1.73 square meters)    Stage 3A Moderate CKD (GFR = 45-59 mL/min/1.73 square meters)    Stage 3B Moderate CKD (GFR = 30-44 mL/min/1.73 square meters)    Stage 4 Severe CKD (GFR = 15-29 mL/min/1.73 square meters)    Stage 5 End Stage CKD (GFR <15 mL/min/1.73 square meters)  Note: GFR calculation is accurate only with a steady state creatinine    FLU/RSV/COVID - if FLU/RSV clinically relevant [[id number]]  (Normal) Collected: 04/17/24 1439    Lab Status: Final result Specimen: Nares from Nose Updated: 04/17/24 1522     SARS-CoV-2 Negative     INFLUENZA A PCR Negative     INFLUENZA B PCR Negative     RSV PCR Negative    Narrative:      FOR PEDIATRIC PATIENTS - copy/paste COVID Guidelines URL to browser: [email or web address]    SARS-CoV-2 assay is a Nucleic Acid Amplification assay intended for the  qualitative detection of nucleic acid from SARS-CoV-2 in nasopharyngeal  swabs. Results are for the presumptive identification of SARS-CoV-2 RNA.    Positive results are indicative of infection with SARS-CoV-2, the virus  causing COVID-19, but do not rule out bacterial infection or co-infection  with other viruses. Laboratories within the [place] and its  territories are required to report all positive results to the appropriate  public  health authorities. Negative results do not preclude SARS-CoV-2  infection and should not be used as the sole basis for treatment or other  patient management decisions. Negative results must be combined with  clinical observations, patient history, and epidemiological information.  This test has not been FDA cleared or approved.    This test has been authorized by FDA under an Emergency Use Authorization  (EUA). This test is only authorized for the duration of time the  declaration that circumstances exist justifying the authorization of the  emergency use of an in vitro diagnostic tests for detection of SARS-CoV-2  virus and/or diagnosis of COVID-19 infection under section 564(b)(1) of  the Act, 21 U.S.C. 360bbb-3(b)(1), unless the authorization is terminated  or revoked sooner. The test has been validated but independent review by FDA  and CLIA is pending.    Test performed using [employer] GeneXpert: This RT-PCR assay targets N2,  a region unique to SARS-CoV-2. A conserved region in the E-gene was chosen  for pan-Sarbecovirus detection which includes SARS-CoV-2.    According to CMS-2020-01-R, this platform meets the definition of high-throughput technology.    CBC and differential [[id number]]  (Abnormal) Collected: 04/17/24 1510    Lab Status: Final result Specimen: Blood from Arm, Right Updated: 04/17/24 1518     WBC 8.96 Thousand/uL      RBC 5.25 Million/uL      Hemoglobin 14.9 g/dL      Hematocrit 42.1 %      MCV 80 fL      MCH 28.4 pg      MCHC 35.4 g/dL      RDW 12.5 %      MPV 9.4 fL      Platelets 249 Thousands/uL      nRBC 0 /100 WBCs      Segmented % 77 %      Immature Grans % 0 %      Lymphocytes % 12 %      Monocytes % 10 %      Eosinophils Relative 1 %      Basophils Relative 0 %      Absolute Neutrophils 6.82 Thousands/µL      Absolute Immature Grans 0.03 Thousand/uL      Absolute Lymphocytes 1.11 Thousands/µL      Absolute Monocytes 0.91 Thousand/µL      Eosinophils Absolute 0.07 Thousand/µL       Basophils Absolute 0.02 Thousands/µL                    XR chest 2 views    (Results Pending)              Procedures  ECG 12 Lead Documentation Only    Date/Time: 4/17/2024 3:22 PM    Performed by: Ozzie Hassan PA-C  Authorized by: Ozize Hassan PA-C    ECG reviewed by me, the ED Provider: yes    Patient location:  ED  Interpretation:     Interpretation: normal    Quality:     Tracing quality:  Limited by artifact  Rate:     ECG rate:  98    ECG rate assessment: normal    Rhythm:     Rhythm: sinus rhythm    Ectopy:     Ectopy: none    QRS:     QRS axis:  Normal    QRS intervals:  Normal  Conduction:     Conduction: normal    ST segments:     ST segments:  Normal  T waves:     T waves: non-specific             ED Course  ED Course as of 04/17/24 1705   Wed Apr 17, 2024   1628 D-Dimer, Quant(!): 0.57  Age adjusted negative                               SBIRT 20yo+      Flowsheet Row Most Recent Value   Initial Alcohol Screen: US AUDIT-C     1. How often do you have a drink containing alcohol? 0 Filed at: 04/17/2024 1437   2. How many drinks containing alcohol do you have on a typical day you are drinking?  0 Filed at: 04/17/2024 1437   3a. Male UNDER 65: How often do you have five or more drinks on one occasion? 0 Filed at: 04/17/2024 1437   Audit-C Score 0 Filed at: 04/17/2024 1437   TRACE: How many times in the past year have you...    Used an illegal drug or used a prescription medication for non-medical reasons? Never Filed at: 04/17/2024 1437                      Medical Decision Making  DDx including but not limited to:  URI, bronchitis, pneumonia, GERD, aspiration pneumonitis, viral illness, COVID 19, smoke inhalation, adverse medication reaction.     Plan: Cardiac workup.  X-ray chest.  Dispo pending.    MDM: 62 yo with lightheadedness, cough, congestion. Trop positive. EKG nonspecific. DDimer negative (age adjusted). Labs baseline. Glucose elevated. Compliant with meds but noncompliant with diet.  Admit for continue eval. Pt in agreement. Stable at time of admission.     Amount and/or Complexity of Data Reviewed  Labs: ordered. Decision-making details documented in ED Course.  Radiology: ordered.    Risk  Decision regarding hospitalization.             Disposition  Final diagnoses:   Viral syndrome   Chest pain   Elevated troponin     Time reflects when diagnosis was documented in both MDM as applicable and the Disposition within this note       Time User Action Codes Description Comment    4/17/2024  4:58 PM Ozzie Hassan Add [B34.9] Viral syndrome     4/17/2024  4:58 PM Ozzie Hassan Add [R07.9] Chest pain     4/17/2024  4:58 PM Ozzie Hassan Add [R79.89] Elevated troponin           ED Disposition       ED Disposition   Admit    Condition   Stable    Date/Time   Wed Apr 17, 2024 2685    Comment   Case was discussed with Dr. Galdamez and the patient's admission status was agreed to be Admission Status: observation status to the service of Dr. Galdamez .               Follow-up Information    None         Patient's Medications   Discharge Prescriptions    No medications on file       No discharge procedures on file.    PDMP Review         Value Time User    PDMP Reviewed  Yes 1/5/2024  6:49 AM Sallie Ibarra PA-C            ED Provider  Electronically Signed by             Ozzie Hassan PA-C  04/17/24 6649

## 2024-04-17 NOTE — H&P
[facility]  H&P  Name: Blair Caldwell I  MRN: [id number]  Unit/Bed#: -Kaden I Date of Admission: 4/17/2024   Date of Service: 4/17/2024  Hospital Day: 0    Assessment/Plan   Hyponatremia  Assessment & Plan  Sodium 132, suspect secondary to poor p.o. intake over the past few days.  Continue with gentle IV fluids  Monitor with a.m. BMP    SOB (shortness of breath)  Assessment & Plan  Presenting with 4-day history of shortness of breath associated with subjective fever, chills, productive cough, nasal congestion.  Denies sick contacts.  Patient denying chest pain or palpitations.  X-ray with possible lower lobe consolidation, official read pending.    Flu COVID RSV negative  Hemodynamically stable on room air.  Will initiate on IV ceftriaxone for now given symptoms  Of note bicarb on BMP was 15, lactic acid and vbg pending  Continue with supportive care  Follow-up on Pro-Chuck, if negative x 2 consider discontinuation  Monitor fever curve and WBC count  Monitor respiratory status closely    Stage 3b chronic kidney disease (HCC)  Assessment & Plan  Lab Results   Component Value Date    EGFR 37 04/17/2024    EGFR 34 04/08/2024    EGFR 48 02/12/2024    CREATININE 1.84 (H) 04/17/2024    CREATININE 1.97 (H) 04/08/2024    CREATININE 1.49 (H) 02/12/2024   Baseline creatinine 1.5-1.7, following with nephrology outpatient.  Fattening 1.84, slightly above baseline.  Appears dehydrated on exam, continue with gentle IV fluids  Holding lisinopril, if creatinine stable resume tomorrow    Coronary artery disease  Assessment & Plan  History of CAD status post PCI on Plavix 75 mg daily, Lipitor 10 mg daily  Patient denies chest pain or palpitations at this time    Essential hypertension  Assessment & Plan  Blood pressure stable  Creatinine slightly above baseline, Will hold home dose lisinopril 40 mg daily for now  Monitor vitals closely    Type 2 diabetes mellitus with kidney complication, with long-term  current use of insulin (HCC)  Assessment & Plan  Lab Results   Component Value Date    HGBA1C 8.1 (H) 03/19/2024       Recent Labs     04/17/24  1848   POCGLU 292*       Blood Sugar Average: Last 72 hrs:  (P) 292reports home insulin regimen of tresiba 120 U q am and lispro 14 U qam with breafast only.   Given poor po intake will dose reduce to lantus 50 U q am and SSI, increase as tolerated  Sliding scale insulin with point-of-care glucose check      * Elevated troponin  Assessment & Plan  Troponin 117, peak troponin 82.  EKG with some ST depressions in lateral leads noted.  Patient denies any chest pain or palpitations.  Does report shortness of breath, with ongoing productive cough, congestion, fevers and chills.  Given EKG finding and hx of PCI, will order echocardiogram  ESR/CRP to evaluate for Floyd/pericarditis   Telemetry  As needed nitroglycerin for chest pain             VTE Pharmacologic Prophylaxis: VTE Score: 4 Moderate Risk (Score 3-4) - Pharmacological DVT Prophylaxis Ordered: heparin.  Code Status: Level 1 - Full Code   Discussion with family: Updated  (mother and sister) at bedside.    Anticipated Length of Stay: Patient will be admitted on an observation basis with an anticipated length of stay of less than 2 midnights secondary to elevated troponin.    Total Time Spent on Date of Encounter in care of patient: 65 mins. This time was spent on one or more of the following: performing physical exam; counseling and coordination of care; obtaining or reviewing history; documenting in the medical record; reviewing/ordering tests, medications or procedures; communicating with other healthcare professionals and discussing with patient's family/caregivers.    Chief Complaint: sob, myalgias, fevers and chills, cough    History of Present Illness:  Blair Caldwell is a 63 y.o. male with a PMH of CAD status post PCI, CKD 3, type 2 diabetes on insulin who presents with 4-day history of fevers, chills,  productive cough, nasal congestion and shortness of breath.  Denies any chest pain.  States he has been coughing so much which is why his ribs are hurting.  Denies any sick contacts.  Productive yellow sputum.  Ported fever of 101.  Generalized myalgias.  Any change in medications.  Denies any recent traveling.  Dors is compliance with current medication regimen.  Workup revealed elevated troponin.  Admitted for further management and monitoring giving history of CAD.    Review of Systems:  Review of Systems   Constitutional:  Positive for activity change, chills, fatigue and fever.   Respiratory:  Positive for cough and shortness of breath. Negative for wheezing.    Neurological:  Positive for weakness.       Past Medical and Surgical History:   Past Medical History:   Diagnosis Date    Chronic kidney disease     Diabetes mellitus (HCC)     Enlarged prostate     Hypertension     Kidney stone     Kidney stones     Myocardial infarction (HCC)        Past Surgical History:   Procedure Laterality Date    HAND SURGERY      TONSILLECTOMY         Meds/Allergies:  Prior to Admission medications    Medication Sig Start Date End Date Taking? Authorizing Provider   atorvastatin (LIPITOR) 10 mg tablet Take 1 tablet (10 mg total) by mouth daily 1/16/24   ALINA Melendez   Blood Glucose Monitoring Suppl (Contour Next One) KIT by Device route 2 (two) times a day May substitute brand based on insurance coverage. Check glucose ACHS. 2/19/24   ALINA Gresham   Blood Glucose Monitoring Suppl (OneTouch Verio Reflect) w/Device KIT Check blood sugars four times daily. Please substitute with appropriate alternative as covered by patient's insurance. Dx: E11.65 1/16/24   ALINA Melendez   Cholecalciferol 50 MCG (2000 UT) TABS Take 1 tablet (2,000 Units total) by mouth daily 4/9/24   Fabián Mcclain MD   clopidogrel (PLAVIX) 75 mg tablet Take 1 tablet (75 mg total) by mouth daily 1/16/24 1/10/25  ALINA Melendez    glucose blood (Contour Next Test) test strip Check sugar twice daily 1/18/24   ALINA Gresham   glucose blood (OneTouch Verio) test strip Check blood sugars four times daily. Please substitute with appropriate alternative as covered by patient's insurance. Dx: E11.65 1/16/24   ALINA Melendez   insulin aspart (NovoLOG FlexPen) 100 UNIT/ML injection pen Sliding Scale: 0-149=0 units, 150-199=2 units, 200-249=4 units, 250-299=6 units, 300-349=8 units, 350-399=10 units, >400=Call office 1/15/24   ALINA Gresham   insulin degludec (Tresiba FlexTouch) 100 units/mL injection pen Inject 100 Units under the skin daily 1/16/24   ALINA Melendez   Insulin Pen Needle (Advocate Insulin Pen Needles) 31G X 8 MM MISC Use 4 (four) times a day 1/16/24   ALINA Melendez   lisinopril (ZESTRIL) 40 mg tablet Take 1 tablet (40 mg total) by mouth daily 2/19/24   ALINA Gresham   nitroglycerin (NITROSTAT) 0.4 mg SL tablet Place 1 tablet (0.4 mg total) under the tongue every 5 (five) minutes as needed for chest pain for up to 10 days  Patient not taking: Reported on 4/9/2024 8/23/21 2/19/24  Levon Potts MD   OneTouch Delica Lancets 33G MISC Check blood sugars four times daily. Please substitute with appropriate alternative as covered by patient's insurance. Dx: E11.65 1/16/24   ALINA Melendez   tamsulosin (FLOMAX) 0.4 mg Take 0.4 mg by mouth 1/19/24 1/18/25  Historical Provider, MD     I have reviewed home medications with patient personally.    Allergies:   Allergies   Allergen Reactions    Cat Hair Extract Shortness Of Breath       Social History:  Marital Status:      Substance Use History:   Social History     Substance and Sexual Activity   Alcohol Use Yes     Social History     Tobacco Use   Smoking Status Never    Passive exposure: Never   Smokeless Tobacco Never     Social History     Substance and Sexual Activity   Drug Use Never       Family History:  Family History   Problem  Relation Age of Onset    Lung cancer Father        Physical Exam:     Vitals:   Blood Pressure: 153/69 (04/17/24 1920)  Pulse: 79 (04/17/24 1920)  Temperature: (!) 101.3 °F (38.5 °C) (04/17/24 1920)  Temp Source: Oral (04/17/24 1920)  Respirations: (!) 26 (04/17/24 1920)  SpO2: 98 % (04/17/24 2027)    Physical Exam  Constitutional:       General: He is not in acute distress.     Appearance: He is well-developed. He is obese. He is not diaphoretic.   HENT:      Head: Normocephalic and atraumatic.      Mouth/Throat:      Pharynx: No oropharyngeal exudate.   Eyes:      General: No scleral icterus.     Extraocular Movements: Extraocular movements intact.      Conjunctiva/sclera: Conjunctivae normal.   Neck:      Vascular: No JVD.      Trachea: No tracheal deviation.   Cardiovascular:      Rate and Rhythm: Normal rate and regular rhythm.      Heart sounds: No murmur heard.     No friction rub. No gallop.   Pulmonary:      Effort: No respiratory distress.      Breath sounds: No stridor. Rhonchi present. No wheezing.   Abdominal:      General: There is no distension.      Palpations: Abdomen is soft. There is no mass.      Tenderness: There is no abdominal tenderness. There is no right CVA tenderness or left CVA tenderness.   Musculoskeletal:         General: No tenderness. Normal range of motion.      Right lower leg: No edema.      Left lower leg: No edema.   Skin:     General: Skin is warm and dry.      Coloration: Skin is not pale.      Findings: No erythema.   Neurological:      Mental Status: He is oriented to person, place, and time.   Psychiatric:         Behavior: Behavior normal.         Thought Content: Thought content normal.          Additional Data:     Lab Results:  Results from last 7 days   Lab Units 04/17/24  1510   WBC Thousand/uL 8.96   HEMOGLOBIN g/dL 14.9   HEMATOCRIT % 42.1   PLATELETS Thousands/uL 249   SEGS PCT % 77*   LYMPHO PCT % 12*   MONO PCT % 10   EOS PCT % 1     Results from last 7 days    Lab Units 04/17/24  1510   SODIUM mmol/L 132*   POTASSIUM mmol/L 4.3   CHLORIDE mmol/L 107   CO2 mmol/L 15*   BUN mg/dL 31*   CREATININE mg/dL 1.84*   ANION GAP mmol/L 10   CALCIUM mg/dL 8.7   ALBUMIN g/dL 3.9   TOTAL BILIRUBIN mg/dL 0.72   ALK PHOS U/L 61   ALT U/L 13   AST U/L 11*   GLUCOSE RANDOM mg/dL 281*         Results from last 7 days   Lab Units 04/17/24  1848   POC GLUCOSE mg/dl 292*         Results from last 7 days   Lab Units 04/17/24  1510   PROCALCITONIN ng/ml 0.20       Lines/Drains:  Invasive Devices       Peripheral Intravenous Line  Duration             Peripheral IV 04/17/24 Proximal;Right;Ventral (anterior) Forearm <1 day                        Imaging: No pertinent imaging reviewed.  XR chest 2 views    (Results Pending)       EKG and Other Studies Reviewed on Admission:   EKG: NSR Hr 98. ST depression in lateral leads    ** Please Note: This note has been constructed using a voice recognition system. **

## 2024-04-17 NOTE — ASSESSMENT & PLAN NOTE
Presenting with 4-day history of shortness of breath associated with subjective fever, chills, productive cough, nasal congestion.  Denies sick contacts.  Patient denying chest pain or palpitations.  X-ray with possible right lower lobe consolidation, official read pending.    Flu COVID RSV negative  Hemodynamically stable on room air.  Will initiate on IV ceftriaxone for now given symptoms  Of note bicarb on BMP was 15, lactic acid and vbg pending  Continue with supportive care  Follow-up on Pro-Chuck, if negative x 2 consider discontinuation  Monitor fever curve and WBC count  Monitor respiratory status closely

## 2024-04-18 ENCOUNTER — APPOINTMENT (OUTPATIENT)
Dept: NON INVASIVE DIAGNOSTICS | Facility: HOSPITAL | Age: 64
End: 2024-04-18
Payer: COMMERCIAL

## 2024-04-18 PROBLEM — A41.9 SEPSIS (HCC): Status: ACTIVE | Noted: 2024-04-17

## 2024-04-18 LAB
ANION GAP SERPL CALCULATED.3IONS-SCNC: 9 MMOL/L (ref 4–13)
BUN SERPL-MCNC: 29 MG/DL (ref 5–25)
CALCIUM SERPL-MCNC: 8.1 MG/DL (ref 8.4–10.2)
CHLORIDE SERPL-SCNC: 106 MMOL/L (ref 96–108)
CO2 SERPL-SCNC: 16 MMOL/L (ref 21–32)
CREAT SERPL-MCNC: 1.79 MG/DL (ref 0.6–1.3)
CRP SERPL QL: 70.8 MG/L
ERYTHROCYTE [DISTWIDTH] IN BLOOD BY AUTOMATED COUNT: 12.5 % (ref 11.6–15.1)
ERYTHROCYTE [SEDIMENTATION RATE] IN BLOOD: 34 MM/HOUR (ref 0–19)
GFR SERPL CREATININE-BSD FRML MDRD: 39 ML/MIN/1.73SQ M
GLUCOSE SERPL-MCNC: 130 MG/DL (ref 65–140)
GLUCOSE SERPL-MCNC: 247 MG/DL (ref 65–140)
GLUCOSE SERPL-MCNC: 315 MG/DL (ref 65–140)
GLUCOSE SERPL-MCNC: 321 MG/DL (ref 65–140)
GLUCOSE SERPL-MCNC: 324 MG/DL (ref 65–140)
GLUCOSE SERPL-MCNC: 332 MG/DL (ref 65–140)
HCT VFR BLD AUTO: 40 % (ref 36.5–49.3)
HGB BLD-MCNC: 14 G/DL (ref 12–17)
L PNEUMO1 AG UR QL IA.RAPID: NEGATIVE
MCH RBC QN AUTO: 28.7 PG (ref 26.8–34.3)
MCHC RBC AUTO-ENTMCNC: 35 G/DL (ref 31.4–37.4)
MCV RBC AUTO: 82 FL (ref 82–98)
PLATELET # BLD AUTO: 215 THOUSANDS/UL (ref 149–390)
PMV BLD AUTO: 9.6 FL (ref 8.9–12.7)
POTASSIUM SERPL-SCNC: 4.4 MMOL/L (ref 3.5–5.3)
PROCALCITONIN SERPL-MCNC: 0.21 NG/ML
RBC # BLD AUTO: 4.88 MILLION/UL (ref 3.88–5.62)
S PNEUM AG UR QL: NEGATIVE
SODIUM SERPL-SCNC: 131 MMOL/L (ref 135–147)
WBC # BLD AUTO: 7.3 THOUSAND/UL (ref 4.31–10.16)

## 2024-04-18 PROCEDURE — 84145 PROCALCITONIN (PCT): CPT | Performed by: STUDENT IN AN ORGANIZED HEALTH CARE EDUCATION/TRAINING PROGRAM

## 2024-04-18 PROCEDURE — 82948 REAGENT STRIP/BLOOD GLUCOSE: CPT

## 2024-04-18 PROCEDURE — 86140 C-REACTIVE PROTEIN: CPT | Performed by: STUDENT IN AN ORGANIZED HEALTH CARE EDUCATION/TRAINING PROGRAM

## 2024-04-18 PROCEDURE — 87040 BLOOD CULTURE FOR BACTERIA: CPT | Performed by: STUDENT IN AN ORGANIZED HEALTH CARE EDUCATION/TRAINING PROGRAM

## 2024-04-18 PROCEDURE — 80048 BASIC METABOLIC PNL TOTAL CA: CPT | Performed by: STUDENT IN AN ORGANIZED HEALTH CARE EDUCATION/TRAINING PROGRAM

## 2024-04-18 PROCEDURE — 85027 COMPLETE CBC AUTOMATED: CPT | Performed by: STUDENT IN AN ORGANIZED HEALTH CARE EDUCATION/TRAINING PROGRAM

## 2024-04-18 PROCEDURE — 99232 SBSQ HOSP IP/OBS MODERATE 35: CPT | Performed by: NURSE PRACTITIONER

## 2024-04-18 PROCEDURE — 85652 RBC SED RATE AUTOMATED: CPT | Performed by: STUDENT IN AN ORGANIZED HEALTH CARE EDUCATION/TRAINING PROGRAM

## 2024-04-18 RX ORDER — OXYMETAZOLINE HYDROCHLORIDE 0.05 G/100ML
2 SPRAY NASAL 3 TIMES DAILY PRN
COMMUNITY

## 2024-04-18 RX ADMIN — HEPARIN SODIUM 5000 UNITS: 5000 INJECTION INTRAVENOUS; SUBCUTANEOUS at 21:18

## 2024-04-18 RX ADMIN — INSULIN GLARGINE 50 UNITS: 100 INJECTION, SOLUTION SUBCUTANEOUS at 08:40

## 2024-04-18 RX ADMIN — CEFTRIAXONE SODIUM 1000 MG: 10 INJECTION, POWDER, FOR SOLUTION INTRAVENOUS at 17:04

## 2024-04-18 RX ADMIN — ATORVASTATIN CALCIUM 10 MG: 10 TABLET, FILM COATED ORAL at 08:40

## 2024-04-18 RX ADMIN — INSULIN LISPRO 2 UNITS: 100 INJECTION, SOLUTION INTRAVENOUS; SUBCUTANEOUS at 17:00

## 2024-04-18 RX ADMIN — AZITHROMYCIN 500 MG: 500 TABLET, FILM COATED ORAL at 21:18

## 2024-04-18 RX ADMIN — LIDOCAINE 5% 2 PATCH: 700 PATCH TOPICAL at 08:40

## 2024-04-18 RX ADMIN — CLOPIDOGREL 75 MG: 75 TABLET ORAL at 08:40

## 2024-04-18 RX ADMIN — BENZONATATE 100 MG: 100 CAPSULE ORAL at 03:53

## 2024-04-18 RX ADMIN — HEPARIN SODIUM 5000 UNITS: 5000 INJECTION INTRAVENOUS; SUBCUTANEOUS at 06:40

## 2024-04-18 RX ADMIN — GUAIFENESIN 600 MG: 600 TABLET ORAL at 08:40

## 2024-04-18 RX ADMIN — GUAIFENESIN 600 MG: 600 TABLET ORAL at 16:54

## 2024-04-18 RX ADMIN — INSULIN LISPRO 3 UNITS: 100 INJECTION, SOLUTION INTRAVENOUS; SUBCUTANEOUS at 21:20

## 2024-04-18 RX ADMIN — INSULIN LISPRO 3 UNITS: 100 INJECTION, SOLUTION INTRAVENOUS; SUBCUTANEOUS at 08:43

## 2024-04-18 RX ADMIN — TAMSULOSIN HYDROCHLORIDE 0.4 MG: 0.4 CAPSULE ORAL at 16:54

## 2024-04-18 RX ADMIN — FLUTICASONE PROPIONATE 1 SPRAY: 50 SPRAY, METERED NASAL at 08:41

## 2024-04-18 RX ADMIN — HEPARIN SODIUM 5000 UNITS: 5000 INJECTION INTRAVENOUS; SUBCUTANEOUS at 16:53

## 2024-04-18 NOTE — ASSESSMENT & PLAN NOTE
Evolving soon after admission, evidenced by fever, tachycardia, and tachypnea  Patient without leukocytosis, procalcitonin level 0.21 with a normal lactic acid level.  Flu/RSV/Covid Negative  Chest x-ray does indicate left lower lobe pneumonia.  Continue IV Ceftriaxone for pneumonia coverage  Blood cultures x 2 obtained, pending.  Strep pneumoniae/legionella pending.

## 2024-04-18 NOTE — ASSESSMENT & PLAN NOTE
History of, stable pressures, 112/72  Lisinopril on hold due to creatinine  Monitor vital signs per unit routine

## 2024-04-18 NOTE — ASSESSMENT & PLAN NOTE
Chronic, uncontrolled, as evidenced by a hemoglobin A1C of 8.1  Reportedly taking 120 units Tresiba daily with 14 units Lispro with breakfast.  Continue insulin sliding scale and will give 50 units Lantus daily.  Monitor blood glucose AC/HS  Hypoglycemia protocol  [facility] Diet

## 2024-04-18 NOTE — ASSESSMENT & PLAN NOTE
Present on admission, sodium level of 132  Slight downtrend this morning to 131  Continue to monitor, continue with IVF

## 2024-04-18 NOTE — ASSESSMENT & PLAN NOTE
Chronic, baseline creatinine noted to be 1.5-1.7  Currently within baseline at 1.79  Avoid nephrotoxins, hypotension  Monitor BMP

## 2024-04-18 NOTE — UTILIZATION REVIEW
NOTIFICATION OF INPATIENT ADMISSION   AUTHORIZATION REQUEST   SERVICING FACILITY:   [place] [address]  Tax ID: [id number]  NPI: [id number] ATTENDING PROVIDER:  Attending Name and NPI#: Fabian Mcclain Md [[id number]]  Address: [address] [place] [address]  Phone: [phone]     ADMISSION INFORMATION:  Place of Service: Inpatient [facility]  Place of Service Code: 21  Inpatient Admission Date/Time: N/A N/A  Discharge Date/Time: No discharge date for patient encounter.  Admitting Diagnosis Code/Description:  Viral syndrome [B34.9]  Chest pain [R07.9]  Cold virus [J00]  Elevated troponin [R79.89]     UTILIZATION REVIEW CONTACT:  Maris Burroughs Utilization   Network Utilization Review Department  Phone: [phone]  Fax [phone]  Email: [email or web address]  Contact for approvals/pending authorizations, clinical reviews, and discharge.     PHYSICIAN ADVISORY SERVICES:  Medical Necessity Denial & Nbde-ry-Usfo Review  Phone: [phone]  Fax: [phone]  Email: [email or web address]     DISCHARGE SUPPORT TEAM:  For Patients Discharge Needs & Updates  Phone: [phone] opt. 2 Fax: [phone]  Email: [email or web address]

## 2024-04-18 NOTE — PROGRESS NOTES
[facility]  Progress Note  Name: Blair Caldwell I  MRN: [id number]  Unit/Bed#: -01 I Date of Admission: 4/17/2024   Date of Service: 4/18/2024 I Hospital Day: 0    Assessment/Plan   * Sepsis (HCC)  Assessment & Plan  Evolving soon after admission, evidenced by fever, tachycardia, and tachypnea  Patient without leukocytosis, procalcitonin level 0.21 with a normal lactic acid level.  Flu/RSV/Covid Negative  Chest x-ray does indicate left lower lobe pneumonia.  Continue IV Ceftriaxone for pneumonia coverage  Blood cultures x 2 obtained, pending.  Strep pneumoniae/legionella pending.    Elevated troponin  Assessment & Plan  Patient presented to the ED with complaints of fever, chills, productive cough with shortness of breath.  Noted to have elevated troponin levels, initial 117, trending down to 82  EKG reviewed non-specific T wave changes.  No complaints of shortness of breath.  Echo ordered, pending.  CRP elevated, 70.8, Sed rate 34    Hyponatremia  Assessment & Plan  Present on admission, sodium level of 132  Slight downtrend this morning to 131  Continue to monitor, continue with IVF    Stage 3b chronic kidney disease (HCC)  Assessment & Plan  Chronic, baseline creatinine noted to be 1.5-1.7  Currently within baseline at 1.79  Avoid nephrotoxins, hypotension  Monitor BMP    Coronary artery disease  Assessment & Plan  History of CAD status post PCI on Plavix 75 mg daily, Lipitor 10 mg daily  Patient denies chest pain or palpitations at this time    Essential hypertension  Assessment & Plan  History of, stable pressures, 112/72  Lisinopril on hold due to creatinine  Monitor vital signs per unit routine    Type 2 diabetes mellitus with kidney complication, with long-term current use of insulin (HCC)  Assessment & Plan  Chronic, uncontrolled, as evidenced by a hemoglobin A1C of 8.1  Reportedly taking 120 units Tresiba daily with 14 units Lispro with breakfast.  Continue insulin sliding  scale and will give 50 units Lantus daily.  Monitor blood glucose AC/HS  Hypoglycemia protocol  [facility] Diet             VTE Pharmacologic Prophylaxis: VTE Score: 4 Moderate Risk (Score 3-4) - Pharmacological DVT Prophylaxis Ordered: heparin.    Mobility:   Basic Mobility Inpatient Raw Score: 19  -HLM Goal: 6: Walk 10 steps or more  JH-HLM Achieved: 3: Sit at edge of bed (early in am)  JH-HLM Goal NOT achieved. Continue with multidisciplinary rounding and encourage appropriate mobility to improve upon JH-HLM goals.    Patient Centered Rounds: I performed bedside rounds with nursing staff today.   Discussions with Specialists or Other Care Team Provider: Case Management    Education and Discussions with Family / Patient: Patient declined call to .     Total Time Spent on Date of Encounter in care of patient: 39 mins. This time was spent on one or more of the following: performing physical exam; counseling and coordination of care; obtaining or reviewing history; documenting in the medical record; reviewing/ordering tests, medications or procedures; communicating with other healthcare professionals and discussing with patient's family/caregivers.    Current Length of Stay: 0 day(s)  Current Patient Status: Observation   Certification Statement: The patient will continue to require additional inpatient hospital stay due to ongoing treatment in setting of pneumonia, on IV abx.  Discharge Plan: Anticipate discharge tomorrow to home.    Code Status: Level 1 - Full Code    Subjective:   Patient seen and examined resting in bed.  No complaints of any chest pain this morning.  Reports his breathing is stable, no further shortness of breath.  Asking when he can go home.    Reviewed with patient that we need to wait for blood cultures to come back for at least 24 hours prior to allowing him to go home given the significant fever he had shortly after admission, and leukocytosis.  He is also aware he needs an  echocardiogram.     Objective:     Vitals:   Temp (24hrs), Av.8 °F (37.7 °C), Min:97.3 °F (36.3 °C), Max:101.3 °F (38.5 °C)    Temp:  [97.3 °F (36.3 °C)-101.3 °F (38.5 °C)] 99.5 °F (37.5 °C)  HR:  [] 63  Resp:  [20-26] 26  BP: (112-159)/(67-99) 112/72  SpO2:  [94 %-98 %] 94 %  There is no height or weight on file to calculate BMI.     Input and Output Summary (last 24 hours):     Intake/Output Summary (Last 24 hours) at 2024 0953  Last data filed at 2024 0752  Gross per 24 hour   Intake 691.25 ml   Output --   Net 691.25 ml       Physical Exam:   Physical Exam  Vitals and nursing note reviewed.   Constitutional:       General: He is not in acute distress.     Appearance: He is obese. He is ill-appearing.   Cardiovascular:      Rate and Rhythm: Normal rate.      Pulses: Normal pulses.      Heart sounds: Normal heart sounds.   Pulmonary:      Effort: Pulmonary effort is normal. No tachypnea, bradypnea or respiratory distress.      Breath sounds: Decreased breath sounds (LLE) present.      Comments: RA  Abdominal:      General: Bowel sounds are normal.      Palpations: Abdomen is soft.   Musculoskeletal:         General: Deformity (left hand) present. Normal range of motion.      Right lower leg: No edema.      Left lower leg: No edema.   Skin:     General: Skin is warm and dry.   Neurological:      Mental Status: He is alert and oriented to person, place, and time.   Psychiatric:         Mood and Affect: Mood normal.          Additional Data:     Labs:  Results from last 7 days   Lab Units 24  0556 24  1510   WBC Thousand/uL 7.30 8.96   HEMOGLOBIN g/dL 14.0 14.9   HEMATOCRIT % 40.0 42.1   PLATELETS Thousands/uL 215 249   SEGS PCT %  --  77*   LYMPHO PCT %  --  12*   MONO PCT %  --  10   EOS PCT %  --  1     Results from last 7 days   Lab Units 24  0556 24  1510   SODIUM mmol/L 131* 132*   POTASSIUM mmol/L 4.4 4.3   CHLORIDE mmol/L 106 107   CO2 mmol/L 16* 15*   BUN mg/dL 29*  31*   CREATININE mg/dL 1.79* 1.84*   ANION GAP mmol/L 9 10   CALCIUM mg/dL 8.1* 8.7   ALBUMIN g/dL  --  3.9   TOTAL BILIRUBIN mg/dL  --  0.72   ALK PHOS U/L  --  61   ALT U/L  --  13   AST U/L  --  11*   GLUCOSE RANDOM mg/dL 315* 281*         Results from last 7 days   Lab Units 04/18/24  0717 04/18/24  0344 04/17/24  2141 04/17/24  1848   POC GLUCOSE mg/dl 324* 321* 339* 292*         Results from last 7 days   Lab Units 04/18/24  0556 04/17/24  1952 04/17/24  1510   LACTIC ACID mmol/L  --  1.1  --    PROCALCITONIN ng/ml 0.21  --  0.20       Lines/Drains:  Invasive Devices       Peripheral Intravenous Line  Duration             Peripheral IV 04/17/24 Proximal;Right;Ventral (anterior) Forearm <1 day                      Telemetry:  Telemetry Orders (From admission, onward)               24 Hour Telemetry Monitoring  Continuous x 24 Hours (Telem)        Question:  Reason for 24 Hour Telemetry  Answer:  PCI/EP study (including pacer and ICD implementation), Cardiac surgery, MI, abnormal cardiac cath, and chest pain- rule out MI                     Telemetry Reviewed: Normal Sinus Rhythm  Indication for Continued Telemetry Use: Arrthymias requiring medical therapy             Imaging: No pertinent imaging reviewed.    Recent Cultures (last 7 days):   Results from last 7 days   Lab Units 04/17/24  2108   LEGIONELLA URINARY ANTIGEN  Negative       Last 24 Hours Medication List:   Current Facility-Administered Medications   Medication Dose Route Frequency Provider Last Rate    acetaminophen  650 mg Oral Q6H PRN Jayne Shiela Saraiya, DO      atorvastatin  10 mg Oral Daily Jayne Shiela Saraiya, DO      azithromycin  500 mg Oral Q24H Jayne Shiela Saraiya, DO      benzonatate  100 mg Oral TID PRN Jayne Shiela Saraiya, DO      cefTRIAXone  1,000 mg Intravenous Q24H Jayne Shiela Saraiya, DO 1,000 mg (04/17/24 2021)    clopidogrel  75 mg Oral Daily Jayne Shiela Saraiya, DO      fluticasone  1 spray Each Nare Daily Jayne Shiela Saraiya,  DO      guaiFENesin  600 mg Oral BID Jayne [facility] Saraiya, DO      heparin (porcine)  5,000 Units Subcutaneous Q8H ARNOLDO Jayne [facility] Saraiya, DO      insulin glargine  50 Units Subcutaneous QAM [facility]      insulin lispro  1-5 Units Subcutaneous TID AC Jayne [facility] Saraiya, DO      insulin lispro  1-5 Units Subcutaneous HS [facility], DO      lidocaine  2 patch Topical Daily [facility], DO      melatonin  6 mg Oral HS PRN Brenda Lechuga PA-C      multi-electrolyte  75 mL/hr Intravenous Continuous Jayne [facility] Saraiya, DO Stopped (04/18/24 0230)    nitroglycerin  0.4 mg Sublingual Q5 Min PRN Jayne [facility] Saraiya, DO      tamsulosin  0.4 mg Oral Daily [facility] Dinner [facility]          Today, Patient Was Seen By: ALINA Parrish    **Please Note: This note may have been constructed using a voice recognition system.**

## 2024-04-18 NOTE — PLAN OF CARE
Problem: INFECTION - ADULT  Goal: Absence or prevention of progression during hospitalization  Description: INTERVENTIONS:  - Assess and monitor for signs and symptoms of infection  - Monitor lab/diagnostic results  - Monitor all insertion sites, i.e. indwelling lines, tubes, and drains  - Monitor endotracheal if appropriate and nasal secretions for changes in amount and color  - [place] appropriate cooling/warming therapies per order  - Administer medications as ordered  - Instruct and encourage patient and family to use good hand hygiene technique  - Identify and instruct in appropriate isolation precautions for identified infection/condition  Outcome: Progressing     Problem: Knowledge Deficit  Goal: Patient/family/caregiver demonstrates understanding of disease process, treatment plan, medications, and discharge instructions  Description: Complete learning assessment and assess knowledge base.  Interventions:  - Provide teaching at level of understanding  - Provide teaching via preferred learning methods  Outcome: Progressing

## 2024-04-18 NOTE — ASSESSMENT & PLAN NOTE
Patient presented to the ED with complaints of fever, chills, productive cough with shortness of breath.  Noted to have elevated troponin levels, initial 117, trending down to 82  EKG reviewed non-specific T wave changes.  No complaints of shortness of breath.  Echo ordered, pending.  CRP elevated, 70.8, Sed rate 34

## 2024-04-18 NOTE — UTILIZATION REVIEW
Initial Clinical Review    Admission: Date/Time/Statement:   Admission Orders (From admission, onward)       Ordered        04/17/24 6099  Place in Observation  Once                          Orders Placed This Encounter   Procedures    Place in Observation     Standing Status:   Standing     Number of Occurrences:   1     Order Specific Question:   Level of Care     Answer:   Med Surg [16]     ED Arrival Information       Expected   -    Arrival   4/17/2024 14:21    Acuity   Urgent              Means of arrival   Walk-In    Escorted by   Family Member    Service   Hospitalist    Admission type   Emergency              Arrival complaint   COLD SYMPTOMS             Chief Complaint   Patient presents with    Flu Symptoms     Pt arrived ambulatory c/o cough, chills, body aches, congestion and lightheadedness x 3-4 days ago -n/v -cp       Initial Presentation: 63 y.o. male to ED from home w/  PMH of CAD status post PCI, CKD 3, type 2 diabetes on insulin who presents with 4-day history of fevers, chills, productive cough, nasal congestion and shortness of breath. Productive yellow sputum. Ported fever of 101. Generalized myalgias. Found to have elevated trop. Admitted oBS status for elevated trop , plan for echo, check ESR / CRP , tele , ntg prn for CP . SOB start iv ceftriaxone , supportive care , f/u pct , monitor wbc . Hyponatremia monitor BMP , cont gentle IVF . CKD CR 1.84 cont gentle IVF and monitor , hold lisinopril .  DM SSI and monitor .     Date: 4/18    Day 2: Cont IV ceftriaxone , BC pending . Strep pneumoniae/legionella pending . CRP elevated 70.8 , sed rate 34. Na slight downtrend this am to 131, monitor and cont IVF . Cont w/ fever 99.9 . Wbc dec to 7.30 . Dec breath sounds LLE .     ED Triage Vitals   Temperature Pulse Respirations Blood Pressure SpO2   04/17/24 1426 04/17/24 1426 04/17/24 1426 04/17/24 1426 04/17/24 1426   (!) 97.3 °F (36.3 °C) (!) 107 22 159/99 95 %      Temp Source Heart Rate Source  Patient Position - Orthostatic VS BP Location FiO2 (%)   04/17/24 1426 04/17/24 1426 04/17/24 1426 04/17/24 1426 --   Temporal Monitor Sitting Left arm       Pain Score       04/17/24 1828       No Pain          Wt Readings from Last 1 Encounters:   04/09/24 102 kg (225 lb 6.4 oz)     Additional Vital Signs:   04/18/24 03:03:35 99.9 °F (37.7 °C) -- -- 129/67 88 -- -- --   04/17/24 23:11:52 99.3 °F (37.4 °C) -- -- 131/68 89 -- -- --   04/17/24 2027 -- -- -- -- -- 98 % None (Room air) --   04/17/24 19:20:36 101.3 °F (38.5 °C) Abnormal  79 26 Abnormal  153/69 97 97 % -- Lying   04/17/24 19:19:44 101.3 °F (38.5 °C) Abnormal  -- -- 153/69 97 -- -- --   04/17/24 1828 -- -- -- -- -- -- None (Room air) --   04/17/24 17:58:24 -- -- -- 153/99 117 -- -- --   04/17/24 1500 -- 99 20 151/93 115 94 % None (Room air) Lying     Pertinent Labs/Diagnostic Test Results:   4/17 EKG  ECG rate assessment: normal    Rhythm:     Rhythm: sinus rhythm    Ectopy:     Ectopy: none    QRS:     QRS axis:  Normal     QRS intervals:  Normal   Conduction:     Conduction: normal    ST segments:     ST segments:  Normal   T waves:     T waves: non-specific   XR chest 2 views   Final Result by Divine Rueda MD (04/17 2129)      Left lower lobe pneumonia.            Workstation performed: [id number]           Results from last 7 days   Lab Units 04/17/24  1439   SARS-COV-2  Negative     Results from last 7 days   Lab Units 04/18/24  0556 04/17/24  1510   WBC Thousand/uL 7.30 8.96   HEMOGLOBIN g/dL 14.0 14.9   HEMATOCRIT % 40.0 42.1   PLATELETS Thousands/uL 215 249   TOTAL NEUT ABS Thousands/µL  --  6.82         Results from last 7 days   Lab Units 04/18/24  0556 04/17/24  1510   SODIUM mmol/L 131* 132*   POTASSIUM mmol/L 4.4 4.3   CHLORIDE mmol/L 106 107   CO2 mmol/L 16* 15*   ANION GAP mmol/L 9 10   BUN mg/dL 29* 31*   CREATININE mg/dL 1.79* 1.84*   EGFR ml/min/1.73sq m 39 37   CALCIUM mg/dL 8.1* 8.7     Results from last 7 days   Lab Units  04/17/24  1510   AST U/L 11*   ALT U/L 13   ALK PHOS U/L 61   TOTAL PROTEIN g/dL 7.1   ALBUMIN g/dL 3.9   TOTAL BILIRUBIN mg/dL 0.72     Results from last 7 days   Lab Units 04/18/24  0344 04/17/24  2141 04/17/24  1848   POC GLUCOSE mg/dl 321* 339* 292*     Results from last 7 days   Lab Units 04/18/24  0556 04/17/24  1510   GLUCOSE RANDOM mg/dL 315* 281*     Results from last 7 days   Lab Units 04/17/24 2021   PH ALEX  7.386   PCO2 ALEX mm Hg 25.8*   PO2 ALEX mm Hg 50.2*   HCO3 ALEX mmol/L 15.1*   BASE EXC ALEX mmol/L -7.8   O2 CONTENT ALEX ml/dL 19.2   O2 HGB, VENOUS % 85.4*       Results from last 7 days   Lab Units 04/17/24  1718 04/17/24  1510   HS TNI 0HR ng/L  --  117*   HS TNI 2HR ng/L 83*  --    HSTNI D2 ng/L -34  --      Results from last 7 days   Lab Units 04/17/24  1550   D-DIMER QUANTITATIVE ug/ml FEU 0.57*       Results from last 7 days   Lab Units 04/18/24  0556 04/17/24  1510   PROCALCITONIN ng/ml 0.21 0.20     Results from last 7 days   Lab Units 04/17/24  1952   LACTIC ACID mmol/L 1.1     Results from last 7 days   Lab Units 04/17/24  1510   BNP pg/mL 23       Results from last 7 days   Lab Units 04/18/24  0556   CRP mg/L 70.8*   SED RATE mm/hour 34*     Results from last 7 days   Lab Units 04/17/24  1439   INFLUENZA A PCR  Negative   INFLUENZA B PCR  Negative   RSV PCR  Negative     ED Treatment:   Medication Administration from 04/17/2024 1420 to 04/17/2024 1749       None          Past Medical History:   Diagnosis Date    Chronic kidney disease     Diabetes mellitus (HCC)     Enlarged prostate     Hypertension     Kidney stone     Kidney stones     Myocardial infarction (HCC)      Present on Admission:   Essential hypertension   Coronary artery disease   Stage 3b chronic kidney disease (HCC)   Elevated troponin      Admitting Diagnosis: Viral syndrome [B34.9]  Chest pain [R07.9]  Cold virus [J00]  Elevated troponin [R79.89]  Age/Sex: 63 y.o. male  Admission Orders:  Scheduled  Medications:  atorvastatin, 10 mg, Oral, Daily  azithromycin, 500 mg, Oral, Q24H  cefTRIAXone, 1,000 mg, Intravenous, Q24H  clopidogrel, 75 mg, Oral, Daily  fluticasone, 1 spray, Each Nare, Daily  guaiFENesin, 600 mg, Oral, BID  heparin (porcine), 5,000 Units, Subcutaneous, Q8H ARNOLDO  insulin glargine, 50 Units, Subcutaneous, QAM  insulin lispro, 1-5 Units, Subcutaneous, TID AC  insulin lispro, 1-5 Units, Subcutaneous, HS  lidocaine, 2 patch, Topical, Daily  tamsulosin, 0.4 mg, Oral, Daily With Dinner      Continuous IV Infusions:  multi-electrolyte, 75 mL/hr, Intravenous, Continuous      PRN Meds:  acetaminophen, 650 mg, Oral, Q6H PRN  benzonatate, 100 mg, Oral, TID PRN  melatonin, 6 mg, Oral, HS PRN  nitroglycerin, 0.4 mg, Sublingual, Q5 Min PRN      Fingerstick ac and hs   Tele   Up and OOB   Aspiration precautions      Network Utilization Review Department  ATTENTION: Please call with any questions or concerns to [phone] and carefully listen to the prompts so that you are directed to the right person. All voicemails are confidential.   For Discharge needs, contact Care Management DC Support Team at [phone] opt. [phone]  Send all requests for admission clinical reviews, approved or denied determinations and any other requests to dedicated fax number below belonging to the [place] where the patient is receiving treatment. List of dedicated fax numbers for the Facilities:  FACILITY NAME UR FAX NUMBER   ADMISSION DENIALS (Administrative/Medical Necessity) [phone]   DISCHARGE SUPPORT TEAM (NETWORK) [phone]   PARENT CHILD HEALTH (Maternity/NICU/Pediatrics) [phone]   [facility] [phone]   [facility] [phone]   [facility] [phone]   [facility] [phone]   [facility] [phone]   [facility] [phone]   [facility]  [place] [phone]   [facility] [phone]   [facility] [phone]   [facility] [phone]   [facility] [phone]   [facility] [phone]

## 2024-04-18 NOTE — PLAN OF CARE
Problem: PAIN - ADULT  Goal: Verbalizes/displays adequate comfort level or baseline comfort level  Description: Interventions:  - Encourage patient to monitor pain and request assistance  - Assess pain using appropriate pain scale  - Administer analgesics based on type and severity of pain and evaluate response  - Implement non-pharmacological measures as appropriate and evaluate response  - Consider cultural and social influences on pain and pain management  - Notify physician/advanced practitioner if interventions unsuccessful or patient reports new pain  4/18/2024 0801 by Leighann Garcia RN  Outcome: Progressing  4/17/2024 1825 by Leighann Garcia RN  Outcome: Progressing     Problem: INFECTION - ADULT  Goal: Absence or prevention of progression during hospitalization  Description: INTERVENTIONS:  - Assess and monitor for signs and symptoms of infection  - Monitor lab/diagnostic results  - Monitor all insertion sites, i.e. indwelling lines, tubes, and drains  - Monitor endotracheal if appropriate and nasal secretions for changes in amount and color  - [place] appropriate cooling/warming therapies per order  - Administer medications as ordered  - Instruct and encourage patient and family to use good hand hygiene technique  - Identify and instruct in appropriate isolation precautions for identified infection/condition  4/18/2024 0801 by Leighann Garcia RN  Outcome: Progressing  4/17/2024 1825 by Leighann Garcia RN  Outcome: Progressing  Goal: Absence of fever/infection during neutropenic period  Description: INTERVENTIONS:  - Monitor WBC    4/18/2024 0801 by Leighann Garcia RN  Outcome: Progressing  4/17/2024 1825 by Leighann Garcia RN  Outcome: Progressing     Problem: SAFETY ADULT  Goal: Maintain or return to baseline ADL function  Description: INTERVENTIONS:  -  Assess patient's ability to carry out ADLs; assess patient's baseline for ADL function and identify physical deficits which impact ability to perform ADLs  (bathing, care of mouth/teeth, toileting, grooming, dressing, etc.)  - Assess/evaluate cause of self-care deficits   - Assess range of motion  - Assess patient's mobility; develop plan if impaired  - Assess patient's need for assistive devices and provide as appropriate  - Encourage maximum independence but intervene and supervise when necessary  - Involve family in performance of ADLs  - Assess for home care needs following discharge   - Consider OT consult to assist with ADL evaluation and planning for discharge  - Provide patient education as appropriate  4/18/2024 0801 by Leighann Garcia RN  Outcome: Progressing  4/17/2024 1825 by Leighann Garcia RN  Outcome: Progressing     Problem: DISCHARGE PLANNING  Goal: Discharge to home or other facility with appropriate resources  Description: INTERVENTIONS:  - Identify barriers to discharge w/patient and caregiver  - Arrange for needed discharge resources and transportation as appropriate  - Identify discharge learning needs (meds, wound care, etc.)  - Arrange for interpretive services to assist at discharge as needed  - Refer to Case Management Department for coordinating discharge planning if the patient needs post-hospital services based on physician/advanced practitioner order or complex needs related to functional status, cognitive ability, or social support system  4/18/2024 0801 by Leighann Garcia RN  Outcome: Progressing  4/17/2024 1825 by Leighann Garcia RN  Outcome: Progressing     Problem: Knowledge Deficit  Goal: Patient/family/caregiver demonstrates understanding of disease process, treatment plan, medications, and discharge instructions  Description: Complete learning assessment and assess knowledge base.  Interventions:  - Provide teaching at level of understanding  - Provide teaching via preferred learning methods  4/18/2024 0801 by Leighann Garcia RN  Outcome: Progressing  4/17/2024 1825 by Leighann Garcia RN  Outcome: Progressing

## 2024-04-19 ENCOUNTER — APPOINTMENT (OUTPATIENT)
Dept: NON INVASIVE DIAGNOSTICS | Facility: HOSPITAL | Age: 64
End: 2024-04-19
Payer: COMMERCIAL

## 2024-04-19 ENCOUNTER — APPOINTMENT (OUTPATIENT)
Dept: NON INVASIVE DIAGNOSTICS | Facility: CLINIC | Age: 64
End: 2024-04-19
Payer: COMMERCIAL

## 2024-04-19 ENCOUNTER — TRANSITIONAL CARE MANAGEMENT (OUTPATIENT)
Dept: FAMILY MEDICINE CLINIC | Facility: CLINIC | Age: 64
End: 2024-04-19

## 2024-04-19 VITALS
BODY MASS INDEX: 33.33 KG/M2 | OXYGEN SATURATION: 97 % | HEIGHT: 69 IN | RESPIRATION RATE: 26 BRPM | HEART RATE: 70 BPM | TEMPERATURE: 98.3 F | DIASTOLIC BLOOD PRESSURE: 85 MMHG | WEIGHT: 225 LBS | SYSTOLIC BLOOD PRESSURE: 144 MMHG

## 2024-04-19 PROBLEM — J18.9 LEFT LOWER LOBE PNEUMONIA: Status: ACTIVE | Noted: 2024-04-19

## 2024-04-19 PROBLEM — I5A NON-ISCHEMIC MYOCARDIAL INJURY (NON-TRAUMATIC): Status: ACTIVE | Noted: 2024-04-17

## 2024-04-19 LAB
ANION GAP SERPL CALCULATED.3IONS-SCNC: 8 MMOL/L (ref 4–13)
AORTIC ROOT: 3 CM
APICAL FOUR CHAMBER EJECTION FRACTION: 55 %
ASCENDING AORTA: 3.1 CM
BSA FOR ECHO PROCEDURE: 2.17 M2
BUN SERPL-MCNC: 31 MG/DL (ref 5–25)
CALCIUM SERPL-MCNC: 8.2 MG/DL (ref 8.4–10.2)
CHLORIDE SERPL-SCNC: 107 MMOL/L (ref 96–108)
CO2 SERPL-SCNC: 17 MMOL/L (ref 21–32)
CREAT SERPL-MCNC: 1.62 MG/DL (ref 0.6–1.3)
E WAVE DECELERATION TIME: 247 MS
E/A RATIO: 0.93
ERYTHROCYTE [DISTWIDTH] IN BLOOD BY AUTOMATED COUNT: 12.5 % (ref 11.6–15.1)
FRACTIONAL SHORTENING: 35 (ref 28–44)
GFR SERPL CREATININE-BSD FRML MDRD: 44 ML/MIN/1.73SQ M
GLUCOSE SERPL-MCNC: 345 MG/DL (ref 65–140)
GLUCOSE SERPL-MCNC: 347 MG/DL (ref 65–140)
GLUCOSE SERPL-MCNC: 348 MG/DL (ref 65–140)
HCT VFR BLD AUTO: 37.6 % (ref 36.5–49.3)
HGB BLD-MCNC: 13.4 G/DL (ref 12–17)
INTERVENTRICULAR SEPTUM IN DIASTOLE (PARASTERNAL SHORT AXIS VIEW): 1.4 CM
INTERVENTRICULAR SEPTUM: 1.4 CM (ref 0.6–1.1)
LA/AORTA RATIO 2D: 1.43
LAAS-AP2: 13.1 CM2
LAAS-AP4: 16.4 CM2
LEFT ATRIUM SIZE: 4.3 CM
LEFT INTERNAL DIMENSION IN SYSTOLE: 3.4 CM (ref 2.1–4)
LEFT VENTRICULAR INTERNAL DIMENSION IN DIASTOLE: 5.2 CM (ref 3.5–6)
LEFT VENTRICULAR POSTERIOR WALL IN END DIASTOLE: 1.3 CM
LEFT VENTRICULAR STROKE VOLUME: 83 ML
LVSV (TEICH): 83 ML
MAGNESIUM SERPL-MCNC: 1.8 MG/DL (ref 1.9–2.7)
MCH RBC QN AUTO: 28.8 PG (ref 26.8–34.3)
MCHC RBC AUTO-ENTMCNC: 35.6 G/DL (ref 31.4–37.4)
MCV RBC AUTO: 81 FL (ref 82–98)
MV E'TISSUE VEL-SEP: 6 CM/S
MV PEAK A VEL: 0.58 M/S
MV PEAK E VEL: 54 CM/S
MV STENOSIS PRESSURE HALF TIME: 72 MS
MV VALVE AREA P 1/2 METHOD: 3.06
PLATELET # BLD AUTO: 205 THOUSANDS/UL (ref 149–390)
PMV BLD AUTO: 9.8 FL (ref 8.9–12.7)
POTASSIUM SERPL-SCNC: 4.3 MMOL/L (ref 3.5–5.3)
PROCALCITONIN SERPL-MCNC: 0.2 NG/ML
RBC # BLD AUTO: 4.66 MILLION/UL (ref 3.88–5.62)
RIGHT VENTRICLE ID DIMENSION: 3.8 CM
SL CV LV EF: 55
SL CV PED ECHO LEFT VENTRICLE DIASTOLIC VOLUME (MOD BIPLANE) 2D: 131 ML
SL CV PED ECHO LEFT VENTRICLE SYSTOLIC VOLUME (MOD BIPLANE) 2D: 48 ML
SODIUM SERPL-SCNC: 132 MMOL/L (ref 135–147)
TRICUSPID ANNULAR PLANE SYSTOLIC EXCURSION: 2.3 CM
WBC # BLD AUTO: 7.01 THOUSAND/UL (ref 4.31–10.16)

## 2024-04-19 PROCEDURE — 99239 HOSP IP/OBS DSCHRG MGMT >30: CPT | Performed by: NURSE PRACTITIONER

## 2024-04-19 PROCEDURE — 85027 COMPLETE CBC AUTOMATED: CPT | Performed by: NURSE PRACTITIONER

## 2024-04-19 PROCEDURE — 93306 TTE W/DOPPLER COMPLETE: CPT

## 2024-04-19 PROCEDURE — 83735 ASSAY OF MAGNESIUM: CPT | Performed by: NURSE PRACTITIONER

## 2024-04-19 PROCEDURE — 82948 REAGENT STRIP/BLOOD GLUCOSE: CPT

## 2024-04-19 PROCEDURE — 84145 PROCALCITONIN (PCT): CPT | Performed by: NURSE PRACTITIONER

## 2024-04-19 PROCEDURE — 80048 BASIC METABOLIC PNL TOTAL CA: CPT | Performed by: NURSE PRACTITIONER

## 2024-04-19 PROCEDURE — 87205 SMEAR GRAM STAIN: CPT | Performed by: STUDENT IN AN ORGANIZED HEALTH CARE EDUCATION/TRAINING PROGRAM

## 2024-04-19 PROCEDURE — 87070 CULTURE OTHR SPECIMN AEROBIC: CPT | Performed by: STUDENT IN AN ORGANIZED HEALTH CARE EDUCATION/TRAINING PROGRAM

## 2024-04-19 PROCEDURE — 93306 TTE W/DOPPLER COMPLETE: CPT | Performed by: INTERNAL MEDICINE

## 2024-04-19 RX ORDER — DOXYCYCLINE 100 MG/1
100 TABLET ORAL 2 TIMES DAILY
Qty: 6 TABLET | Refills: 0 | Status: SHIPPED | OUTPATIENT
Start: 2024-04-19 | End: 2024-04-22

## 2024-04-19 RX ORDER — AMOXICILLIN 500 MG/1
500 CAPSULE ORAL EVERY 8 HOURS SCHEDULED
Qty: 9 CAPSULE | Refills: 0 | Status: SHIPPED | OUTPATIENT
Start: 2024-04-19 | End: 2024-04-22

## 2024-04-19 RX ADMIN — INSULIN LISPRO 3 UNITS: 100 INJECTION, SOLUTION INTRAVENOUS; SUBCUTANEOUS at 08:34

## 2024-04-19 RX ADMIN — HEPARIN SODIUM 5000 UNITS: 5000 INJECTION INTRAVENOUS; SUBCUTANEOUS at 05:54

## 2024-04-19 RX ADMIN — INSULIN GLARGINE 50 UNITS: 100 INJECTION, SOLUTION SUBCUTANEOUS at 08:34

## 2024-04-19 RX ADMIN — ATORVASTATIN CALCIUM 10 MG: 10 TABLET, FILM COATED ORAL at 08:34

## 2024-04-19 RX ADMIN — GUAIFENESIN 600 MG: 600 TABLET ORAL at 08:34

## 2024-04-19 RX ADMIN — FLUTICASONE PROPIONATE 1 SPRAY: 50 SPRAY, METERED NASAL at 08:34

## 2024-04-19 RX ADMIN — CLOPIDOGREL 75 MG: 75 TABLET ORAL at 08:34

## 2024-04-19 NOTE — DISCHARGE SUMMARY
[facility]  Discharge- Blair Caldwell 1960, 63 y.o. male MRN: [id number]  Unit/Bed#: MS [facility] Encounter: [id number]  Primary Care Provider: ALINA Gresham   Date and time admitted to hospital: 4/17/2024  2:27 PM    * Sepsis (HCC)  Assessment & Plan  Evolving soon after admission, evidenced by fever, tachycardia, and tachypnea  Patient without leukocytosis, procalcitonin level 0.21 with a normal lactic acid level.  Flu/RSV/Covid Negative  Chest x-ray does indicate left lower lobe pneumonia.  Transition to PO Amoxicillin/Doxycycline on discharge, treat for a total of 5 days.  Blood cultures negative x 24 hrs.  Strep pneumoniae/legionella negative    Non-ischemic myocardial injury (non-traumatic)  Assessment & Plan  Patient presented to the ED with complaints of fever, chills, productive cough with shortness of breath.  Noted to have elevated troponin levels, initial 117, trending down to 82  EKG reviewed non-specific T wave changes.  No complaints of shortness of breath.  Echo (4/19/24): Left ventricular cavity size is normal. Wall thickness is mildly increased. There is mild concentric hypertrophy. The left ventricular ejection fraction is 55%. Systolic function is normal. Wall motion is normal. Diastolic function is normal.   CRP elevated, 70.8, Sed rate 34  Patient should follow-up with Cardiology OP    Left lower lobe pneumonia  Assessment & Plan  Noted on chest x-ray  See plan as noted above    Hyponatremia  Assessment & Plan  Present on admission, sodium level of 132  Stable    Stage 3b chronic kidney disease (HCC)  Assessment & Plan  Chronic, baseline creatinine noted to be 1.5-1.7  Stable within baseline    Coronary artery disease  Assessment & Plan  History of CAD status post PCI on Plavix 75 mg daily, Lipitor 10 mg daily  Patient denies chest pain or palpitations at this time    Essential hypertension  Assessment & Plan  History of, stable pressures, 144/85  Clear to  restart Lisinopril    Type 2 diabetes mellitus with kidney complication, with long-term current use of insulin ([facility])  Assessment & Plan  Chronic, uncontrolled, as evidenced by a hemoglobin A1C of 8.1  Restart home insulin regimen on discharge        Medical Problems       Resolved Problems  Date Reviewed: 4/18/2024   None       Discharging Physician / Practitioner: ALINA Parrish  PCP: ALINA Gresham  Admission Date:   Admission Orders (From admission, onward)       Ordered        04/17/24 1659  Place in Observation  Once                          Discharge Date: 04/19/24    Consultations During Hospital Stay:  None    Procedures Performed:   None    Significant Findings / Test Results:   XR chest 2 views    Result Date: 4/17/2024  Left lower lobe pneumonia. Workstation performed: [id number]      Incidental Findings:   None    Test Results Pending at Discharge (will require follow up):   None     Outpatient Tests Requested:  Follow up with PCP within 1 wk  Repeat chest x-ray in 6-8 wks    Complications:  None    Reason for Admission: Pneumonia    Hospital Course:   Blair Caldwell is a 63 y.o. male patient with past medical history of CKD, diabetes mellitus, hypertension who originally presented to the hospital on 4/17/2024 due to 4-day history of fever, chills and productive cough with nasal congestion and shortness of breath.  Chest x-ray on admission did show that the patient had left lower lung pneumonia.  He also did meet sepsis criteria shortly after admission evidenced by fever, tachycardia and tachypnea.  Patient did not have any leukocytosis and his procalcitonin level was normal with a normal lactic acid level.  Patient was initially on IV ceftriaxone for pneumonia coverage.  Blood cultures x 2 obtained negative x 24 hours.  Patient was also noted to have elevated troponin level initially at 117 but trended down to 82.  EKG was noted to have some nonspecific T wave changes however  "echocardiogram was stable without any acute findings.  CRP and sed rate noted to be elevated however likely in setting of sepsis, pneumonia.  Troponin levels also likely elevated related to nonischemic myocardial injury related to sepsis, pneumonia.    With vast improvement with IV ceftriaxone, will be transitioned over to p.o. amoxicillin and doxycycline to cover for community-acquired pneumonia.    Labs have remained stable, vital signs are stable.  Patient is stable for discharge    Please see above list of diagnoses and related plan for additional information.     Condition at Discharge: stable    Discharge Day Visit / Exam:   Subjective:  \" I am ready to go home\"    Vitals: Blood Pressure: 144/85 (04/19/24 0805)  Pulse: 70 (04/19/24 0805)  Temperature: 98.3 °F (36.8 °C) (04/19/24 0830)  Temp Source: Oral (04/19/24 0300)  Respirations: (!) 26 (04/18/24 2343)  Height: 5' 9\" (175.3 cm) (04/19/24 0805)  Weight - Scale: 102 kg (225 lb) (04/19/24 0805)  SpO2: 97 % (04/19/24 0753)    Exam:   Physical Exam  Vitals and nursing note reviewed.   Constitutional:       General: He is not in acute distress.     Appearance: He is ill-appearing.   Cardiovascular:      Rate and Rhythm: Normal rate.      Pulses: Normal pulses.      Heart sounds: Normal heart sounds.   Pulmonary:      Effort: Pulmonary effort is normal.      Breath sounds: Normal breath sounds.      Comments: RA  Abdominal:      General: Bowel sounds are normal.      Palpations: Abdomen is soft.   Musculoskeletal:         General: Normal range of motion.      Right lower leg: No edema.      Left lower leg: No edema.   Skin:     General: Skin is warm and dry.   Neurological:      Mental Status: He is alert and oriented to person, place, and time.   Psychiatric:         Mood and Affect: Mood normal.          Discussion with Family: Patient declined call to .     Discharge instructions/Information to patient and family:   See after visit summary for " information provided to patient and family.      Provisions for Follow-Up Care:  See after visit summary for information related to follow-up care and any pertinent home health orders.      Mobility at time of Discharge:   Basic Mobility Inpatient Raw Score: 19  JH-HLM Goal: 6: Walk 10 steps or more  JH-HLM Achieved: 7: Walk 25 feet or more  HLM Goal achieved. Continue to encourage appropriate mobility.     Disposition:   Home    Planned Readmission: None     Discharge Statement:  I spent 35 minutes discharging the patient. This time was spent on the day of discharge. I had direct contact with the patient on the day of discharge. Greater than 50% of the total time was spent examining patient, answering all patient questions, arranging and discussing plan of care with patient as well as directly providing post-discharge instructions.  Additional time then spent on discharge activities.    Discharge Medications:  See after visit summary for reconciled discharge medications provided to patient and/or family.      **Please Note: This note may have been constructed using a voice recognition system**

## 2024-04-19 NOTE — PLAN OF CARE
Problem: INFECTION - ADULT  Goal: Absence or prevention of progression during hospitalization  Description: INTERVENTIONS:  - Assess and monitor for signs and symptoms of infection  - Monitor lab/diagnostic results  - Monitor all insertion sites, i.e. indwelling lines, tubes, and drains  - Monitor endotracheal if appropriate and nasal secretions for changes in amount and color  - [place] appropriate cooling/warming therapies per order  - Administer medications as ordered  - Instruct and encourage patient and family to use good hand hygiene technique  - Identify and instruct in appropriate isolation precautions for identified infection/condition  Outcome: Progressing     Problem: Knowledge Deficit  Goal: Patient/family/caregiver demonstrates understanding of disease process, treatment plan, medications, and discharge instructions  Description: Complete learning assessment and assess knowledge base.  Interventions:  - Provide teaching at level of understanding  - Provide teaching via preferred learning methods  Outcome: Progressing

## 2024-04-19 NOTE — UTILIZATION REVIEW
Please note that is is OBS and no authorization would be necessary. Please cancel the authorization [id number].  Thank you

## 2024-04-19 NOTE — ASSESSMENT & PLAN NOTE
Evolving soon after admission, evidenced by fever, tachycardia, and tachypnea  Patient without leukocytosis, procalcitonin level 0.21 with a normal lactic acid level.  Flu/RSV/Covid Negative  Chest x-ray does indicate left lower lobe pneumonia.  Transition to PO Amoxicillin/Doxycycline on discharge, treat for a total of 5 days.  Blood cultures negative x 24 hrs.  Strep pneumoniae/legionella negative

## 2024-04-19 NOTE — ASSESSMENT & PLAN NOTE
Chronic, uncontrolled, as evidenced by a hemoglobin A1C of 8.1  Restart home insulin regimen on discharge

## 2024-04-19 NOTE — ASSESSMENT & PLAN NOTE
Patient presented to the ED with complaints of fever, chills, productive cough with shortness of breath.  Noted to have elevated troponin levels, initial 117, trending down to 82  EKG reviewed non-specific T wave changes.  No complaints of shortness of breath.  Echo (4/19/24): Left ventricular cavity size is normal. Wall thickness is mildly increased. There is mild concentric hypertrophy. The left ventricular ejection fraction is 55%. Systolic function is normal. Wall motion is normal. Diastolic function is normal.   CRP elevated, 70.8, Sed rate 34  Patient should follow-up with Cardiology OP

## 2024-04-21 LAB
BACTERIA SPT RESP CULT: ABNORMAL
GRAM STN SPEC: ABNORMAL

## 2024-04-23 LAB
BACTERIA BLD CULT: NORMAL
BACTERIA BLD CULT: NORMAL

## 2024-05-18 PROBLEM — A41.9 SEPSIS (HCC): Status: RESOLVED | Noted: 2024-04-17 | Resolved: 2024-05-18

## 2024-05-19 PROBLEM — J18.9 LEFT LOWER LOBE PNEUMONIA: Status: RESOLVED | Noted: 2024-04-19 | Resolved: 2024-05-19

## 2024-05-20 DIAGNOSIS — E11.29 TYPE 2 DIABETES MELLITUS WITH OTHER DIABETIC KIDNEY COMPLICATION, WITH LONG-TERM CURRENT USE OF INSULIN (HCC): ICD-10-CM

## 2024-05-20 DIAGNOSIS — I10 ESSENTIAL HYPERTENSION: ICD-10-CM

## 2024-05-20 DIAGNOSIS — I25.10 CORONARY ARTERY DISEASE: ICD-10-CM

## 2024-05-20 DIAGNOSIS — Z79.4 TYPE 2 DIABETES MELLITUS WITH OTHER DIABETIC KIDNEY COMPLICATION, WITH LONG-TERM CURRENT USE OF INSULIN (HCC): ICD-10-CM

## 2024-05-20 NOTE — TELEPHONE ENCOUNTER
Patient called for the following refills:    Atorvastatin 10 mg;  Clopidogrel 75 mg;  Lisinopril 40 mg;  NovoLOG Flex Pen; and  Tresiba FlexTouch.    Please fill with 90 day supply, unless insurance will only allow 60 day.

## 2024-05-21 RX ORDER — INSULIN ASPART 100 [IU]/ML
INJECTION, SOLUTION INTRAVENOUS; SUBCUTANEOUS
Qty: 8.1 ML | Refills: 1 | Status: SHIPPED | OUTPATIENT
Start: 2024-05-21

## 2024-05-21 RX ORDER — LISINOPRIL 40 MG/1
40 TABLET ORAL DAILY
Qty: 30 TABLET | Refills: 5 | Status: SHIPPED | OUTPATIENT
Start: 2024-05-21

## 2024-05-21 RX ORDER — CLOPIDOGREL BISULFATE 75 MG/1
75 TABLET ORAL DAILY
Qty: 90 TABLET | Refills: 1 | Status: SHIPPED | OUTPATIENT
Start: 2024-05-21 | End: 2025-05-16

## 2024-05-21 RX ORDER — INSULIN DEGLUDEC 100 U/ML
100 INJECTION, SOLUTION SUBCUTANEOUS DAILY
Qty: 60 ML | Refills: 2 | Status: SHIPPED | OUTPATIENT
Start: 2024-05-21

## 2024-05-21 RX ORDER — ATORVASTATIN CALCIUM 10 MG/1
10 TABLET, FILM COATED ORAL DAILY
Qty: 30 TABLET | Refills: 0 | Status: SHIPPED | OUTPATIENT
Start: 2024-05-21

## 2024-05-21 NOTE — TELEPHONE ENCOUNTER
PT called back in stating the [facility] informed him that they did not receive the script order for his medications.    Informed PT of new blood work labs needed. PT stated he recently had labs done as seen in his chart as recent as 04/17-04/19    Called Clinical team in office to inform them of this and they confirmed that [facility] did receive the order.     Informed pt that order was put through and receipt confirmed by [facility].     PT states he will call [facility] again.

## 2024-06-15 LAB — COLOGUARD RESULT REPORTABLE: NORMAL

## 2024-06-18 ENCOUNTER — TELEPHONE (OUTPATIENT)
Dept: FAMILY MEDICINE CLINIC | Facility: CLINIC | Age: 64
End: 2024-06-18

## 2024-06-18 NOTE — TELEPHONE ENCOUNTER
----- Message from ALINA Melendez sent at 6/18/2024  4:17 PM EDT -----  Needs to repeat Cologuard  ----- Message -----  From: Bev Hernandez  Sent: 1/15/2024   3:14 PM EDT  To: ALINA Gresham

## 2024-06-19 NOTE — TELEPHONE ENCOUNTER
Called patient back to schedule in order for something to be prescribed. He said forget it, by and hung up.

## 2024-06-19 NOTE — TELEPHONE ENCOUNTER
"Called patient to let him know that he needs to repeat his testing for colorguard. He refuses to repeat the testing, he is not doing it. He also stated \" since you called, by the way I strained my back can I get a muscle relaxer\"     I did tell patient he needs to be evaluated in the office. He said I am absolutely not coming in for a strained back. I need a muscle relaxer sent to [facility]. I explained to patient there is only one provider here. I can talk to him and see but you may have to wait until tomorrow. I talked to Bill and he is not sending in a medication because he needs to be evaluated. Blair said \" I am expecting a muscle relaxer today at [facility] pharmacy, not tomorrow , thank you, bye\" and hung up the phone    "

## 2024-06-19 NOTE — TELEPHONE ENCOUNTER
"Call transferred to me from clerical staff member. Pt is demanding a \"non-narcotic medication\" for back pain. I advised pt that he would need to be seen and evaluated by a provider in the office to assess his back pain and ensure that he is treated appropriately. Pt states that he spoke with multiple family members who state that \"they just call their doctor and get something sent in.\" I advised pt that this is not the [facility] way and offered him an appt to be seen tomorrow by a provider. Pt hung up the phone on me.   "

## 2024-06-30 NOTE — ED NOTES
"As per EMS \"7 epi, 1 bicarb, 350 cc of normal saline, given en route to ED\"         Radha Anguiano, [place]  06/30/24 8993    "

## 2024-06-30 NOTE — ED NOTES
Pulse check via ultrasound guidance by provider, no pulse, CPR continued, 1 epi given by provider via IO     Radha Anguiano RN  06/30/24 3036

## 2024-06-30 NOTE — ED NOTES
Pulse check by provider via ultrasound guidance, no pulse      Radha Anguiano, [place]  06/30/24 7702

## 2024-06-30 NOTE — ED NOTES
Spoke to Elliott Newell at [facility], referral initiated by this RN     Radha Anguiano RN  06/30/24 [id number]

## 2024-07-01 NOTE — ED NOTES
Pt transported to the [facility] with security at this time.     Jessica Martinez RN  06/30/24 9175

## 2024-07-01 NOTE — ED NOTES
Post mortem care completed on this patient at this time. 2 bags of belongings sent with patient. 1 grey tshirt, 1 pair of black gym shorts.      Jessica Martinez RN  06/30/24 2116

## 2024-07-30 NOTE — ED PROVIDER NOTES
History  Chief Complaint   Patient presents with    Cardiac Arrest     Patient arrived via EMS cpr in process     64M presents via EMS, CPR in progress.   Follow ACLS protocols.  Unable to resuscitate.  Patient  in the emergency department.      Cardiac Arrest      Prior to Admission Medications   Prescriptions Last Dose Informant Patient Reported? Taking?   Blood Glucose Monitoring Suppl (Contour Next One) KIT  Self No No   Sig: by Device route 2 (two) times a day May substitute brand based on insurance coverage. Check glucose ACHS.   Blood Glucose Monitoring Suppl (OneTouch Verio Reflect) w/Device KIT  Self No No   Sig: Check blood sugars four times daily. Please substitute with appropriate alternative as covered by patient's insurance. Dx: E11.65   Insulin Pen Needle (Advocate Insulin Pen Needles) 31G X 8 MM MISC  Self No No   Sig: Use 4 (four) times a day   OneTouch Delica Lancets 33G MISC  Self No No   Sig: Check blood sugars four times daily. Please substitute with appropriate alternative as covered by patient's insurance. Dx: E11.65   nitroglycerin (NITROSTAT) 0.4 mg SL tablet   No No   Sig: Place 1 tablet (0.4 mg total) under the tongue every 5 (five) minutes as needed for chest pain for up to 10 days   Patient not taking: Reported on 2024   oxymetazoline (AFRIN) 0.05 % nasal spray   Yes No   Si sprays by Each Nare route 3 (three) times a day as needed for congestion   tamsulosin (FLOMAX) 0.4 mg  Self Yes No   Sig: Take 0.4 mg by mouth      Facility-Administered Medications: None       Past Medical History:   Diagnosis Date    Chronic kidney disease     Diabetes mellitus (HCC)     Enlarged prostate     Hypertension     Kidney stone     Kidney stones     Myocardial infarction (HCC)        Past Surgical History:   Procedure Laterality Date    HAND SURGERY      TONSILLECTOMY         Family History   Problem Relation Age of Onset    Lung cancer Father      I have reviewed and agree with the  history as documented.    E-Cigarette/Vaping    E-Cigarette Use Never User      E-Cigarette/Vaping Substances     Social History     Tobacco Use    Smoking status: Never     Passive exposure: Never    Smokeless tobacco: Never   Vaping Use    Vaping status: Never Used   Substance Use Topics    Alcohol use: Yes    Drug use: Never       Review of Systems   Unable to perform ROS: Intubated       Physical Exam  Physical Exam  Constitutional:       Appearance: He is ill-appearing and diaphoretic.   Eyes:      Comments: Fixed dilated   Cardiovascular:      Comments: No cardiac activity on ultrasound  Pulmonary:      Comments: B/l breath sounds  Musculoskeletal:         General: No deformity.   Skin:     Coloration: Skin is pale.   Neurological:      Comments: No neuro response         Vital Signs  ED Triage Vitals [06/30/24 1830]   Temp Pulse Resp BP SpO2   -- (!) 0 -- -- --      Temp src Heart Rate Source Patient Position - Orthostatic VS BP Location FiO2 (%)   -- -- -- -- --      Pain Score       --           Vitals:    06/30/24 1830   Pulse: (!) 0         Visual Acuity      ED Medications  Medications   EPINEPHrine (FOR EMS ONLY) (ADRENALIN) injection 7 mg (0 mg Does not apply Given to EMS 6/30/24 1838)       Diagnostic Studies  Results Reviewed       Procedure Component Value Units Date/Time    POCT Blood Gas (CG8+) [[id number]]  (Abnormal) Collected: 06/30/24 1822    Lab Status: Final result Specimen: Venous Updated: 06/30/24 1832     ph, Manuel ISTAT 7.084     pCO2, Manuel i-STAT 36.7 mm HG      pO2, Manuel i-STAT 36.0 mm HG      BE, i-STAT -18 mmol/L      HCO3, Manuel i-STAT 11.0 mmol/L      CO2, i-STAT 12 mmol/L      O2 Sat, i-STAT 49 %      SODIUM, I-STAT 139 mmol/l      Potassium, i-STAT 5.5 mmol/L      Calcium, Ionized i-STAT 0.94 mmol/L      Hct, i-STAT 38 %      Hgb, i-STAT 12.9 g/dl      Glucose, i-STAT 311 mg/dl      Specimen Type VENOUS    Fingerstick Glucose (POCT) [[id number]]  (Abnormal) Collected: 06/30/24 1815     Lab Status: Final result Specimen: Blood Updated: 24     POC Glucose 225 mg/dl                    No orders to display              Procedures  CriticalCare Time    Date/Time: 2024 8:22 PM    Performed by: Dee Dee Parkinson DO  Authorized by: Dee Dee Parkinson DO    Critical care provider statement:     Critical care time (minutes):  120    Critical care time was exclusive of:  Separately billable procedures and treating other patients and teaching time    Critical care was necessary to treat or prevent imminent or life-threatening deterioration of the following conditions:  Cardiac failure and respiratory failure    Critical care was time spent personally by me on the following activities:  Obtaining history from patient or surrogate, development of treatment plan with patient or surrogate, discussions with consultants, evaluation of patient's response to treatment, examination of patient, interpretation of cardiac output measurements, ordering and performing treatments and interventions, ordering and review of laboratory studies, ordering and review of radiographic studies and re-evaluation of patient's condition           ED Course  ED Course as of 24 1925   Sun 2024   1830 Time of death - 1828.                                               Medical Decision Making  Cardiopulmonary arrest.  Unable to resuscitate.  Patient  in the emergency department with family surrounding.                 Disposition  Final diagnoses:   Cardiopulmonary arrest (HCC)     Time reflects when diagnosis was documented in both MDM as applicable and the Disposition within this note       Time User Action Codes Description Comment    2024  6:36 PM Dee Dee Parkinson Add [I46.9] Cardiopulmonary arrest (HCC)           ED Disposition       ED Disposition       Condition   --    Date/Time   Sun 2024  6:36 PM    Comment   --             Follow-up Information    None        Date, Time and Cause of Death    Date of Death: 6/30/24  Time of Death:  6:28 PM  Preliminary Cause of Death: Cardiac arrest       Discharge Medication List as of 6/30/2024  8:52 PM        CONTINUE these medications which have NOT CHANGED    Details   nitroglycerin (NITROSTAT) 0.4 mg SL tablet Place 1 tablet (0.4 mg total) under the tongue every 5 (five) minutes as needed for chest pain for up to 10 days, Starting Mon 8/23/2021, Until Mon 2/19/2024 at 2359, Normal      oxymetazoline (AFRIN) 0.05 % nasal spray 2 sprays by Each Nare route 3 (three) times a day as needed for congestion, Historical Med      tamsulosin (FLOMAX) 0.4 mg Take 0.4 mg by mouth, Starting Fri 1/19/2024, Until Sat 1/18/2025 at 2359, Historical Med      atorvastatin (LIPITOR) 10 mg tablet Take 1 tablet (10 mg total) by mouth daily, Starting Tue 5/21/2024, Normal      Blood Glucose Monitoring Suppl (Contour Next One) KIT by Device route 2 (two) times a day May substitute brand based on insurance coverage. Check glucose ACHS., Starting Mon 2/19/2024, Normal      Blood Glucose Monitoring Suppl (OneTouch Verio Reflect) w/Device KIT Check blood sugars four times daily. Please substitute with appropriate alternative as covered by patient's insurance. Dx: E11.65, Normal      Cholecalciferol 50 MCG (2000 UT) TABS Take 1 tablet (2,000 Units total) by mouth daily, Starting Tue 4/9/2024, No Print      clopidogrel (PLAVIX) 75 mg tablet Take 1 tablet (75 mg total) by mouth daily, Starting Tue 5/21/2024, Until Fri 5/16/2025, Normal      !! glucose blood (Contour Next Test) test strip Check sugar twice daily, Normal      !! glucose blood (OneTouch Verio) test strip Check blood sugars four times daily. Please substitute with appropriate alternative as covered by patient's insurance. Dx: E11.65, Normal      insulin aspart (NovoLOG FlexPen) 100 UNIT/ML injection pen Sliding Scale: 0-149=0 units, 150-199=2 units, 200-249=4 units, 250-299=6 units, 300-349=8  units, 350-399=10 units, >400=Call office, Normal      insulin degludec (Tresiba FlexTouch) 100 units/mL injection pen Inject 100 Units under the skin daily, Starting Tue 5/21/2024, Normal      Insulin Pen Needle (Advocate Insulin Pen Needles) 31G X 8 MM MISC Use 4 (four) times a day, Starting Tue 1/16/2024, Normal      lisinopril (ZESTRIL) 40 mg tablet Take 1 tablet (40 mg total) by mouth daily, Starting Tue 5/21/2024, Normal      OneTouch Delica Lancets 33G MISC Check blood sugars four times daily. Please substitute with appropriate alternative as covered by patient's insurance. Dx: E11.65, Normal       !! - Potential duplicate medications found. Please discuss with provider.          No discharge procedures on file.    PDMP Review         Value Time User    PDMP Reviewed  Yes 1/5/2024  6:49 AM Sallie Ibarra PA-C            ED Provider  Electronically Signed by             Dee Dee Parkinson DO  07/30/24 1926